# Patient Record
Sex: FEMALE | Race: WHITE | NOT HISPANIC OR LATINO | Employment: PART TIME | URBAN - METROPOLITAN AREA
[De-identification: names, ages, dates, MRNs, and addresses within clinical notes are randomized per-mention and may not be internally consistent; named-entity substitution may affect disease eponyms.]

---

## 2017-08-02 ENCOUNTER — TRANSFERRED RECORDS (OUTPATIENT)
Dept: HEALTH INFORMATION MANAGEMENT | Facility: CLINIC | Age: 52
End: 2017-08-02

## 2017-09-05 ENCOUNTER — TRANSFERRED RECORDS (OUTPATIENT)
Dept: HEALTH INFORMATION MANAGEMENT | Facility: CLINIC | Age: 52
End: 2017-09-05

## 2017-09-26 ENCOUNTER — TRANSFERRED RECORDS (OUTPATIENT)
Dept: HEALTH INFORMATION MANAGEMENT | Facility: CLINIC | Age: 52
End: 2017-09-26

## 2018-11-13 ENCOUNTER — TRANSFERRED RECORDS (OUTPATIENT)
Dept: HEALTH INFORMATION MANAGEMENT | Facility: CLINIC | Age: 53
End: 2018-11-13

## 2018-12-31 LAB — MAMMOGRAM: NORMAL

## 2019-03-17 ENCOUNTER — TRANSFERRED RECORDS (OUTPATIENT)
Dept: HEALTH INFORMATION MANAGEMENT | Facility: CLINIC | Age: 54
End: 2019-03-17

## 2019-04-15 ENCOUNTER — TRANSFERRED RECORDS (OUTPATIENT)
Dept: HEALTH INFORMATION MANAGEMENT | Facility: CLINIC | Age: 54
End: 2019-04-15

## 2019-04-16 ENCOUNTER — TRANSFERRED RECORDS (OUTPATIENT)
Dept: HEALTH INFORMATION MANAGEMENT | Facility: CLINIC | Age: 54
End: 2019-04-16

## 2019-05-29 ENCOUNTER — TRANSFERRED RECORDS (OUTPATIENT)
Dept: HEALTH INFORMATION MANAGEMENT | Facility: CLINIC | Age: 54
End: 2019-05-29

## 2019-07-10 ENCOUNTER — MEDICAL CORRESPONDENCE (OUTPATIENT)
Dept: HEALTH INFORMATION MANAGEMENT | Facility: CLINIC | Age: 54
End: 2019-07-10

## 2019-07-11 ENCOUNTER — DOCUMENTATION ONLY (OUTPATIENT)
Dept: CARE COORDINATION | Facility: CLINIC | Age: 54
End: 2019-07-11

## 2019-07-11 NOTE — TELEPHONE ENCOUNTER
FUTURE VISIT INFORMATION      FUTURE VISIT INFORMATION:    Date: 7/18/19    Time: 11AM    Location: [facility] [place]  REFERRAL INFORMATION:    Referring provider:      Referring providers clinic:  [facility]     Reason for visit/diagnosis  NEERU    RECORDS REQUESTED FROM:       Clinic name Comments Records Status Imaging Status   [facility]   03/07/2019 notes with JULISSA Hopkins MD      04/16/2019 BRONCHOSCOPY WITH BRONCHIAL ALVEOLAR LAVAGE with JULISSA Hopkins MD   Care Everywhere    [facility]  ENT 03/06/2018 notes with Spencer Clifford MD   Care Everywhere    [facility]   09/05/2017 & 09/28/2017 SLEEP STUDY  Care everywhere                        7/11/19 10:25AM sent a fax to [facility] for sleep study - [facility]   7/15/19 11:03AM received sleep study graphs from [facility], dropping off in clinic drawer - amay

## 2019-07-18 ENCOUNTER — PRE VISIT (OUTPATIENT)
Dept: OTOLARYNGOLOGY | Facility: CLINIC | Age: 54
End: 2019-07-18

## 2019-07-18 ENCOUNTER — OFFICE VISIT (OUTPATIENT)
Dept: OTOLARYNGOLOGY | Facility: CLINIC | Age: 54
End: 2019-07-18
Payer: COMMERCIAL

## 2019-07-18 VITALS — WEIGHT: 203 LBS | BODY MASS INDEX: 32.62 KG/M2 | HEIGHT: 66 IN

## 2019-07-18 DIAGNOSIS — G47.33 OSA (OBSTRUCTIVE SLEEP APNEA): Primary | ICD-10-CM

## 2019-07-18 RX ORDER — MONTELUKAST SODIUM 10 MG/1
10 TABLET ORAL
COMMUNITY
Start: 2019-05-29 | End: 2019-11-11

## 2019-07-18 RX ORDER — FLUTICASONE PROPIONATE 50 MCG
1 SPRAY, SUSPENSION (ML) NASAL
COMMUNITY
Start: 2018-09-06 | End: 2024-07-11

## 2019-07-18 RX ORDER — ALBUTEROL SULFATE 90 UG/1
2 AEROSOL, METERED RESPIRATORY (INHALATION)
COMMUNITY
Start: 2019-05-29

## 2019-07-18 RX ORDER — ESTRADIOL 0.1 MG/G
1 CREAM VAGINAL
COMMUNITY
Start: 2019-03-13

## 2019-07-18 RX ORDER — PRAVASTATIN SODIUM 40 MG
TABLET ORAL
Refills: 1 | COMMUNITY
Start: 2019-06-25 | End: 2021-10-29

## 2019-07-18 RX ORDER — VENLAFAXINE HYDROCHLORIDE 75 MG/1
CAPSULE, EXTENDED RELEASE ORAL
Refills: 0 | COMMUNITY
Start: 2019-06-11 | End: 2019-11-11

## 2019-07-18 RX ORDER — TRIAMCINOLONE ACETONIDE 1 MG/G
CREAM TOPICAL
COMMUNITY
Start: 2019-06-04 | End: 2024-07-11

## 2019-07-18 ASSESSMENT — MIFFLIN-ST. JEOR: SCORE: 1527.55

## 2019-07-18 NOTE — PATIENT INSTRUCTIONS
1.  You were seen in the ENT Clinic today by Dr. Hampton.  If you have any questions or concerns after your appointment, please call [phone].  Press option #1 for scheduling related needs.    2.  The plan is to move forward with a palate procedure (UPPP) and bilateral tonsillectomy for your sleep apnea.  3. This procedure requires a prior authorization from your insurance company. We will start this process today.    4. Surgery will be scheduled after is has been approved.  Surgery scheduler is Sania, she is reachable at [phone].  5. You will need to obtain a pre op physical by your primary care physician. This is only good for 30 days so please wait until you have a surgery date.    6. If you did not receive pre op teaching or an informational packet today, this will be mailed to you. Teaching will be done over the phone.    Please call our clinic for any questions, concerns, and/or worsening symptoms.      Clinic #[phone]       Option 1 for scheduling.    Thank you for allowing us to be apart of your care!    Danna AYERS RNCC    If you need to reach me my direct line is: [phone]                    Patient Education     Uvulopalatopharyngoplasty (UPPP)  Your healthcare provider has suggested uvulopalatopharyngoplasty (UPPP) to treat your snoring or sleep apnea (a condition that affects nighttime breathing). During UPPP, the tonsils and soft tissue in the back of the throat are removed. This helps prevent blockage of the airway during sleep. In many cases, UPPP can permanently improve sleep apnea and decrease snoring. Even so, you may need to continue other treatments such as CPAP (continuous positive air pressure) at first.    Preparing for surgery  Prepare for the surgery as you have been instructed. Be sure to tell your healthcare provider about all medicines you take. This includes over-the-counter drugs. It also includes herbs and other supplements. You may need to stop taking some or all of  them before surgery as directed by your healthcare provider. Also, follow any directions you re given for not eating or drinking before surgery.  The day of surgery  The surgery takes about 60 minutes. If you are having UPPP combined with nasal surgery, your experience will be slightly different than what is described here. In that case, your healthcare provider can tell you what to expect.  Before the surgery   Here's what to expect before the surgery begins:    An IV line is put into a vein in your arm or hand. This line delivers fluids and medicines.    You will be given medicine (anesthesia) to keep you free of pain during the surgery. This will likely be general anesthesia, which puts you into a state like deep sleep during the surgery.  During the surgery  Here's what to expect at the time of surgery:     A special device holds your mouth open. Pillows may be placed under your shoulders and on either side of your neck to support your head.    If you still have tonsils, these will likely be removed.    The soft tissue at the back of your mouth (soft palate) is trimmed. The small piece of flesh that hangs down from the soft palate (uvula) is removed.    The edges of the remaining tissue are closed with sutures (stitches). The sutures dissolve on their own in a few weeks.    You may have an injection of local anesthesia. This helps prevent pain after surgery.  Recovering in the hospital  After the surgery, you will be taken to a room to wake up from the anesthesia. At first, your throat will feel very sore. It will be hard to talk and swallow. You may also feel sleepy and nauseated. You will receive pain medicine. If you still feel pain, tell the healthcare provider or nurse. If you have sleep apnea, you will likely stay overnight in the hospital so your breathing can be closely watched. Once you are ready to go home, you will be released to an adult family member or friend.  Recovering at home  Once at home,  follow the instructions you have been given. You will have throat pain as you recover. This may come and go. It s normal for pain to increase few days after surgery, before it starts to improve. It may take around 3 weeks for the pain to go away completely. Eating and drinking will likely be uncomfortable for about 5 days. During your recovery:    Take all medicines as directed, including pain medicine.    Do not drive while you are on opioid or narcotic pain medicine. Expect to feel sleepy or dizzy while you are taking this medicine.    Drink lots of cold liquids. Water, noncitrus juices, and frozen juice bars are good choices.    Stick to cold foods and soft foods, which are easiest to swallow. Try ice cream, gelatin, eggs, pasta, and mashed potatoes. Avoid hot, spicy, acidic, hard, or crunchy foods.      Don't cough or clear your throat for 2 weeks.    Don't use ibuprofen or aspirin for 14 days after surgery, unless your healthcare provider says it s OK. You may take acetaminophen as directed for pain.    Limit exercise as directed. Your healthcare provider will tell you when you can return to your normal activities and routine.    Follow instructions for when to start using continuous positive air pressure (CPAP) if it is prescribed.  When to call your healthcare provider  Be sure you have a contact number for your healthcare provider. After you get home, call if you have any of the following:    Chest pain or trouble breathing (call 911)    Fever of 100.4 F (38 C) or higher, or as directed by your healthcare provider    Bright red bleeding from the mouth or nose    Severe pain not relieved by medicine    Signs of dehydration (dark urine, urinating less often)    Heavy or persistent bleeding in the throat at any time    Inability to eat or drink at all for 2 to 3 days    Other signs or symptoms as indicated by your healthcare provider      Follow-up  During follow-up visits, your healthcare provider will check  on your healing. A sleep study may be done a few months after surgery. This helps show whether your sleep apnea has improved. If you are still not sleeping normally, other treatments may be needed.  Risks and possible complications  Risks of UPPP include:    Bleeding, which may happen a week or more after the surgery (usually needs treatment)    Severe throat pain during the healing period    Changes in the sound of your voice    The feeling that something is stuck in your throat (may last 6 to 12 months)      Liquids going into the nose when swallowing    Failure to cure sleep apnea    Risks of anesthesia, including apnea. You will discuss these risks with the anesthesiologist.   Date Last Reviewed: 5/1/2017 2000-2018 [employer]. [address] [place] [address], [place] [address]. All rights reserved. This information is not intended as a substitute for professional medical care. Always follow your healthcare professional's instructions.           Patient Education     Adult Tonsillectomy    The tonsils are 2 small masses of tissue at the back of the throat. They are part of the body s immune system. This helps the body fight disease. In some people, the tonsils become infected or enlarged. This can cause severe sore throats, snoring, or other problems. Tonsillectomy is surgery to remove the tonsils. Tonsillectomy may be recommended if you have obstruction causing sleep apnea, or recurring, chronic, or severe infections.   Preparing for surgery  Prepare as you have been told. Tell your healthcare provider about all medicine you take. This includes over-the-counter drugs. It also includes herbs and other supplements. You may need to stop taking some or all of them before surgery as directed by your healthcare provider. Also, follow any directions you re given for not eating or drinking before surgery.  The day of surgery  The surgery takes about 60 minutes. You will likely go home on the same day.  Before  the surgery  Here is what to expect before the surgery begins:    An IV line is put into a vein in your arm or hand. This line supplies fluids and medicines.    To keep you free of pain during the surgery, you re given general anesthesia. This medicine puts you into a state like deep sleep through the surgery.  During the surgery  Here is what to expect during the surgery:    A tube will be placed in your throat to keep your airway open.    A special device is used to keep the mouth open.    Other tools are used to remove the tonsils or part of the tonsils  from the back of the throat. The tissue is taken out through the mouth.    The device holding the mouth open and the tube are then removed.  After the surgery  You will be taken to a recovery room. Healthcare staff will make sure you can drink some liquids. They will also make sure your pain is being managed. When you are ready to leave the hospital, you will need to be driven home by an adult family member or friend.  Recovering at home  It will likely take about 2 weeks to heal from the surgery. During your recovery:    Expect to have throat pain. You may also feel pain in your ears. This is  referred  pain from the throat, and is normal. Your post-surgery pain may come and go. It may be worse on the 1st or 2nd day after surgery.    Talk as little as possible, if it is painful.    Take pain medicine as directed.    Don't drive while you are on opioid or narcotic pain medicine. Expect to feel sleepy or dizzy while you are taking this medicine.    Don't use ibuprofen or aspirin for 14 days after surgery unless your healthcare provider says it s OK. You may use acetaminophen as directed.    Use 2 or 3 pillows under your head while resting. This will help keep swelling down.    Drink plenty of chilled liquids. Water, noncitrus juices, and frozen juice bars are good choices.    Eat cold foods and soft foods, which are easiest to swallow. Try foods like ice cream,  gelatin, scrambled eggs, pasta, and mashed potatoes.    Don't eat foods that need a lot of chewing. Also avoid foods that may scratch the throat, like toast or potato chips. Don't have hot, spicy, or acidic foods.    Don't do strenuous activity or heavy lifting for 2 weeks after surgery.    Be aware that white patches will form in the throat during healing. These are scabs and are not a sign of infection. The patches will come off in  6 to 9 days and may cause a small amount of  bleeding. To minimize bleeding, drink plenty of fluids. Gargling with cold water can help.  When to call your healthcare provider  Call your healthcare provider right away if you have any of the following:    Chest pain or trouble breathing (call 911)    Fever of 100.4 F (38 C) or higher, or as directed by your healthcare provider    Bright red bleeding from the mouth or nose    Severe pain not relieved by medicine    Signs of dehydration (dark urine, urinating less often)    Heavy or persistent bleeding in the throat at any time    Other signs or symptoms as indicated by your healthcare provider   Follow-up  Schedule a follow-up visit with your healthcare provider as advised. During this visit, the healthcare provider will make sure you are healing well. Ask any questions you have about the surgery or your recovery.  Risks and possible complications    Infection    Bleeding    Lung problems    Nausea and vomiting    Injury to the lips, teeth, or jaw    Painful swallowing during recovery    Voice changes    The need for a second surgery    Risks of anesthesia    Date Last Reviewed: 6/1/2017 2000-2018 [employer]. [address] [place] [address]. All rights reserved. This information is not intended as a substitute for professional medical care. Always follow your healthcare professional's instructions.           Patient Education     Tonsillectomy, Post-Op Bleeding  You have had surgery to remove your tonsils.  Bleeding at the surgery site can happen after surgery. The healthcare provider can often control it using direct pressure or applying medicine to shrink the blood vessels. If this fails, you will need to return to the operating room for further treatment. Notify your healthcare provider at once or return to this hospital if there are signs of any further bleeding.  Home Care    Your throat will be sore. Throat pain after surgery improves over the first 3-5 days. It is normal to have more pain at the end of the first week before it finally goes away.    Soft foods will be easier to swallow.    Drink plenty of fluids to prevent dehydration. You must continue to drink even though your throat hurts.    For pain relief, suck on ice cubes or frozen fruit pops, eat ice cream or sherbet, and drink cold liquids. You may also use liquid acetaminophen. Don't take ibuprofen or aspirin. These may increase bleeding risk. If your healthcare provider has prescribed pain medicine, take this as directed.     If antibiotics were prescribed, take these as directed until they are gone.    Do not overheat your home since this dries the air and may irritate throat tissues, especially at night. A cool-mist humidifier in the bedroom may help.    Don t have contact with people with colds or the flu during the recovery period. You may be more likely to get ill during this time.    Smoking irritates the surgery site. If you smoke, this is a good time to stop.    Don't do heavy exercise, lifting, or straining for the next 10 days.  Follow-up care  Follow up with your healthcare provider or this facility as advised.  When to see medical advice  Call your healthcare provider right away if any of the following occur:    Trouble speaking, swallowing, or breathing    Nausea and vomiting    Bleeding from the mouth    Fever over 100.4 F (38.3 C)    Increasing pain not controlled by pain medicines    Signs of dehydration: Very dark urine, less urine,  dry mouth, weakness  Date Last Reviewed: 10/1/2017    3176-1797 [employer]. [address] [place] [address], [place] [address]. All rights reserved. This information is not intended as a substitute for professional medical care. Always follow your healthcare professional's instructions.

## 2019-07-18 NOTE — LETTER
2019       RE: Tessie Mcdaniels  [address] [facility] [address]     Dear Colleague,    Thank you for referring your patient, Tessie Mcdaniels, to the [facility] at [facility]. Please see a copy of my visit note below.        SLEEP SURGERY CONSULTATION    Patient: Tessie Mcdaniels  : 1965  CHIEF COMPLAINT: NEERU     IDENTIFICATION: Dr. Peñaloza consulted Dr. Hampton for surgical evaluation and possible treatment of obstructive sleep apnea syndrome for Tessie Mcdaniels.    HPI:  Tessie Mcdaniels is a 54 year old year old male who has Obstructive Sleep Apnea.  Patient had a sleep study performed on 2017.  This was a split-night sleep study.  Overall AHI was 69.7 with a lowest oxygen saturation of 85%.  Patient has been on CPAP since that time.  She really struggles with CPAP because of her underlying issues with anxiety she reports.  When she puts the CPAP on her anxiety becomes quite exacerbated.  She is unable to fall asleep with the mask on.  She tried an oral appliance many years ago but could not remember if it helped or not.  She denies any issues with bruxism or jaw pain.  She does have some issues breathing through her nose.  She is been on Flonase for the past 6 months without significant improvement.  She believes she has had a turbinate reduction procedure in the past.  Sound like it was a cauterization style.  This was done over 10 years ago she reports.  She feels like her nasal congestion has returned.      PAST MEDICAL HISTORY:  No past medical history on file.    PAST SURGICAL HISTORY:  No past surgical history on file.    MEDICATIONS:  Current Outpatient Medications   Medication Sig Dispense Refill     albuterol (PROVENTIL HFA) 108 (90 Base) MCG/ACT inhaler Inhale 2 puffs into the lungs       ALPRAZolam (XANAX PO) Take 0.5 mg by mouth as needed for anxiety       estradiol (ESTRACE) 0.1 MG/GM vaginal cream Place 1 g vaginally        FLUoxetine HCl (PROZAC PO) Take 60 mg by mouth daily       fluticasone (FLONASE) 50 MCG/ACT nasal spray Spray 1 spray in nostril       LamoTRIgine (LAMICTAL PO) Take 75 mg by mouth daily       LOVASTATIN PO Take by mouth daily       montelukast (SINGULAIR) 10 MG tablet Take 10 mg by mouth       pravastatin (PRAVACHOL) 40 MG tablet   1     QUEtiapine Fumarate (SEROQUEL PO) Take 200 mg by mouth At Bedtime       TRAZODONE HCL PO Take 100 mg by mouth daily       triamcinolone (KENALOG) 0.1 % external cream        venlafaxine (EFFEXOR-XR) 75 MG 24 hr capsule   0     Ziprasidone HCl (GEODON PO) Take 80 mg by mouth At Bedtime          ALLERGIES:  Allergies   Allergen Reactions     Diflucan [Fluconazole]      Mouth sores     Sulfa Drugs Nausea and Vomiting       SOCIAL HISTORY:  Social History     Socioeconomic History     Marital status:      Spouse name: Not on file     Number of children: Not on file     Years of education: Not on file     Highest education level: Not on file   Occupational History     Not on file   Social Needs     Financial resource strain: Not on file     Food insecurity:     Worry: Not on file     Inability: Not on file     Transportation needs:     Medical: Not on file     Non-medical: Not on file   Tobacco Use     Smoking status: Not on file   Substance and Sexual Activity     Alcohol use: Not on file     Drug use: Not on file     Sexual activity: Not on file   Lifestyle     Physical activity:     Days per week: Not on file     Minutes per session: Not on file     Stress: Not on file   Relationships     Social connections:     Talks on phone: Not on file     Gets together: Not on file     Attends [employer] service: Not on file     Active member of club or organization: Not on file     Attends meetings of clubs or organizations: Not on file     Relationship status: Not on file     Intimate partner violence:     Fear of current or ex partner: Not on file     Emotionally abused: Not on file     " Physically abused: Not on file     Forced sexual activity: Not on file   Other Topics Concern     Not on file   Social History Narrative     Not on file       FAMILY HISTORY:  No family history on file.    REVIEW OF SYSTEMS:  [facility] ROS 7/18/2019   Constitutional Weight gain, Problems with sleep   Psychology Frequently feeling depressed or sad, Frequently feeling anxious   Ears, Nose, Throat Nasal congestion or drainage   Cardiopulmonary Breathing problems   Gastrointestinal/Genitourinary Heartburn/indigestion   Musculoskeletal Sore or stiff joints, Back pain   Allergy/Immunology Rash   Endocrine Thirst   Other Rash         PHYSICAL EXAM  Ht 1.676 m (5' 6\")   Wt 92.1 kg (203 lb)   BMI 32.77 kg/m       Constitutional: healthy, alert and no distress  ENT:   NOSE:  Septum is midline.  Turbinates are enlarged  MOUTH:   MMM 4, small mouth, tonsils difficult to see.  Narrow oropharynx.    ASSESSMENT:  1.  Severe obstructive sleep apnea,  incompletely treated     Incompletely treated sleep apnea elevates risk of death, cardiovascular disease, and motor vehicle crashes, and it creates deficits in daytime function and quality of life.  Surgical treatment can improve these clinically important outcomes; therefore surgical treatment is medically necessary if the patient is not using CPAP adequately.         PLAN:  1.  We discussed obstructive sleep apnea, including pathophysiology and consequences.  We provided the patient with written information about obstructive sleep apnea and its management.  We discussed the beneficial relationship between weight loss and sleep apnea.      2.    We discussed the goals of oropharyngeal surgery are to improve oropharyngeal airway at the level of the velopharynx.  Patient understands that isolated palate surgery alone may not always decrease the severity obstructive sleep apnea.  We advocate a multilevel approach in order to decrease the severity of obstructive sleep apnea.  We aim to " improve the nighttime oropharyngeal airway, improve daytime symptoms of obstructive sleep apnea, and potentially facilitate the effectiveness of CPAP therapy by decreasing pressure requirements.  We discussed the expected course of one to two night stay in the hospital, two to three weeks of throat pain, and two to three weeks of pureed/soft diet.  Patient may have temporary dysphagia, voice change, velopharyngeal insufficiency, and tongue numbness.    Patient was taught what is included and excluded in a soft diet.  Patient advised to take one to two weeks off from work.  Post-surgical medications will include a narcotic for pain control, prednisone for post-operative edema.      We discussed risks, including but not limited to post-operative bleeding (immediate and delayed), nasopharyngeal stenosis, residual obstruction, velopharyngeal insufficiency, dry throat sensation, infection, and others.      I also talked to the patient about possibly using an oral appliance in combination with surgery.  She is open to this.  She does have a small mouth so that he might be a little difficult but I certainly think it might be worthwhile.  She is not a candidate for inspired due to the severity of her sleep apnea.    I spent 35 minutes face-to-face with Tessie Mcdaniels during today's office visit, of which more than 50% was spent on counseling and coordination of care, which included discussion of pathophysiology of patient's obstructive sleep apnea, treatment options, risks and benefits of each option.                        Again, thank you for allowing me to participate in the care of your patient.      Sincerely,    Evita Hampton MD

## 2019-07-18 NOTE — PROGRESS NOTES
SLEEP SURGERY CONSULTATION    Patient: Tessie Mcdaniels  : 1965  CHIEF COMPLAINT: NEERU     IDENTIFICATION: Dr. Peñaloza consulted Dr. Hampton for surgical evaluation and possible treatment of obstructive sleep apnea syndrome for Tessie Mcdaniels.    HPI:  Tessie Mcdaniels is a 54 year old year old male who has Obstructive Sleep Apnea.  Patient had a sleep study performed on 2017.  This was a split-night sleep study.  Overall AHI was 69.7 with a lowest oxygen saturation of 85%.  Patient has been on CPAP since that time.  She really struggles with CPAP because of her underlying issues with anxiety she reports.  When she puts the CPAP on her anxiety becomes quite exacerbated.  She is unable to fall asleep with the mask on.  She tried an oral appliance many years ago but could not remember if it helped or not.  She denies any issues with bruxism or jaw pain.  She does have some issues breathing through her nose.  She is been on Flonase for the past 6 months without significant improvement.  She believes she has had a turbinate reduction procedure in the past.  Sound like it was a cauterization style.  This was done over 10 years ago she reports.  She feels like her nasal congestion has returned.      PAST MEDICAL HISTORY:  No past medical history on file.    PAST SURGICAL HISTORY:  No past surgical history on file.    MEDICATIONS:  Current Outpatient Medications   Medication Sig Dispense Refill     albuterol (PROVENTIL HFA) 108 (90 Base) MCG/ACT inhaler Inhale 2 puffs into the lungs       ALPRAZolam (XANAX PO) Take 0.5 mg by mouth as needed for anxiety       estradiol (ESTRACE) 0.1 MG/GM vaginal cream Place 1 g vaginally       FLUoxetine HCl (PROZAC PO) Take 60 mg by mouth daily       fluticasone (FLONASE) 50 MCG/ACT nasal spray Spray 1 spray in nostril       LamoTRIgine (LAMICTAL PO) Take 75 mg by mouth daily       LOVASTATIN PO Take by mouth daily       montelukast (SINGULAIR) 10 MG tablet Take 10 mg by  mouth       pravastatin (PRAVACHOL) 40 MG tablet   1     QUEtiapine Fumarate (SEROQUEL PO) Take 200 mg by mouth At Bedtime       TRAZODONE HCL PO Take 100 mg by mouth daily       triamcinolone (KENALOG) 0.1 % external cream        venlafaxine (EFFEXOR-XR) 75 MG 24 hr capsule   0     Ziprasidone HCl (GEODON PO) Take 80 mg by mouth At Bedtime          ALLERGIES:  Allergies   Allergen Reactions     Diflucan [Fluconazole]      Mouth sores     Sulfa Drugs Nausea and Vomiting       SOCIAL HISTORY:  Social History     Socioeconomic History     Marital status:      Spouse name: Not on file     Number of children: Not on file     Years of education: Not on file     Highest education level: Not on file   Occupational History     Not on file   Social Needs     Financial resource strain: Not on file     Food insecurity:     Worry: Not on file     Inability: Not on file     Transportation needs:     Medical: Not on file     Non-medical: Not on file   Tobacco Use     Smoking status: Not on file   Substance and Sexual Activity     Alcohol use: Not on file     Drug use: Not on file     Sexual activity: Not on file   Lifestyle     Physical activity:     Days per week: Not on file     Minutes per session: Not on file     Stress: Not on file   Relationships     Social connections:     Talks on phone: Not on file     Gets together: Not on file     Attends Buddhist service: Not on file     Active member of club or organization: Not on file     Attends meetings of clubs or organizations: Not on file     Relationship status: Not on file     Intimate partner violence:     Fear of current or ex partner: Not on file     Emotionally abused: Not on file     Physically abused: Not on file     Forced sexual activity: Not on file   Other Topics Concern     Not on file   Social History Narrative     Not on file       FAMILY HISTORY:  No family history on file.    REVIEW OF SYSTEMS:  [facility] 7/18/2019   Constitutional Weight gain,  "Problems with sleep   Psychology Frequently feeling depressed or sad, Frequently feeling anxious   Ears, Nose, Throat Nasal congestion or drainage   Cardiopulmonary Breathing problems   Gastrointestinal/Genitourinary Heartburn/indigestion   Musculoskeletal Sore or stiff joints, Back pain   Allergy/Immunology Rash   Endocrine Thirst   Other Rash         PHYSICAL EXAM  Ht 1.676 m (5' 6\")   Wt 92.1 kg (203 lb)   BMI 32.77 kg/m      Constitutional: healthy, alert and no distress  ENT:   NOSE:  Septum is midline.  Turbinates are enlarged  MOUTH:   MMM 4, small mouth, tonsils difficult to see.  Narrow oropharynx.    ASSESSMENT:  1.  Severe obstructive sleep apnea,  incompletely treated     Incompletely treated sleep apnea elevates risk of death, cardiovascular disease, and motor vehicle crashes, and it creates deficits in daytime function and quality of life.  Surgical treatment can improve these clinically important outcomes; therefore surgical treatment is medically necessary if the patient is not using CPAP adequately.         PLAN:  1.  We discussed obstructive sleep apnea, including pathophysiology and consequences.  We provided the patient with written information about obstructive sleep apnea and its management.  We discussed the beneficial relationship between weight loss and sleep apnea.      2.    We discussed the goals of oropharyngeal surgery are to improve oropharyngeal airway at the level of the velopharynx.  Patient understands that isolated palate surgery alone may not always decrease the severity obstructive sleep apnea.  We advocate a multilevel approach in order to decrease the severity of obstructive sleep apnea.  We aim to improve the nighttime oropharyngeal airway, improve daytime symptoms of obstructive sleep apnea, and potentially facilitate the effectiveness of CPAP therapy by decreasing pressure requirements.  We discussed the expected course of one to two night stay in the hospital, two to " three weeks of throat pain, and two to three weeks of pureed/soft diet.  Patient may have temporary dysphagia, voice change, velopharyngeal insufficiency, and tongue numbness.    Patient was taught what is included and excluded in a soft diet.  Patient advised to take one to two weeks off from work.  Post-surgical medications will include a narcotic for pain control, prednisone for post-operative edema.      We discussed risks, including but not limited to post-operative bleeding (immediate and delayed), nasopharyngeal stenosis, residual obstruction, velopharyngeal insufficiency, dry throat sensation, infection, and others.      I also talked to the patient about possibly using an oral appliance in combination with surgery.  She is open to this.  She does have a small mouth so that he might be a little difficult but I certainly think it might be worthwhile.  She is not a candidate for inspired due to the severity of her sleep apnea.    I spent 35 minutes face-to-face with Tessie Mcdaniels during today's office visit, of which more than 50% was spent on counseling and coordination of care, which included discussion of pathophysiology of patient's obstructive sleep apnea, treatment options, risks and benefits of each option.

## 2019-08-21 ENCOUNTER — TELEPHONE (OUTPATIENT)
Dept: OTOLARYNGOLOGY | Facility: CLINIC | Age: 54
End: 2019-08-21

## 2019-08-21 NOTE — TELEPHONE ENCOUNTER
Left msg for patient to call Sania in surgery scheduling for Dr Hampton at [facility].    Sania Reyes   ENT Jen-Op Coordinator  [phone]  [email or web address]

## 2019-08-22 ENCOUNTER — DOCUMENTATION ONLY (OUTPATIENT)
Dept: OTOLARYNGOLOGY | Facility: CLINIC | Age: 54
End: 2019-08-22

## 2019-08-22 NOTE — PROGRESS NOTES
Patient is scheduled for surgery with Dr. Hampton    Procedure:  Uvulopalatopharyngoplasty and bilateral tonsillectomy    Spoke or left message with: patient by phone    Date of Surgery: [id number]  (pt choice)    Location: [facility]    H&P: Scheduled with Dr Ramos  [facility]    Additional imaging/appointments: [place] 12[place]3/19  [place]30 [place]  [facility] [place]: mailed 8/22/19    Additional comments: per Daysi/ Mirella at [facility] - no PA is required - coverage based on medical necessity.  Ok to schedule      Sania Reyes   ENT Jen-Op Coordinator  [phone]  [email or web address]

## 2019-10-14 ENCOUNTER — TELEPHONE (OUTPATIENT)
Dept: OTOLARYNGOLOGY | Facility: CLINIC | Age: 54
End: 2019-10-14

## 2019-10-14 NOTE — TELEPHONE ENCOUNTER
Left message for patient with surgery teaching instructions. Left call back information for patient if she has futher questions or concerns.

## 2019-11-11 RX ORDER — MELOXICAM 7.5 MG/1
7.5 TABLET ORAL DAILY
Status: ON HOLD | COMMUNITY
End: 2021-09-14

## 2019-11-11 SDOH — HEALTH STABILITY: MENTAL HEALTH: HOW OFTEN DO YOU HAVE A DRINK CONTAINING ALCOHOL?: NEVER

## 2019-11-12 ENCOUNTER — ANESTHESIA EVENT (OUTPATIENT)
Dept: SURGERY | Facility: CLINIC | Age: 54
DRG: 133 | End: 2019-11-12
Payer: MEDICARE

## 2019-11-12 ENCOUNTER — PATIENT OUTREACH (OUTPATIENT)
Dept: OTOLARYNGOLOGY | Facility: CLINIC | Age: 54
End: 2019-11-12

## 2019-11-12 NOTE — ANESTHESIA PREPROCEDURE EVALUATION
Anesthesia Pre-Procedure Evaluation    Patient: Tessie Mcdaniels   MRN:     [id number] Gender:   female   Age:    54 year old :      1965        Preoperative Diagnosis: Obstructive Sleep Apnea   Procedure(s):  Uvulopalatopharyngoplasty  Bilateral Tonsillectomy     Past Medical History:   Diagnosis Date     Sleep apnea       History reviewed. No pertinent surgical history.       Anesthesia Evaluation     . Pt has had prior anesthetic. Type: General           ROS/MED HX    ENT/Pulmonary: Comment: Does not tolerate CPAP very well:  Neither the fitting nor the inconvenience of traveling with the device.    (+)sleep apnea, doesn't use CPAP , . .    Neurologic:       Cardiovascular:         METS/Exercise Tolerance:  >4 METS   Hematologic:         Musculoskeletal:         GI/Hepatic:         Renal/Genitourinary:         Endo:     (+) Obesity, .      Psychiatric: Comment: Patient seems emotionally/psychologically stable and even-tempered.  She questions the diagnosis of bipolar.    (+) psychiatric history depression, bipolar and anxiety      Infectious Disease:         Malignancy:         Other:                         PHYSICAL EXAM:   Mental Status/Neuro: A/A/O; Age Appropriate   Airway: Facies: Feasible  Mallampati: II  Mouth/Opening: Full  TM distance: < 6 cm  Neck ROM: Full   Respiratory: Auscultation: CTAB     Resp. Rate: Normal     Resp. Effort: Normal      CV: Rhythm: Regular  Edema: None   Comments:      Dental: Normal Dentition                LABS:  CBC: No results found for: WBC, HGB, HCT, PLT  BMP: No results found for: NA, POTASSIUM, CHLORIDE, CO2, BUN, CR, GLC  COAGS: No results found for: PTT, INR, FIBR  POC: No results found for: BGM, HCG, HCGS  OTHER: No results found for: PH, LACT, A1C, SAIDA, PHOS, MAG, ALBUMIN, PROTTOTAL, ALT, AST, GGT, ALKPHOS, BILITOTAL, BILIDIRECT, LIPASE, AMYLASE, LAUREN, TSH, T4, T3, CRP, SED     Preop Vitals    BP Readings from Last 3 Encounters:   09/10/14 123/74   14 153/86  "  09/05/14 140/90    Pulse Readings from Last 3 Encounters:   09/10/14 71   09/08/14 86   09/05/14 75      Resp Readings from Last 3 Encounters:   09/10/14 16   09/08/14 18   09/05/14 16    SpO2 Readings from Last 3 Encounters:   09/10/14 95%   09/08/14 97%   09/05/14 95%      Temp Readings from Last 1 Encounters:   09/10/14 37.1  C (98.8  F) (Tympanic)    Ht Readings from Last 1 Encounters:   07/18/19 1.676 m (5' 6\")      Wt Readings from Last 1 Encounters:   07/18/19 92.1 kg (203 lb)    Estimated body mass index is 32.77 kg/m  as calculated from the following:    Height as of 7/18/19: 1.676 m (5' 6\").    Weight as of 7/18/19: 92.1 kg (203 lb).     LDA:        Assessment:   ASA SCORE: 2    H&P: History and physical reviewed and following examination; no interval change.   Smoking Status:  Non-Smoker/Unknown        Plan:   Anes. Type:  General   Pre-Medication: Midazolam   Induction:  IV (Standard)   Airway: ETT; Oral   Access/Monitoring: PIV   Maintenance: Balanced     Postop Plan:   Postop Pain: Opioids  Postop Sedation/Airway: Not planned  Disposition: Outpatient     PONV Management:   Adult Risk Factors: Female, Non-Smoker, Postop Opioids   Prevention: Ondansetron, Propofol     CONSENT: Direct conversation   Plan and risks discussed with: Patient   Blood Products: Consent Deferred (Minimal Blood Loss)                   Dennise Padilla MD  "

## 2019-11-12 NOTE — PROGRESS NOTES
Patient calling to report a stuffy nose and intermittent sneezing. Patient confirms the absence of fever/chills. She denies drainage from the nose, denies sore throat. Reports that she is prescribed Flonase to be used daily and she ran out. Patient is concerned that surgery may be cancelled. RN informed patient that Dr. Hampton will be in clinic this afternoon. RN will discuss this with Dr. Hampton however, RN informed patient that it is unlikely her surgery will be cancelled considering she is afebrile. RN instructed patient to call if fever develops. RN will update patient after Dr. Hampton is advised. Patient is in agreement with this plan. Patient denies any further questions or concerns at this time.     Danna Grant RN

## 2019-11-13 ENCOUNTER — HOSPITAL ENCOUNTER (INPATIENT)
Facility: CLINIC | Age: 54
LOS: 1 days | Discharge: HOME | DRG: 133 | End: 2019-11-15
Attending: OTOLARYNGOLOGY | Admitting: OTOLARYNGOLOGY
Payer: MEDICARE

## 2019-11-13 ENCOUNTER — ANESTHESIA (OUTPATIENT)
Dept: SURGERY | Facility: CLINIC | Age: 54
DRG: 133 | End: 2019-11-13
Payer: MEDICARE

## 2019-11-13 DIAGNOSIS — G47.33 OSA (OBSTRUCTIVE SLEEP APNEA): Primary | ICD-10-CM

## 2019-11-13 DIAGNOSIS — G89.18 ACUTE POST-OPERATIVE PAIN: ICD-10-CM

## 2019-11-13 DIAGNOSIS — G89.18 ACUTE POST-OPERATIVE PAIN: Primary | ICD-10-CM

## 2019-11-13 LAB — GLUCOSE BLDC GLUCOMTR-MCNC: 105 MG/DL (ref 70–99)

## 2019-11-13 PROCEDURE — 25000128 H RX IP 250 OP 636: Performed by: STUDENT IN AN ORGANIZED HEALTH CARE EDUCATION/TRAINING PROGRAM

## 2019-11-13 PROCEDURE — 09SL7ZZ REPOSITION NASAL TURBINATE, VIA NATURAL OR ARTIFICIAL OPENING: ICD-10-PCS | Performed by: OTOLARYNGOLOGY

## 2019-11-13 PROCEDURE — 36000057 ZZH SURGERY LEVEL 3 1ST 30 MIN - UMMC: Performed by: OTOLARYNGOLOGY

## 2019-11-13 PROCEDURE — 25000128 H RX IP 250 OP 636: Performed by: OTOLARYNGOLOGY

## 2019-11-13 PROCEDURE — 25800025 ZZH RX 258: Performed by: OTOLARYNGOLOGY

## 2019-11-13 PROCEDURE — 71000015 ZZH RECOVERY PHASE 1 LEVEL 2 EA ADDTL HR: Performed by: OTOLARYNGOLOGY

## 2019-11-13 PROCEDURE — 0KS40ZZ REPOSITION TONGUE, PALATE, PHARYNX MUSCLE, OPEN APPROACH: ICD-10-PCS | Performed by: OTOLARYNGOLOGY

## 2019-11-13 PROCEDURE — 25000566 ZZH SEVOFLURANE, EA 15 MIN: Performed by: OTOLARYNGOLOGY

## 2019-11-13 PROCEDURE — 37000008 ZZH ANESTHESIA TECHNICAL FEE, 1ST 30 MIN: Performed by: OTOLARYNGOLOGY

## 2019-11-13 PROCEDURE — 36000059 ZZH SURGERY LEVEL 3 EA 15 ADDTL MIN UMMC: Performed by: OTOLARYNGOLOGY

## 2019-11-13 PROCEDURE — 96375 TX/PRO/DX INJ NEW DRUG ADDON: CPT

## 2019-11-13 PROCEDURE — 25000125 ZZHC RX 250: Performed by: NURSE ANESTHETIST, CERTIFIED REGISTERED

## 2019-11-13 PROCEDURE — 25800030 ZZH RX IP 258 OP 636: Performed by: STUDENT IN AN ORGANIZED HEALTH CARE EDUCATION/TRAINING PROGRAM

## 2019-11-13 PROCEDURE — 0CTPXZZ RESECTION OF TONSILS, EXTERNAL APPROACH: ICD-10-PCS | Performed by: OTOLARYNGOLOGY

## 2019-11-13 PROCEDURE — 40000170 ZZH STATISTIC PRE-PROCEDURE ASSESSMENT II: Performed by: OTOLARYNGOLOGY

## 2019-11-13 PROCEDURE — 40000141 ZZH STATISTIC PERIPHERAL IV START W/O US GUIDANCE

## 2019-11-13 PROCEDURE — 25000128 H RX IP 250 OP 636: Performed by: NURSE ANESTHETIST, CERTIFIED REGISTERED

## 2019-11-13 PROCEDURE — 96376 TX/PRO/DX INJ SAME DRUG ADON: CPT

## 2019-11-13 PROCEDURE — G0378 HOSPITAL OBSERVATION PER HR: HCPCS

## 2019-11-13 PROCEDURE — 96374 THER/PROPH/DIAG INJ IV PUSH: CPT

## 2019-11-13 PROCEDURE — 25000125 ZZHC RX 250: Performed by: OTOLARYNGOLOGY

## 2019-11-13 PROCEDURE — 25800030 ZZH RX IP 258 OP 636: Performed by: NURSE ANESTHETIST, CERTIFIED REGISTERED

## 2019-11-13 PROCEDURE — 88304 TISSUE EXAM BY PATHOLOGIST: CPT | Performed by: OTOLARYNGOLOGY

## 2019-11-13 PROCEDURE — 37000009 ZZH ANESTHESIA TECHNICAL FEE, EACH ADDTL 15 MIN: Performed by: OTOLARYNGOLOGY

## 2019-11-13 PROCEDURE — 00000146 ZZHCL STATISTIC GLUCOSE BY METER IP

## 2019-11-13 PROCEDURE — 095L7ZZ DESTRUCTION OF NASAL TURBINATE, VIA NATURAL OR ARTIFICIAL OPENING: ICD-10-PCS | Performed by: OTOLARYNGOLOGY

## 2019-11-13 PROCEDURE — 25000132 ZZH RX MED GY IP 250 OP 250 PS 637: Performed by: OTOLARYNGOLOGY

## 2019-11-13 PROCEDURE — 71000014 ZZH RECOVERY PHASE 1 LEVEL 2 FIRST HR: Performed by: OTOLARYNGOLOGY

## 2019-11-13 PROCEDURE — 27210794 ZZH OR GENERAL SUPPLY STERILE: Performed by: OTOLARYNGOLOGY

## 2019-11-13 PROCEDURE — 96361 HYDRATE IV INFUSION ADD-ON: CPT

## 2019-11-13 PROCEDURE — 25000128 H RX IP 250 OP 636: Performed by: ANESTHESIOLOGY

## 2019-11-13 RX ORDER — PREDNISONE 20 MG/1
20 TABLET ORAL DAILY
Qty: 5 TABLET | Refills: 0 | Status: ON HOLD | OUTPATIENT
Start: 2019-11-13 | End: 2021-09-14

## 2019-11-13 RX ORDER — DIMENHYDRINATE 50 MG/ML
25 INJECTION, SOLUTION INTRAMUSCULAR; INTRAVENOUS
Status: DISCONTINUED | OUTPATIENT
Start: 2019-11-13 | End: 2019-11-13 | Stop reason: HOSPADM

## 2019-11-13 RX ORDER — ONDANSETRON 4 MG/1
4 TABLET, ORALLY DISINTEGRATING ORAL EVERY 30 MIN PRN
Status: DISCONTINUED | OUTPATIENT
Start: 2019-11-13 | End: 2019-11-13 | Stop reason: HOSPADM

## 2019-11-13 RX ORDER — DEXAMETHASONE SODIUM PHOSPHATE 4 MG/ML
10 INJECTION, SOLUTION INTRA-ARTICULAR; INTRALESIONAL; INTRAMUSCULAR; INTRAVENOUS; SOFT TISSUE ONCE
Status: COMPLETED | OUTPATIENT
Start: 2019-11-13 | End: 2019-11-13

## 2019-11-13 RX ORDER — ONDANSETRON 2 MG/ML
4 INJECTION INTRAMUSCULAR; INTRAVENOUS EVERY 6 HOURS PRN
Status: DISCONTINUED | OUTPATIENT
Start: 2019-11-13 | End: 2019-11-15 | Stop reason: HOSPADM

## 2019-11-13 RX ORDER — EPHEDRINE SULFATE 50 MG/ML
INJECTION, SOLUTION INTRAMUSCULAR; INTRAVENOUS; SUBCUTANEOUS PRN
Status: DISCONTINUED | OUTPATIENT
Start: 2019-11-13 | End: 2019-11-13

## 2019-11-13 RX ORDER — OXYCODONE HCL 5 MG/5 ML
5-10 SOLUTION, ORAL ORAL EVERY 4 HOURS PRN
Status: DISCONTINUED | OUTPATIENT
Start: 2019-11-13 | End: 2019-11-15

## 2019-11-13 RX ORDER — NALOXONE HYDROCHLORIDE 0.4 MG/ML
.1-.4 INJECTION, SOLUTION INTRAMUSCULAR; INTRAVENOUS; SUBCUTANEOUS
Status: DISCONTINUED | OUTPATIENT
Start: 2019-11-13 | End: 2019-11-13 | Stop reason: HOSPADM

## 2019-11-13 RX ORDER — SODIUM CHLORIDE, SODIUM LACTATE, POTASSIUM CHLORIDE, CALCIUM CHLORIDE 600; 310; 30; 20 MG/100ML; MG/100ML; MG/100ML; MG/100ML
INJECTION, SOLUTION INTRAVENOUS CONTINUOUS
Status: DISCONTINUED | OUTPATIENT
Start: 2019-11-13 | End: 2019-11-13 | Stop reason: HOSPADM

## 2019-11-13 RX ORDER — IBUPROFEN 100 MG/5ML
600 SUSPENSION, ORAL (FINAL DOSE FORM) ORAL EVERY 6 HOURS PRN
Status: DISCONTINUED | OUTPATIENT
Start: 2019-11-13 | End: 2019-11-15

## 2019-11-13 RX ORDER — ONDANSETRON 4 MG/1
4 TABLET, ORALLY DISINTEGRATING ORAL EVERY 6 HOURS PRN
Status: DISCONTINUED | OUTPATIENT
Start: 2019-11-13 | End: 2019-11-15 | Stop reason: HOSPADM

## 2019-11-13 RX ORDER — OXYMETAZOLINE HYDROCHLORIDE 0.05 G/100ML
SPRAY NASAL PRN
Status: DISCONTINUED | OUTPATIENT
Start: 2019-11-13 | End: 2019-11-13 | Stop reason: HOSPADM

## 2019-11-13 RX ORDER — LIDOCAINE HYDROCHLORIDE AND EPINEPHRINE 10; 10 MG/ML; UG/ML
INJECTION, SOLUTION INFILTRATION; PERINEURAL PRN
Status: DISCONTINUED | OUTPATIENT
Start: 2019-11-13 | End: 2019-11-13 | Stop reason: HOSPADM

## 2019-11-13 RX ORDER — OXYCODONE HCL 5 MG/5 ML
5 SOLUTION, ORAL ORAL EVERY 4 HOURS PRN
Qty: 240 ML | Refills: 0 | Status: SHIPPED | OUTPATIENT
Start: 2019-11-13 | End: 2019-11-21

## 2019-11-13 RX ORDER — SODIUM CHLORIDE, SODIUM LACTATE, POTASSIUM CHLORIDE, CALCIUM CHLORIDE 600; 310; 30; 20 MG/100ML; MG/100ML; MG/100ML; MG/100ML
INJECTION, SOLUTION INTRAVENOUS CONTINUOUS PRN
Status: DISCONTINUED | OUTPATIENT
Start: 2019-11-13 | End: 2019-11-13

## 2019-11-13 RX ORDER — LIDOCAINE HYDROCHLORIDE 20 MG/ML
INJECTION, SOLUTION INFILTRATION; PERINEURAL PRN
Status: DISCONTINUED | OUTPATIENT
Start: 2019-11-13 | End: 2019-11-13

## 2019-11-13 RX ORDER — FENTANYL CITRATE 50 UG/ML
25-50 INJECTION, SOLUTION INTRAMUSCULAR; INTRAVENOUS EVERY 5 MIN PRN
Status: DISCONTINUED | OUTPATIENT
Start: 2019-11-13 | End: 2019-11-13 | Stop reason: HOSPADM

## 2019-11-13 RX ORDER — OXYCODONE HCL 5 MG/5 ML
5 SOLUTION, ORAL ORAL EVERY 4 HOURS PRN
Status: DISCONTINUED | OUTPATIENT
Start: 2019-11-13 | End: 2019-11-13

## 2019-11-13 RX ORDER — PROPOFOL 10 MG/ML
INJECTION, EMULSION INTRAVENOUS PRN
Status: DISCONTINUED | OUTPATIENT
Start: 2019-11-13 | End: 2019-11-13

## 2019-11-13 RX ORDER — LAMOTRIGINE 100 MG/1
50 TABLET ORAL DAILY
Status: DISCONTINUED | OUTPATIENT
Start: 2019-11-14 | End: 2019-11-15 | Stop reason: HOSPADM

## 2019-11-13 RX ORDER — ONDANSETRON 2 MG/ML
4 INJECTION INTRAMUSCULAR; INTRAVENOUS EVERY 30 MIN PRN
Status: DISCONTINUED | OUTPATIENT
Start: 2019-11-13 | End: 2019-11-13 | Stop reason: HOSPADM

## 2019-11-13 RX ORDER — DEXAMETHASONE SODIUM PHOSPHATE 4 MG/ML
INJECTION, SOLUTION INTRA-ARTICULAR; INTRALESIONAL; INTRAMUSCULAR; INTRAVENOUS; SOFT TISSUE PRN
Status: DISCONTINUED | OUTPATIENT
Start: 2019-11-13 | End: 2019-11-13

## 2019-11-13 RX ORDER — PROMETHAZINE HYDROCHLORIDE 25 MG/ML
0.25 INJECTION INTRAMUSCULAR; INTRAVENOUS EVERY 6 HOURS PRN
Status: DISCONTINUED | OUTPATIENT
Start: 2019-11-13 | End: 2019-11-13 | Stop reason: CLARIF

## 2019-11-13 RX ORDER — HYDROMORPHONE HYDROCHLORIDE 1 MG/ML
.3-.5 INJECTION, SOLUTION INTRAMUSCULAR; INTRAVENOUS; SUBCUTANEOUS EVERY 10 MIN PRN
Status: DISCONTINUED | OUTPATIENT
Start: 2019-11-13 | End: 2019-11-13 | Stop reason: HOSPADM

## 2019-11-13 RX ORDER — NALOXONE HYDROCHLORIDE 0.4 MG/ML
.1-.4 INJECTION, SOLUTION INTRAMUSCULAR; INTRAVENOUS; SUBCUTANEOUS
Status: DISCONTINUED | OUTPATIENT
Start: 2019-11-13 | End: 2019-11-15 | Stop reason: HOSPADM

## 2019-11-13 RX ORDER — ALPRAZOLAM 0.5 MG
1 TABLET ORAL 2 TIMES DAILY PRN
Status: DISCONTINUED | OUTPATIENT
Start: 2019-11-13 | End: 2019-11-15 | Stop reason: HOSPADM

## 2019-11-13 RX ORDER — LIDOCAINE 40 MG/G
CREAM TOPICAL
Status: DISCONTINUED | OUTPATIENT
Start: 2019-11-13 | End: 2019-11-15 | Stop reason: HOSPADM

## 2019-11-13 RX ORDER — ONDANSETRON 2 MG/ML
INJECTION INTRAMUSCULAR; INTRAVENOUS PRN
Status: DISCONTINUED | OUTPATIENT
Start: 2019-11-13 | End: 2019-11-13

## 2019-11-13 RX ORDER — LIDOCAINE 40 MG/G
CREAM TOPICAL
Status: DISCONTINUED | OUTPATIENT
Start: 2019-11-13 | End: 2019-11-13 | Stop reason: HOSPADM

## 2019-11-13 RX ORDER — ACETAMINOPHEN 650 MG/1
650 SUPPOSITORY RECTAL EVERY 4 HOURS PRN
Status: DISCONTINUED | OUTPATIENT
Start: 2019-11-13 | End: 2019-11-14 | Stop reason: DRUGHIGH

## 2019-11-13 RX ORDER — MEPERIDINE HYDROCHLORIDE 25 MG/ML
12.5 INJECTION INTRAMUSCULAR; INTRAVENOUS; SUBCUTANEOUS
Status: DISCONTINUED | OUTPATIENT
Start: 2019-11-13 | End: 2019-11-13 | Stop reason: HOSPADM

## 2019-11-13 RX ORDER — FENTANYL CITRATE 50 UG/ML
INJECTION, SOLUTION INTRAMUSCULAR; INTRAVENOUS PRN
Status: DISCONTINUED | OUTPATIENT
Start: 2019-11-13 | End: 2019-11-13

## 2019-11-13 RX ORDER — METOCLOPRAMIDE HYDROCHLORIDE 5 MG/ML
10 INJECTION INTRAMUSCULAR; INTRAVENOUS ONCE
Status: COMPLETED | OUTPATIENT
Start: 2019-11-14 | End: 2019-11-14

## 2019-11-13 RX ORDER — MORPHINE SULFATE 2 MG/ML
1-2 INJECTION, SOLUTION INTRAMUSCULAR; INTRAVENOUS
Status: DISCONTINUED | OUTPATIENT
Start: 2019-11-13 | End: 2019-11-15

## 2019-11-13 RX ADMIN — HYDROMORPHONE HYDROCHLORIDE 0.25 MG: 1 INJECTION, SOLUTION INTRAMUSCULAR; INTRAVENOUS; SUBCUTANEOUS at 11:37

## 2019-11-13 RX ADMIN — HYDROMORPHONE HYDROCHLORIDE 0.3 MG: 1 INJECTION, SOLUTION INTRAMUSCULAR; INTRAVENOUS; SUBCUTANEOUS at 12:58

## 2019-11-13 RX ADMIN — SODIUM CHLORIDE, POTASSIUM CHLORIDE, SODIUM LACTATE AND CALCIUM CHLORIDE: 600; 310; 30; 20 INJECTION, SOLUTION INTRAVENOUS at 11:22

## 2019-11-13 RX ADMIN — ONDANSETRON 4 MG: 2 INJECTION INTRAMUSCULAR; INTRAVENOUS at 22:34

## 2019-11-13 RX ADMIN — SODIUM CHLORIDE, POTASSIUM CHLORIDE, SODIUM LACTATE AND CALCIUM CHLORIDE: 600; 310; 30; 20 INJECTION, SOLUTION INTRAVENOUS at 10:30

## 2019-11-13 RX ADMIN — FENTANYL CITRATE 25 MCG: 50 INJECTION, SOLUTION INTRAMUSCULAR; INTRAVENOUS at 12:55

## 2019-11-13 RX ADMIN — FENTANYL CITRATE 50 MCG: 50 INJECTION, SOLUTION INTRAMUSCULAR; INTRAVENOUS at 11:17

## 2019-11-13 RX ADMIN — MORPHINE SULFATE 2 MG: 2 INJECTION, SOLUTION INTRAMUSCULAR; INTRAVENOUS at 20:41

## 2019-11-13 RX ADMIN — PHENYLEPHRINE HYDROCHLORIDE 200 MCG: 10 INJECTION INTRAVENOUS at 10:55

## 2019-11-13 RX ADMIN — PHENYLEPHRINE HYDROCHLORIDE 300 MCG: 10 INJECTION INTRAVENOUS at 10:57

## 2019-11-13 RX ADMIN — FENTANYL CITRATE 25 MCG: 50 INJECTION, SOLUTION INTRAMUSCULAR; INTRAVENOUS at 12:28

## 2019-11-13 RX ADMIN — FENTANYL CITRATE 50 MCG: 50 INJECTION, SOLUTION INTRAMUSCULAR; INTRAVENOUS at 11:12

## 2019-11-13 RX ADMIN — Medication 5 MG: at 10:55

## 2019-11-13 RX ADMIN — HYDROMORPHONE HYDROCHLORIDE 0.5 MG: 1 INJECTION, SOLUTION INTRAMUSCULAR; INTRAVENOUS; SUBCUTANEOUS at 13:26

## 2019-11-13 RX ADMIN — PROPOFOL 150 MG: 10 INJECTION, EMULSION INTRAVENOUS at 10:40

## 2019-11-13 RX ADMIN — ROCURONIUM BROMIDE 30 MG: 10 INJECTION INTRAVENOUS at 11:12

## 2019-11-13 RX ADMIN — HYDROMORPHONE HYDROCHLORIDE 0.2 MG: 1 INJECTION, SOLUTION INTRAMUSCULAR; INTRAVENOUS; SUBCUTANEOUS at 13:13

## 2019-11-13 RX ADMIN — MIDAZOLAM 2 MG: 1 INJECTION INTRAMUSCULAR; INTRAVENOUS at 10:30

## 2019-11-13 RX ADMIN — DEXAMETHASONE SODIUM PHOSPHATE 10 MG: 4 INJECTION, SOLUTION INTRA-ARTICULAR; INTRALESIONAL; INTRAMUSCULAR; INTRAVENOUS; SOFT TISSUE at 10:49

## 2019-11-13 RX ADMIN — PROMETHAZINE HYDROCHLORIDE 25 MG: 25 INJECTION INTRAMUSCULAR; INTRAVENOUS at 20:06

## 2019-11-13 RX ADMIN — MORPHINE SULFATE 2 MG: 2 INJECTION, SOLUTION INTRAMUSCULAR; INTRAVENOUS at 15:57

## 2019-11-13 RX ADMIN — SUGAMMADEX 200 MG: 100 INJECTION, SOLUTION INTRAVENOUS at 12:12

## 2019-11-13 RX ADMIN — SALINE NASAL SPRAY 2 SPRAY: 1.5 SOLUTION NASAL at 15:21

## 2019-11-13 RX ADMIN — SALINE NASAL SPRAY 2 SPRAY: 1.5 SOLUTION NASAL at 20:38

## 2019-11-13 RX ADMIN — ONDANSETRON 4 MG: 2 INJECTION INTRAMUSCULAR; INTRAVENOUS at 11:46

## 2019-11-13 RX ADMIN — ROCURONIUM BROMIDE 50 MG: 10 INJECTION INTRAVENOUS at 10:40

## 2019-11-13 RX ADMIN — DEXTROSE AND SODIUM CHLORIDE: 5; 450 INJECTION, SOLUTION INTRAVENOUS at 16:49

## 2019-11-13 RX ADMIN — FENTANYL CITRATE 50 MCG: 50 INJECTION, SOLUTION INTRAMUSCULAR; INTRAVENOUS at 12:37

## 2019-11-13 RX ADMIN — LIDOCAINE HYDROCHLORIDE 80 MG: 20 INJECTION, SOLUTION INFILTRATION; PERINEURAL at 10:40

## 2019-11-13 RX ADMIN — FENTANYL CITRATE 150 MCG: 50 INJECTION, SOLUTION INTRAMUSCULAR; INTRAVENOUS at 10:40

## 2019-11-13 RX ADMIN — DEXAMETHASONE SODIUM PHOSPHATE 10 MG: 4 INJECTION, SOLUTION INTRA-ARTICULAR; INTRALESIONAL; INTRAMUSCULAR; INTRAVENOUS; SOFT TISSUE at 20:39

## 2019-11-13 RX ADMIN — HYDROMORPHONE HYDROCHLORIDE 0.25 MG: 1 INJECTION, SOLUTION INTRAMUSCULAR; INTRAVENOUS; SUBCUTANEOUS at 11:32

## 2019-11-13 ASSESSMENT — MIFFLIN-ST. JEOR: SCORE: 1507.75

## 2019-11-13 NOTE — ANESTHESIA POSTPROCEDURE EVALUATION
Anesthesia POST Procedure Evaluation    Patient: Tessie Mcdaniels   MRN:     [id number] Gender:   female   Age:    54 year old :      1965        Preoperative Diagnosis: Obstructive Sleep Apnea   Procedure(s):  Uvulopalatopharyngoplasty, bilateral tonsilectomy and bilateral inferior turbinoplasty  Bilateral Tonsillectomy  Turbinectomy   Postop Comments: No value filed.       Anesthesia Type:  Not documented  General    Reportable Event: NO     PAIN: Uncomplicated   Sign Out status: Comfortable, Well controlled pain     PONV: No PONV   Sign Out status:  No Nausea or Vomiting     Neuro/Psych: Uneventful perioperative course   Sign Out Status: Preoperative baseline; Age appropriate mentation     Airway/Resp.: Uneventful perioperative course   Sign Out Status: Non labored breathing, age appropriate RR; Resp. Status within EXPECTED Parameters     CV: Uneventful perioperative course   Sign Out status: Appropriate BP and perfusion indices; Appropriate HR/Rhythm     Disposition:   Sign Out in:  PACU  Disposition:  Floor  Recovery Course: Uneventful  Follow-Up: Not required     Comments/Narrative:  Will be going to 6th floor.  02 /NC and ETCO2 monitoring overnight.  Stable.  Throat is sore, as expected.  No apparent anesthetic complications.           Last Anesthesia Record Vitals:  CRNA VITALS  2019 1150 - 2019 1250      2019             Pulse:  71    SpO2:  95 %    Resp Rate (observed):  (!) 3          Last PACU Vitals:  Vitals Value Taken Time   /78 2019  1:40 PM   Temp 36.9  C (98.4  F) 2019  1:00 PM   Pulse 90 2019  1:40 PM   Resp 13 2019  1:30 PM   SpO2 96 % 2019  1:43 PM   Temp src     NIBP     Pulse     SpO2     Resp     Temp     Ht Rate     Temp 2     Vitals shown include unvalidated device data.      Electronically Signed By: Dennise Padilla MD, 2019, 1:45 PM

## 2019-11-13 NOTE — PROVIDER NOTIFICATION
ENT text paged re: Pt states she feels she would be unable to swallow any oral pills, but is in pain. Please advise if she would be able to have IV pain medication.

## 2019-11-13 NOTE — OP NOTE
PREOPERATIVE DIAGNOSIS:  severe obstructive sleep apnea.             Nasal congestion and inferior turbinate hypertrophy      POSTOPERATIVE DIAGNOSIS:  Same       OPERATIVE PROCEDURES:    1.  Bilateral tonsillectomy.    2.  Uvulopalatopharyngoplasty.    3. Bilateral inferior turbinate reductions, intramural cautery  4. Bilateral inferior turbinate outfractures      SURGEON:  Evita Hampton MD        ASSISTANT:  None     ANESTHESIA:  General endotracheal.        SPECIMENS REMOVED: right and left tonsils        COMPLICATIONS:  None.        ESTIMATED BLOOD LOSS:  20 ml        INDICATIONS:  54 year old female with a history of obstructive sleep apnea.  The patient has a difficult time tolerating CPAP; therefore, she presents for surgical management.  Additionally she has nasal congestion in the setting of bilateral inferior turbinate hypertrophy with no response to nasal steroid sprays.      FINDINGS:  bilateral inferior turbinate hypertrophy.  Tonsils are 1+.  Soft palate and uvula drape against the posterior pharynx.          DESCRIPTION:  The patient was brought into the operating room, placed on the operating table in a supine position.  A member of the Department of Anesthesia was present and intubated the patient without difficulty.  The tube was secured and the table was turned 90 degrees.  The inferior turbinate coblator wand probe was then gently inserted submucosally into each turbinate at a setting of 5.  Three passes were made on each side.  The probe was then removed.  Turbinates were outfracured with a Stephen elevator.  Hemostasis was achieved with afrin pledgets.    A shoulder roll was placed and the patient was draped in our normal sterile fashion.  A timeout was then taken to correctly identify the patient and the procedure.  A McIvor mouth retractor was then placed and used to expose the oropharynx.  Approximately 5 mL of 1% lidocaine with epinephrine was injected into the soft palate as well as the  anterior tonsillar pillars.  The left tonsil was then grasped with an Allis clamp and medialized.  An incision was made along the anterior tonsillar border and the tonsillar capsule was identified.  The left tonsil was then dissected away from the underlying musculature using Bovie electrocautery.  The left tonsil was then passed off the field.  The right tonsil was removed in a similar fashion.  There was minimal bleeding on the right side.        We then turned our attention to performing the palatopharyngoplasty.  A 2 cm x 0.5 cm elliptical incision was fashioned on the patient's soft palate.  This area was demucosalized and the underlying glandular tissues were removed.  The soft palate musculature was identified and left intact.  Palatopharyngeus muscle flaps were then created bilaterally and 3-0 sutures were then used to suspend the palatopharyngeus muscle flap to the hook of the hamulus in a lateral expansion technique.  This was done bilaterally.  The incisions were then closed in layers using 3-0 Vicryl for the palatal incision and 3-0 Vicryl for the anterior and posterior tonsillar pillars. The patient was handed back over to Anesthesia, allowed to extubate and transferred to the PACU in stable condition.

## 2019-11-13 NOTE — ANESTHESIA CARE TRANSFER NOTE
Patient: Tessie Mcdaniels    Procedure(s):  Uvulopalatopharyngoplasty, bilateral tonsilectomy and bilateral inferior turbinoplasty  Bilateral Tonsillectomy  Turbinectomy    Diagnosis: Obstructive Sleep Apnea  Diagnosis Additional Information: No value filed.    Anesthesia Type:   General     Note:  Airway :Face Mask  Patient transferred to:PACU  Comments: Tolerated anesthesia well. Good spont resps, awake, VSSHandoff Report: Identifed the Patient, Identified the Reponsible Provider, Reviewed the pertinent medical history, Discussed the surgical course, Reviewed Intra-OP anesthesia mangement and issues during anesthesia, Set expectations for post-procedure period and Allowed opportunity for questions and acknowledgement of understanding      Vitals: (Last set prior to Anesthesia Care Transfer)    CRNA VITALS  11/13/2019 1150 - 11/13/2019 1250      11/13/2019             Pulse:  71    SpO2:  95 %    Resp Rate (observed):  (!) 3                Electronically Signed By: STELLA Howe CRNA  November 13, 2019  1:44 PM

## 2019-11-13 NOTE — PROGRESS NOTES
Observation goals:  -Adequate pain control using oral analgesics: Making progress  -Tolerates oral intake: Not met    Pt arrive on unit around 2:50pm. Pt pretty sleepy and denies pain. CAPNO off bc pt mouth breather and not able to monitor CO2. Pt on pulse soc now. Reporting to oncoming nurse. Will monitor and follow POC.

## 2019-11-14 LAB — GLUCOSE BLDC GLUCOMTR-MCNC: 165 MG/DL (ref 70–99)

## 2019-11-14 PROCEDURE — 00000146 ZZHCL STATISTIC GLUCOSE BY METER IP

## 2019-11-14 PROCEDURE — 25000128 H RX IP 250 OP 636: Performed by: STUDENT IN AN ORGANIZED HEALTH CARE EDUCATION/TRAINING PROGRAM

## 2019-11-14 PROCEDURE — 25000128 H RX IP 250 OP 636: Performed by: OTOLARYNGOLOGY

## 2019-11-14 PROCEDURE — 96375 TX/PRO/DX INJ NEW DRUG ADDON: CPT

## 2019-11-14 PROCEDURE — 25000132 ZZH RX MED GY IP 250 OP 250 PS 637: Performed by: OTOLARYNGOLOGY

## 2019-11-14 PROCEDURE — 25800030 ZZH RX IP 258 OP 636: Performed by: STUDENT IN AN ORGANIZED HEALTH CARE EDUCATION/TRAINING PROGRAM

## 2019-11-14 PROCEDURE — 25000132 ZZH RX MED GY IP 250 OP 250 PS 637: Performed by: STUDENT IN AN ORGANIZED HEALTH CARE EDUCATION/TRAINING PROGRAM

## 2019-11-14 PROCEDURE — G0378 HOSPITAL OBSERVATION PER HR: HCPCS

## 2019-11-14 PROCEDURE — 96376 TX/PRO/DX INJ SAME DRUG ADON: CPT

## 2019-11-14 PROCEDURE — 25800025 ZZH RX 258: Performed by: OTOLARYNGOLOGY

## 2019-11-14 RX ORDER — POLYETHYLENE GLYCOL 3350 17 G/17G
17 POWDER, FOR SOLUTION ORAL DAILY
Status: DISCONTINUED | OUTPATIENT
Start: 2019-11-14 | End: 2019-11-15 | Stop reason: HOSPADM

## 2019-11-14 RX ORDER — CALCIUM CARBONATE 500 MG/1
500 TABLET, CHEWABLE ORAL DAILY PRN
Status: DISCONTINUED | OUTPATIENT
Start: 2019-11-14 | End: 2019-11-15 | Stop reason: HOSPADM

## 2019-11-14 RX ORDER — DEXAMETHASONE SODIUM PHOSPHATE 10 MG/ML
10 INJECTION INTRAMUSCULAR; INTRAVENOUS ONCE
Status: COMPLETED | OUTPATIENT
Start: 2019-11-14 | End: 2019-11-14

## 2019-11-14 RX ORDER — PROCHLORPERAZINE MALEATE 5 MG
5-10 TABLET ORAL EVERY 6 HOURS PRN
Status: DISCONTINUED | OUTPATIENT
Start: 2019-11-14 | End: 2019-11-15 | Stop reason: HOSPADM

## 2019-11-14 RX ORDER — PROCHLORPERAZINE MALEATE 5 MG
5 TABLET ORAL EVERY 6 HOURS PRN
Status: DISCONTINUED | OUTPATIENT
Start: 2019-11-14 | End: 2019-11-14

## 2019-11-14 RX ORDER — OMEPRAZOLE
20 KIT
Status: DISCONTINUED | OUTPATIENT
Start: 2019-11-15 | End: 2019-11-15

## 2019-11-14 RX ORDER — PROCHLORPERAZINE 25 MG
25 SUPPOSITORY, RECTAL RECTAL EVERY 12 HOURS PRN
Status: DISCONTINUED | OUTPATIENT
Start: 2019-11-14 | End: 2019-11-15 | Stop reason: HOSPADM

## 2019-11-14 RX ADMIN — METOCLOPRAMIDE 10 MG: 5 INJECTION, SOLUTION INTRAMUSCULAR; INTRAVENOUS at 00:04

## 2019-11-14 RX ADMIN — DEXTROSE AND SODIUM CHLORIDE: 5; 450 INJECTION, SOLUTION INTRAVENOUS at 21:43

## 2019-11-14 RX ADMIN — OXYCODONE HYDROCHLORIDE 5 MG: 5 SOLUTION ORAL at 00:29

## 2019-11-14 RX ADMIN — ACETAMINOPHEN 650 MG: 325 SOLUTION ORAL at 18:58

## 2019-11-14 RX ADMIN — POLYETHYLENE GLYCOL 3350 17 G: 17 POWDER, FOR SOLUTION ORAL at 18:59

## 2019-11-14 RX ADMIN — CALCIUM CARBONATE (ANTACID) CHEW TAB 500 MG 500 MG: 500 CHEW TAB at 14:55

## 2019-11-14 RX ADMIN — ONDANSETRON 4 MG: 2 INJECTION INTRAMUSCULAR; INTRAVENOUS at 10:24

## 2019-11-14 RX ADMIN — ACETAMINOPHEN 650 MG: 325 SOLUTION ORAL at 10:55

## 2019-11-14 RX ADMIN — DEXTROSE AND SODIUM CHLORIDE: 5; 450 INJECTION, SOLUTION INTRAVENOUS at 08:11

## 2019-11-14 RX ADMIN — ONDANSETRON 4 MG: 2 INJECTION INTRAMUSCULAR; INTRAVENOUS at 16:47

## 2019-11-14 RX ADMIN — ACETAMINOPHEN 650 MG: 325 SOLUTION ORAL at 02:25

## 2019-11-14 RX ADMIN — SALINE NASAL SPRAY 2 SPRAY: 1.5 SOLUTION NASAL at 14:50

## 2019-11-14 RX ADMIN — ACETAMINOPHEN 650 MG: 325 SOLUTION ORAL at 06:20

## 2019-11-14 RX ADMIN — ACETAMINOPHEN 650 MG: 325 SOLUTION ORAL at 21:43

## 2019-11-14 RX ADMIN — DOCUSATE SODIUM 50 MG: 50 LIQUID ORAL at 20:16

## 2019-11-14 RX ADMIN — OXYCODONE HYDROCHLORIDE 5 MG: 5 SOLUTION ORAL at 21:52

## 2019-11-14 RX ADMIN — ACETAMINOPHEN 650 MG: 325 SOLUTION ORAL at 14:50

## 2019-11-14 RX ADMIN — SALINE NASAL SPRAY 2 SPRAY: 1.5 SOLUTION NASAL at 20:22

## 2019-11-14 RX ADMIN — DEXAMETHASONE SODIUM PHOSPHATE 10 MG: 10 INJECTION INTRAMUSCULAR; INTRAVENOUS at 20:16

## 2019-11-14 RX ADMIN — ONDANSETRON 4 MG: 2 INJECTION INTRAMUSCULAR; INTRAVENOUS at 04:48

## 2019-11-14 RX ADMIN — PROCHLORPERAZINE EDISYLATE 10 MG: 5 INJECTION, SOLUTION INTRAMUSCULAR; INTRAVENOUS at 18:58

## 2019-11-14 RX ADMIN — PROMETHAZINE HYDROCHLORIDE 25 MG: 25 INJECTION INTRAMUSCULAR; INTRAVENOUS at 02:25

## 2019-11-14 RX ADMIN — SALINE NASAL SPRAY 2 SPRAY: 1.5 SOLUTION NASAL at 08:13

## 2019-11-14 NOTE — PLAN OF CARE
-Adequate pain control using oral analgesics: No, unable to swallow  -Tolerates oral intake: No  -Cleared for discharge per Provider: No

## 2019-11-14 NOTE — PLAN OF CARE
Observation Goals:    -Adequate pain control using oral analgesics: No, pt unable to swallow oral medications  -Tolerates oral intake: No, only small sips of water  -Cleared for discharge per Provider: No

## 2019-11-14 NOTE — PLAN OF CARE
"Outpatient/Observation goals to be met before discharge home:   -Adequate pain control using oral analgesics: On-going  -Tolerates oral intake: On-going, tolerating sips of water however c/o nausea intermittently.   -Cleared for discharge per Provider.: Not met    BP (!) 140/90 (BP Location: Left arm)   Pulse 64   Temp 99.3  F (37.4  C) (Oral)   Resp 16   Ht 1.676 m (5' 6\")   Wt 90.1 kg (198 lb 10.2 oz)   SpO2 96%   BMI 32.06 kg/m      Patient lethargic/sleepy. Reports of minimal throat pain. Nausea intermittently, not tolerating PO intake. Will continue to monitor.   "

## 2019-11-14 NOTE — PLAN OF CARE
"-Adequate pain control using oral analgesics: No, pt unable to swallow oral medications  -Tolerates oral intake: No, only small sips of water  -Cleared for discharge per Provider: No       BP (!) 153/88 (BP Location: Left arm)   Pulse 109   Temp 98  F (36.7  C) (Oral)   Resp 17   Ht 1.676 m (5' 6\")   Wt 90.1 kg (198 lb 10.2 oz)   SpO2 95%   BMI 32.06 kg/m         pt c/o of pain in throat, PRN oxycodone and scheduled tylenol given. SBA. Pt ambulated to bathroom, voided. Tolerating only sips of water. Pt unable to swallow oral pills. Pt had emesis x3. PRN Reglan and Zofran given to manage. Pt has hx of Sleep apnea, did not want to be put on CPAP. Continuous pulse ox on , pt on 3L via mask, sats in the 90's      "

## 2019-11-14 NOTE — DISCHARGE SUMMARY
Discharge Summary  Tessie Mcdaniels  [id number]  1965    Date of Admission: 11/13/2019  Date of Discharge: 11/15/2019    Admission Diagnosis: Obstructive Sleep Apnea  NEERU (obstructive sleep apnea)  NEERU (obstructive sleep apnea)  Discharge Diagnosis: Same    Procedures:  Date: 11/13  Procedure(s):  Uvulopalatopharyngoplasty, bilateral tonsilectomy and bilateral inferior turbinoplasty  Bilateral Tonsillectomy  Turbinectomy    Pathology: pending    HPI: Tessie Mcdaniels is a 54 year old female with history of NEERU who presented to clinic. After discussion of the risk and benefits, the patient elected to proceed with surgical management.     It was recommended that she undergo operative intervention and the patient consented to the above procedure after detailed explanation of the risks and benefits of said procedure.    Hospital Course: The patient was admitted to the hospital and underwent the above mentioned procedure. She tolerated the procedure without any intra- or piyush-operative complications. Please see the operative report for full details of the procedure. The patient was admitted for post-operative monitoring. Her postoperative course was significant for nausea, vomiting, and pain, but was otherwise uneventful. At discharge, the patient's pain was well controlled, her nausea was controlled, she was voiding on her own, and she was ambulating and tolerating a soft diet.     Discharge Exam:  Vitals:    11/15/19 0710 11/15/19 1115 11/15/19 1201 11/15/19 1507   BP:   (!) 156/85 139/79   BP Location:   Left arm    Pulse: 79  79    Resp: 16  16 16   Temp: 98.4  F (36.9  C) 99  F (37.2  C) 99.2  F (37.3  C) 99.4  F (37.4  C)   TempSrc: Oral Oral Oral Oral   SpO2: 96%  92% 90%   Weight:       Height:           General: A&O x 3, No acute distress  HEENT: PERRL, EOMI without spontaneous or gaze evoked nystagmus. Incision is clean and intact; no bleeding no drainage.   Respiratory: Breathing non-labored on room air, no  stridor, no accessory muscle use.     Discharge Medications:   Arslan Tessie   Home Medication Instructions JASPREET:[id number]    Printed on:11/14/19 0702   Medication Information                      albuterol (PROVENTIL HFA) 108 (90 Base) MCG/ACT inhaler  Inhale 2 puffs into the lungs             ALPRAZolam (XANAX PO)  Take 1 mg by mouth as needed for anxiety              estradiol (ESTRACE) 0.1 MG/GM vaginal cream  Place 1 g vaginally             FLUoxetine HCl (PROZAC PO)  Take 20 mg by mouth daily              fluticasone (FLONASE) 50 MCG/ACT nasal spray  Spray 1 spray in nostril             LamoTRIgine (LAMICTAL PO)  Take 50 mg by mouth daily              meloxicam (MOBIC) 7.5 MG tablet  Take 7.5 mg by mouth daily             oxyCODONE (ROXICODONE) 5 MG/5ML solution  Take 5 mLs (5 mg) by mouth every 4 hours as needed for pain             pravastatin (PRAVACHOL) 40 MG tablet               predniSONE (DELTASONE) 20 MG tablet  Take 1 tablet (20 mg) by mouth daily Start on the third day after surgery             TRAZODONE HCL PO  Take 100 mg by mouth At Bedtime              triamcinolone (KENALOG) 0.1 % external cream                   Discharge Procedure Orders   NO lifting   Order Comments: NO lifting over  20  lbs and no strenuous physical activity for  2  weeks.     Discharge Instructions - diet   Order Comments: Soft/puree foods for 2 weeks.    If you need to swallow pills, please place pill in apple sauce or yogurt to help you swallow it     NO driving or operating machinery    Order Comments: While taking narcotic pain medication     Discharge Instructions   Order Comments: Follow up appointment as instructed by Provider and/or Nurse.    May rinse throat with alcohol-free mouth wash or use salt water gargles.  Avoid brushing your back teeth.    Sleep with head elevated     Reason for your hospital stay   Order Comments: Postoperative management     Adult [facility] Follow-up and recommended labs and tests    Order Comments: Dr. Evita Hampton 12/3/2019 1:30    Appointments on House and/or [facility] (with [facility] or [facility] provider or service). Call [phone] if you haven't heard regarding these appointments within 7 days of discharge.     When to contact your care team   Order Comments: difficulty breathing/swallowing, bleeding from mouth, pain not controlled by medicaiton     Wound care and dressings   Order Comments: Oral care - peridex rinses daily until follow up, magic mouth wash as needed.     Full Code     Order Specific Question Answer Comments   Code status determined by: Discussion with patient/legal decision maker      Diet   Order Comments: Clear liquids first day postoperatively. Advance to full liuqids in the morning after surgery then soft diet a tolerated until follow up     Order Specific Question Answer Comments   Is discharge order? Yes        Dispo: To home in good condition. All of the patient's questions/concerns have been addressed at this time.     Deann Robb MD PGY1  Otolaryngology-Head & Neck Surgery  Please contact ENT by dialing * [phone] and entering job code [phone].

## 2019-11-14 NOTE — PROVIDER NOTIFICATION
ENT text paged re: Pt nauseous, already given PRN Zofran. No other antiemetics ordered please advise.

## 2019-11-14 NOTE — PLAN OF CARE
"Outpatient/Observation goals to be met before discharge home:   /80 (BP Location: Left arm)   Pulse 98   Temp 98.8  F (37.1  C) (Oral)   Resp 16   Ht 1.676 m (5' 6\")   Wt 90.1 kg (198 lb 10.2 oz)   SpO2 97%   BMI 32.06 kg/m      -Adequate pain control using oral analgesics: On-going  -Tolerates oral intake: On-going, tolerating sips of water however c/o nausea with movement   -Cleared for discharge per Provider.: Not met  "

## 2019-11-14 NOTE — PROVIDER NOTIFICATION
"Provider notified re: \"Pt requesting IV Compazine, unable to keep any PO pills down due to nausea. \"  "

## 2019-11-14 NOTE — PROVIDER NOTIFICATION
ENT paged re: Pt still nauseous and vomiting after IV Zofran and IV Phenergan. Unable to give either at this time. Please advise.

## 2019-11-14 NOTE — PLAN OF CARE
-Adequate pain control using oral analgesics: No, pt unable to swallow oral medications  -Tolerates oral intake: No, only small sips of water  -Cleared for discharge per Provider: No    Pt nauseous with vomiting x4. Pt given IV zofran and IV phenergan with little relief. Pt offered other methods to decrease nausea- aromatherapy and small sips of water. Pt reports still being unable to swallow efficiently. Given PRN IV morphine for throat pain with decrease in pain. Will continue to monitor and follow POC.

## 2019-11-15 VITALS
BODY MASS INDEX: 31.92 KG/M2 | TEMPERATURE: 99.4 F | SYSTOLIC BLOOD PRESSURE: 139 MMHG | HEIGHT: 66 IN | OXYGEN SATURATION: 90 % | RESPIRATION RATE: 16 BRPM | DIASTOLIC BLOOD PRESSURE: 79 MMHG | HEART RATE: 79 BPM | WEIGHT: 198.63 LBS

## 2019-11-15 PROBLEM — G47.33 OBSTRUCTIVE SLEEP APNEA: Status: ACTIVE | Noted: 2019-11-15

## 2019-11-15 LAB — GLUCOSE BLDC GLUCOMTR-MCNC: 145 MG/DL (ref 70–99)

## 2019-11-15 PROCEDURE — G0378 HOSPITAL OBSERVATION PER HR: HCPCS

## 2019-11-15 PROCEDURE — 25800025 ZZH RX 258: Performed by: OTOLARYNGOLOGY

## 2019-11-15 PROCEDURE — 25000132 ZZH RX MED GY IP 250 OP 250 PS 637: Performed by: STUDENT IN AN ORGANIZED HEALTH CARE EDUCATION/TRAINING PROGRAM

## 2019-11-15 PROCEDURE — 12000001 ZZH R&B MED SURG/OB UMMC

## 2019-11-15 PROCEDURE — 96376 TX/PRO/DX INJ SAME DRUG ADON: CPT

## 2019-11-15 PROCEDURE — 25000132 ZZH RX MED GY IP 250 OP 250 PS 637: Performed by: OTOLARYNGOLOGY

## 2019-11-15 PROCEDURE — 25000128 H RX IP 250 OP 636: Performed by: STUDENT IN AN ORGANIZED HEALTH CARE EDUCATION/TRAINING PROGRAM

## 2019-11-15 PROCEDURE — 00000146 ZZHCL STATISTIC GLUCOSE BY METER IP

## 2019-11-15 RX ORDER — LAMOTRIGINE 25 MG/1
50 TABLET, CHEWABLE ORAL DAILY
Qty: 28 TABLET | Refills: 0 | Status: SHIPPED | OUTPATIENT
Start: 2019-11-15 | End: 2021-10-29

## 2019-11-15 RX ORDER — CHLORHEXIDINE GLUCONATE ORAL RINSE 1.2 MG/ML
15 SOLUTION DENTAL 2 TIMES DAILY
Qty: 118 ML | Refills: 0 | Status: SHIPPED | OUTPATIENT
Start: 2019-11-15 | End: 2019-11-30

## 2019-11-15 RX ORDER — NALOXONE HYDROCHLORIDE 0.4 MG/ML
.1-.4 INJECTION, SOLUTION INTRAMUSCULAR; INTRAVENOUS; SUBCUTANEOUS
Status: DISCONTINUED | OUTPATIENT
Start: 2019-11-15 | End: 2019-11-15

## 2019-11-15 RX ORDER — LIDOCAINE 40 MG/G
CREAM TOPICAL
Status: DISCONTINUED | OUTPATIENT
Start: 2019-11-15 | End: 2019-11-15 | Stop reason: HOSPADM

## 2019-11-15 RX ORDER — PROCHLORPERAZINE MALEATE 10 MG
10 TABLET ORAL EVERY 6 HOURS PRN
Qty: 20 TABLET | Refills: 0 | Status: SHIPPED | OUTPATIENT
Start: 2019-11-15 | End: 2019-11-25

## 2019-11-15 RX ORDER — ACETAMINOPHEN 325 MG/1
650 TABLET ORAL EVERY 4 HOURS
Status: DISCONTINUED | OUTPATIENT
Start: 2019-11-15 | End: 2019-11-15 | Stop reason: HOSPADM

## 2019-11-15 RX ORDER — MELOXICAM 7.5 MG/5ML
7.5 SUSPENSION ORAL DAILY
Qty: 70 ML | Refills: 0 | Status: SHIPPED | OUTPATIENT
Start: 2019-11-15 | End: 2019-11-29

## 2019-11-15 RX ORDER — CALCIUM CARBONATE 500 MG/1
1 TABLET, CHEWABLE ORAL DAILY PRN
Qty: 15 TABLET | Refills: 0 | Status: SHIPPED | OUTPATIENT
Start: 2019-11-15 | End: 2019-11-30

## 2019-11-15 RX ORDER — DIPHENHYDRAMINE HYDROCHLORIDE AND LIDOCAINE HYDROCHLORIDE AND ALUMINUM HYDROXIDE AND MAGNESIUM HYDRO
5-10 KIT EVERY 6 HOURS PRN
Qty: 119 ML | Refills: 0 | Status: SHIPPED | OUTPATIENT
Start: 2019-11-15 | End: 2019-11-30

## 2019-11-15 RX ORDER — IBUPROFEN 600 MG/1
600 TABLET, FILM COATED ORAL EVERY 6 HOURS PRN
Status: DISCONTINUED | OUTPATIENT
Start: 2019-11-15 | End: 2019-11-15 | Stop reason: HOSPADM

## 2019-11-15 RX ORDER — OXYCODONE HYDROCHLORIDE 5 MG/1
5-10 TABLET ORAL EVERY 4 HOURS PRN
Status: DISCONTINUED | OUTPATIENT
Start: 2019-11-15 | End: 2019-11-15 | Stop reason: HOSPADM

## 2019-11-15 RX ORDER — ONDANSETRON HYDROCHLORIDE 4 MG/5ML
4 SOLUTION ORAL EVERY 6 HOURS PRN
Qty: 200 ML | Refills: 0 | Status: SHIPPED | OUTPATIENT
Start: 2019-11-15 | End: 2019-11-25

## 2019-11-15 RX ADMIN — SALINE NASAL SPRAY 2 SPRAY: 1.5 SOLUTION NASAL at 14:35

## 2019-11-15 RX ADMIN — ACETAMINOPHEN 650 MG: 325 SOLUTION ORAL at 08:25

## 2019-11-15 RX ADMIN — DEXTROSE AND SODIUM CHLORIDE: 5; 450 INJECTION, SOLUTION INTRAVENOUS at 11:20

## 2019-11-15 RX ADMIN — OMEPRAZOLE 20 MG: KIT at 08:25

## 2019-11-15 RX ADMIN — ACETAMINOPHEN 650 MG: 325 SOLUTION ORAL at 12:30

## 2019-11-15 RX ADMIN — LAMOTRIGINE 50 MG: 100 TABLET ORAL at 09:26

## 2019-11-15 RX ADMIN — DOCUSATE SODIUM 50 MG: 50 LIQUID ORAL at 08:26

## 2019-11-15 RX ADMIN — POLYETHYLENE GLYCOL 3350 17 G: 17 POWDER, FOR SOLUTION ORAL at 08:25

## 2019-11-15 RX ADMIN — PROCHLORPERAZINE EDISYLATE 10 MG: 5 INJECTION, SOLUTION INTRAMUSCULAR; INTRAVENOUS at 03:08

## 2019-11-15 RX ADMIN — SALINE NASAL SPRAY 2 SPRAY: 1.5 SOLUTION NASAL at 08:25

## 2019-11-15 RX ADMIN — PROCHLORPERAZINE EDISYLATE 10 MG: 5 INJECTION, SOLUTION INTRAMUSCULAR; INTRAVENOUS at 09:27

## 2019-11-15 RX ADMIN — FLUOXETINE 20 MG: 20 CAPSULE ORAL at 09:26

## 2019-11-15 ASSESSMENT — ACTIVITIES OF DAILY LIVING (ADL): ADLS_ACUITY_SCORE: 15

## 2019-11-15 NOTE — PROGRESS NOTES
Care Coordinator Progress Note    Admission Date/Time:  11/13/2019  Attending MD:  Dr Evita Hampton    Observation Status    Data  Chart reviewed, discussed with interdisciplinary team. This morning I spoke with Dr Robb, she reported pt's pain is better; nausea & vomiting are improving. Can discharge when able to take more orally.   Noted pt has been on oxygen 2-3 liters. O IV fluids at 75cc/hr. Compazine IV x2 today. No oral intake recorded under I & O.      Patient was admitted for:  Severe obstructive sleep apnea; nasal congestion & inferior turbinate hypertrophy.  Procedures:  Bilateral tonsillectomy; uvulopalatopharyngoplasty; bilateral inferior turbinate reductions, intramural cautery; bilateral inferior turbinate outfractures.     Concerns with insurance coverage for discharge needs: None. Pt's insurance is Medicare & [employer].   Current Living Situation: Patient lives alone.  Transportation at Discharge: Family or friend will provide. Pt said her mother will give her a ride home.   Barriers to Discharge: Pain control, ability to take oral diet.     Coordination of Care and Referrals  At 10:10am went to give pt COOK notice. She was lethargic, sleeping, called her name and she mumbled but did not awaken. Spoke with pt's nurse and she said pt has been lethargic, titrating oxygen; doctors are aware.   E-mailed Utilization Review and updated them on pt's status, requested they review if pt meets Inpatient status.    At 12:20pm introduced myself to pt. She was awake & alert. Pt signed the COOK notice, given a copy and original placed in chart. She just received a lunch tray and was trying to eat some soup. Pt was just moved from room 518-02 to 513-01. Pt said she has to call the nurse for everything. Pt said her mother will give her a ride home at discharge, mother lives in [place].       Assessment  Pt having post-op pain, nausea & vomiting. Anticipate discharge home when medically stable.      Plan  Anticipated  Discharge Date:  When pain & nausea/vomiting controlled; off IV meds and taking an oral diet.   Anticipated Discharge Plan:  Discharge home.       Hilary Hurst RN Care Coordinator  [address], [facility]

## 2019-11-15 NOTE — PLAN OF CARE
"Outpatient/Observation goals to be met before discharge home:   /77 (BP Location: Left arm)   Pulse 79   Temp 98.4  F (36.9  C) (Oral)   Resp 16   Ht 1.676 m (5' 6\")   Wt 90.1 kg (198 lb 10.2 oz)   SpO2 96%   BMI 32.06 kg/m      -Adequate pain control using oral analgesics: Met  -Tolerates oral intake: On-going, tolerating sips of water and applesauce  -Cleared for discharge per Provider.: Not met  "

## 2019-11-15 NOTE — PLAN OF CARE
Outpatient/Observation goals to be met before discharge home:    PRIMARY DIAGNOSIS: s/p UP3 and tonsilectomy  OUTPATIENT/OBSERVATION GOALS TO BE MET BEFORE DISCHARGE:  Adequate pain control using oral analgesics: YES-pt getting scheduled tylenol and PRN oxycodone which is effective for patient.  Tolerates oral intake: NO-pt tolerated some soup but did throw that up soon after; will continue to monitor.  Cleared for discharge per Provider: NO-not yet cleared for discharge.  *Nurse to notify MD when observation goals have been met and patient is ready for discharge.

## 2019-11-15 NOTE — PLAN OF CARE
Outpatient/Observation goals to be met before discharge home:   -Adequate pain control using oral analgesics: On-going  -Tolerates oral intake: On-going, tolerating sips of water however c/o nausea intermittently. Nausea improved with compazine, tolerated miralax and colace in small sips.   -Cleared for discharge per Provider.: Not met    Patient continues to use 2 L NC when sleeping as her oxygen saturations did when not on oxygen. Nausea improving. Pt ambulating steadily, SBA. Voiding spontaneously. No BM. Tolerating sips of clear liquids.

## 2019-11-15 NOTE — PLAN OF CARE
"Outpatient/Observation goals to be met before discharge home:   BP (!) 156/85 (BP Location: Left arm)   Pulse 79   Temp 99.2  F (37.3  C) (Oral)   Resp 16   Ht 1.676 m (5' 6\")   Wt 90.1 kg (198 lb 10.2 oz)   SpO2 92%   BMI 32.06 kg/m      -Adequate pain control using oral analgesics: Met  -Tolerates oral intake: On-going, tolerating yogurt  -Cleared for discharge per Provider.: Not met  "

## 2019-11-15 NOTE — PLAN OF CARE
Discharge instruction reviewed.  Patient verbalized understanding. PIV removed, patient wheeled to the main lobby. Family awaiting at main lobby. Patient discharged with all belongings.

## 2019-11-15 NOTE — PROGRESS NOTES
"Otolaryngology Progress Note  November 14, 2019    S: Overnight nausea and vomiting. Not able to tolerated much clear liquid PO intake due to nausea. Only took few sips of ginger ale. Pain mildly controlled with liquid tylenol. Decreased amount of narcotics she was taking due to it causing her to be nauseated and sleepy. 4L ON with oxygen saturations in 90s.     O: /77 (BP Location: Left arm)   Pulse 79   Temp 98.4  F (36.9  C) (Oral)   Resp 16   Ht 1.676 m (5' 6\")   Wt 90.1 kg (198 lb 10.2 oz)   SpO2 96%   BMI 32.06 kg/m     General: Alert and oriented, No acute distress   HEENT: EOMI. HB 1/6. Post surgical changes to oropharynx, surgical eschar in tonsillar beds. Suture stitch on soft palate. No bleeding or clots noted.     Pulmonary: Breathing non-labored, no stridor, no accessory muscle use.      Intake/Output Summary (Last 24 hours) at 11/15/2019 0831  Last data filed at 11/14/2019 1020  Gross per 24 hour   Intake --   Output 100 ml   Net -100 ml       A/P: Tessie Mcdaniels is a 54 year old female with a past medical history of  HLD, IBS, Panic disorder, KANDI, MDD, and sever NEERU s/p Uvulopalatopharyngoplasty, bilateral tonsilectomy and bilateral inferior turbinoplasty, Bilateral Tonsillectomy 11/13. POD1. Admitted to observation for pain control, n/v, and progression of oral intake.     - Pain control:    - Scheduled tylenol (liquid or suppository)   - Morphine, oxy prn   - Toradol only if pain not controled by above   - Increase oral intake. Clear liquid diet and progress to full liquid.  - Control N/V. Prn zofran, compazine  - GERD. Omeprazole, TUMs  - Increase Bowel regimen: Miralax and colace       Dispo: discharge pending control of N/V, pain, and increase oral intake    -- Patient and above plan discussed with Dr. Evita Robb MD PGY1   Otolaryngology-Head & Neck Surgery  Please contact ENT with questions by dialing * [phone] and entering job code [phone] when prompted.  "

## 2019-11-15 NOTE — PROGRESS NOTES
"Otolaryngology Progress Note  November 15, 2019    S: Midday check in to see if patient increase PO intake. Patient endorses drinking water, apple juice, eating yoghurt and chicken noodle soup. Her nausea and pain are controlled and she is able to take oral medications.     O: BP (!) 156/85 (BP Location: Left arm)   Pulse 79   Temp 99.2  F (37.3  C) (Oral)   Resp 16   Ht 1.676 m (5' 6\")   Wt 90.1 kg (198 lb 10.2 oz)   SpO2 92%   BMI 32.06 kg/m         General: Alert and oriented, No acute distress              HEENT: EOMI. HB 1/6. Post surgical changes to oropharynx, surgical eschar in tonsillar beds. Suture stitch on soft palate. No bleeding or clots noted.                Pulmonary: Breathing non-labored, no stridor, no accessory muscle use.    A/P: Tessie Mcdaniels is a 54 year old female with a past medical history of  HLD, IBS, Panic disorder, KANDI, MDD, and sever NEERU s/p Uvulopalatopharyngoplasty, bilateral tonsilectomy and bilateral inferior turbinoplasty, Bilateral Tonsillectomy 11/13. POD1. Admitted to observation for pain control, n/v, and progression of oral intake. Possible discharge today if patient continues to do well with oral intake.      - Pain control:               - Scheduled tylenol (liquid or suppository)              - Morphine discontinued   - oxy prn              - Toradol only if pain not controled by above   - Increase oral intake. Clear liquid diet and progress to full liquid.  - Control N/V. Prn zofran, compazine  - GERD. Omeprazole, TUMs  - Increase Bowel regimen: Miralax and colace         Dispo: discharge pending control of N/V, pain, and increase oral intake    - Patient and above plan discussed with Dr. Evita Hampton.     Deann Robb MD PGY1   Otolaryngology-Head & Neck Surgery  Please contact ENT with questions by dialing * [phone] and entering job code [phone] when prompted.  "

## 2019-11-16 NOTE — PLAN OF CARE
"-Adequate pain control using oral analgesics: Yes  -Tolerates oral intake: Yes, tolerating sips of water  -Cleared for discharge per Provider: Yes    /79   Pulse 79   Temp 99.4  F (37.4  C) (Oral)   Resp 16   Ht 1.676 m (5' 6\")   Wt 90.1 kg (198 lb 10.2 oz)   SpO2 90%   BMI 32.06 kg/m      "

## 2019-11-18 ENCOUNTER — PATIENT OUTREACH (OUTPATIENT)
Dept: CARE COORDINATION | Facility: CLINIC | Age: 54
End: 2019-11-18

## 2019-11-18 LAB — COPATH REPORT: NORMAL

## 2019-11-20 ENCOUNTER — PATIENT OUTREACH (OUTPATIENT)
Dept: OTOLARYNGOLOGY | Facility: CLINIC | Age: 54
End: 2019-11-20

## 2019-11-20 DIAGNOSIS — G89.18 ACUTE POST-OPERATIVE PAIN: Primary | ICD-10-CM

## 2019-11-20 DIAGNOSIS — G89.18 ACUTE POST-OPERATIVE PAIN: ICD-10-CM

## 2019-11-20 RX ORDER — MELOXICAM 15 MG/1
15 TABLET ORAL DAILY
Qty: 10 TABLET | Refills: 0 | Status: ON HOLD | OUTPATIENT
Start: 2019-11-20 | End: 2021-09-14

## 2019-11-20 RX ORDER — KETOROLAC TROMETHAMINE 10 MG/1
10 TABLET, FILM COATED ORAL EVERY 6 HOURS PRN
Qty: 30 TABLET | Refills: 0 | Status: SHIPPED | OUTPATIENT
Start: 2019-11-20 | End: 2019-11-20 | Stop reason: ALTCHOICE

## 2019-11-20 RX ORDER — LIDOCAINE HYDROCHLORIDE 20 MG/ML
15 SOLUTION OROPHARYNGEAL
Qty: 1200 ML | Refills: 0 | Status: SHIPPED | OUTPATIENT
Start: 2019-11-20 | End: 2019-11-30

## 2019-11-20 NOTE — PROGRESS NOTES
Per Dr. Hampton, Meloxicam has been sent to patients pharmacy as patient reports she has taken this medication before while being on Prozac and experienced no issues. Patient will contact the clinic and stop medication if patient experiences worsening of symptoms and/or any signs of bleeding such as bleeding from your gums, nosebleeds, unusual bruising, or dark stools. Patient verbalizes understanding and denies any further questions or concerns at this time.

## 2019-11-20 NOTE — PROGRESS NOTES
Patient calling to report hospital admission on 11/16 related to accidental oxycodone overdose.     Patient underwent surgical procedure with Dr. Hampton on 11/13/2019. Surgical procedure included Uvulopalatopharyngoplasty, bilateral tonsillectomy, and bilateral inferior turbinectomy. Patient was discharged on 11/16 from [facility]. Patient reports that she was admitted to [facility] on 11/16 after accidental overdose resulting in loss of consciousness. Patient was discharged from [facility] on 11/19/2019. Patient calling to update Dr. Hampton as well as discuss alternatives for post-op pain management as patient reports she does not want to continue taking oxycodone.       Dr. Hampton is recommending the following for post-operative pain management: Tylenol as needed, Lidocaine solution-swish and spit, Toradol, and prednisone. Patient is currently taking Prozac 20 mg daily which is a contraindication with Toradol. RN explained to patient that taking these two medications together increases the chance of experiencing a GI bleed. Per Dr. Hampton, patient may take Toradol as long as her Prozac is held. Due to the possibility of withdrawal from stopping Prozac abruptly, Dr. Hampton is okay with the patient taking it every other day while on Toradol. Patient reports that she does not want to hold her Prozac. She reports that she has been prescribed Meloxicam in the past while taking Prozac and had no issues. RN has requested advisement from Dr. Hampton regarding a prescription for Meloxicam rather than Toradol. In the meantime, RN advised patient to take ibuprofen as recommended by Dr. Hampton. Patient is in agreement with this plan. Patient denies any further questions or concerns at this time.     Danna Grant RN

## 2019-11-22 ENCOUNTER — PATIENT OUTREACH (OUTPATIENT)
Dept: OTOLARYNGOLOGY | Facility: CLINIC | Age: 54
End: 2019-11-22

## 2019-11-22 DIAGNOSIS — G89.18 ACUTE POST-OPERATIVE PAIN: Primary | ICD-10-CM

## 2019-11-22 RX ORDER — PREDNISONE 20 MG/1
20 TABLET ORAL DAILY
Qty: 3 TABLET | Refills: 0 | Status: SHIPPED | OUTPATIENT
Start: 2019-11-22 | End: 2019-11-25

## 2019-11-22 NOTE — PROGRESS NOTES
Patient calling to request an additional prescription of oxycodone. Per discussion with Dr. Hampton the recommendation is a short course of prednisone. Dr. Hampton is not comfortable prescribing any additional narcotic pain medication for this patient considering her recent admission to [facility] due to an accidental overdose of oxycodone that was prescribed post-operative Bilateral Tonsillectomy on 11/13/2019. Patient reports that she obtained a prescription for tramadol from her primary care doctor. Dr. Hampton updated regarding this. Patient is in agreement with this plan. Patient denies any further questions or concerns at this time.     Danna Grant RN

## 2019-11-26 ENCOUNTER — OFFICE VISIT (OUTPATIENT)
Dept: OTOLARYNGOLOGY | Facility: CLINIC | Age: 54
End: 2019-11-26
Payer: COMMERCIAL

## 2019-11-26 DIAGNOSIS — G47.33 OSA (OBSTRUCTIVE SLEEP APNEA): Primary | ICD-10-CM

## 2019-11-26 RX ORDER — TRAMADOL HYDROCHLORIDE 50 MG/1
50 TABLET ORAL
Status: ON HOLD | COMMUNITY
Start: 2019-11-22 | End: 2021-09-14

## 2019-11-26 ASSESSMENT — PAIN SCALES - GENERAL: PAINLEVEL: NO PAIN (1)

## 2019-11-26 NOTE — LETTER
11/26/2019       RE: Tessie Mcdaniels  [address]  [place] [address]     Dear Colleague,    Thank you for referring your patient, Tessie Mcdaniels, to the [facility] EAR NOSE [facility] THROAT at [facility]. Please see a copy of my visit note below.    CC: Status post tonsillectomy with uvulopalatopharyngoplasty and bilateral inferior turbinate reductions on November 13, 2019    HPI: Patient presents for her first postoperative visit.  Her postoperative course was complicated by oxycodone overdose requiring hospitalization and reversal.  Patient reports that she believes she pulled out 5 mL of oxycodone.  She notes that are measuring cup was very difficult to read.  She seemed to have a reaction to the oxycodone and that it made her little bit more agitated.  She then took a trazodone to try to go to sleep and after that she cannot remember what happened.  She woke up in the hospital.  According to her mother about a quarter of the bottle was missing when she came by and checked on the patient in the morning.  Patient does not recall taking all that medication but she believes she must have.  Right now she is controlling pain with meloxicam, Tylenol and tramadol.  We did also try lidocaine but she found it difficult to use.  She responded pretty decently to the prednisone as well.  She does not feel much change in her nasal breathing does feel a little congested to her.    PE:  GEn : nad  NOSE: turbinates are healing in.  No significant crusting is seen.  O/C: Soft palate is healing in.  Vicryl sutures were removed today.    A/P:  Patient is healing as expected.  I would like for the patient to continue with her current pain regimen.  I did discuss with her that she would like want to also taper off of the tramadol over the course of the next week here.  I would like her to continue to do nasal saline  sprays for the next 2 weeks.  I will see her back in 1 month for final  postoperative check.  Patient and I discussed the events leading up to her narcotic overdose.    Again, thank you for allowing me to participate in the care of your patient.      Sincerely,    Evita Hampton MD

## 2019-11-26 NOTE — PROGRESS NOTES
CC: Status post tonsillectomy with uvulopalatopharyngoplasty and bilateral inferior turbinate reductions on November 13, 2019    HPI: Patient presents for her first postoperative visit.  Her postoperative course was complicated by oxycodone overdose requiring hospitalization and reversal.  Patient reports that she believes she pulled out 5 mL of oxycodone.  She notes that are measuring cup was very difficult to read.  She seemed to have a reaction to the oxycodone and that it made her little bit more agitated.  She then took a trazodone to try to go to sleep and after that she cannot remember what happened.  She woke up in the hospital.  According to her mother about a quarter of the bottle was missing when she came by and checked on the patient in the morning.  Patient does not recall taking all that medication but she believes she must have.  Right now she is controlling pain with meloxicam, Tylenol and tramadol.  We did also try lidocaine but she found it difficult to use.  She responded pretty decently to the prednisone as well.  She does not feel much change in her nasal breathing does feel a little congested to her.    PE:  GEn : nad  NOSE: turbinates are healing in.  No significant crusting is seen.  O/C: Soft palate is healing in.  Vicryl sutures were removed today.    A/P:  Patient is healing as expected.  I would like for the patient to continue with her current pain regimen.  I did discuss with her that she would like want to also taper off of the tramadol over the course of the next week here.  I would like her to continue to do nasal saline  sprays for the next 2 weeks.  I will see her back in 1 month for final postoperative check.  Patient and I discussed the events leading up to her narcotic overdose.

## 2019-11-26 NOTE — PATIENT INSTRUCTIONS
1. You were seen in the ENT Clinic today by .  If you have any questions or concerns after your appointment, please call   - Option 1: ENT Clinic: [phone]    2.   Plan to return to clinic in 4 weeks.

## 2019-11-26 NOTE — NURSING NOTE
Chief Complaint   Patient presents with     Post-op Visit     DOS 11/13/19, tonsillectomy     Did not obtain vitals.    Joy Brar EMT

## 2019-12-13 ENCOUNTER — PATIENT OUTREACH (OUTPATIENT)
Dept: OTOLARYNGOLOGY | Facility: CLINIC | Age: 54
End: 2019-12-13

## 2019-12-13 DIAGNOSIS — R07.0 THROAT PAIN: Primary | ICD-10-CM

## 2019-12-13 RX ORDER — PREDNISONE 20 MG/1
20 TABLET ORAL DAILY
Qty: 4 TABLET | Refills: 0 | Status: SHIPPED | OUTPATIENT
Start: 2019-12-13 | End: 2019-12-17

## 2019-12-13 NOTE — PROGRESS NOTES
Patient calling to report throat pain secondary to cold symptoms. Patient reports following up with her primary care provider due to concerns of strep throat. Patient did not have a culture taken, but she did have labs drawn to evaluate for elevated wbc count. Patient's wbc count was in the normal range. No therapy was initiated at that visit. Patient denies any fever/chills. Denies any visual abnormality to the throat. RN discussed symptoms with Dr. Hampton and the recommendation is for the patient to begin a short course of prednisone. RN sent prednisone 20 mg to be taken daily PO for 4 days. RN instructed patient to call should a fever develop. Patient is in agreement with this plan and denies any further questions or concerns at this time.     Danna Grant RN

## 2019-12-19 ENCOUNTER — OFFICE VISIT (OUTPATIENT)
Dept: OTOLARYNGOLOGY | Facility: CLINIC | Age: 54
End: 2019-12-19
Payer: COMMERCIAL

## 2019-12-19 VITALS
OXYGEN SATURATION: 96 % | SYSTOLIC BLOOD PRESSURE: 118 MMHG | WEIGHT: 189 LBS | BODY MASS INDEX: 30.37 KG/M2 | HEIGHT: 66 IN | HEART RATE: 77 BPM | DIASTOLIC BLOOD PRESSURE: 76 MMHG

## 2019-12-19 DIAGNOSIS — G47.33 OSA (OBSTRUCTIVE SLEEP APNEA): Primary | ICD-10-CM

## 2019-12-19 ASSESSMENT — MIFFLIN-ST. JEOR: SCORE: 1464.05

## 2019-12-19 ASSESSMENT — PAIN SCALES - GENERAL: PAINLEVEL: NO PAIN (0)

## 2019-12-19 NOTE — PATIENT INSTRUCTIONS
Thank you for choosing  [facility]  Please follow up with Dr. Hampton as needed or sooner if symptoms worsens or does not improve.   Will contact you about results.    [phone] appointment scheduling option 1 and nurse advice option 3.

## 2019-12-19 NOTE — PROGRESS NOTES
CC: s/p UPPP and inferior turbinate reductions    HPI: Patient returns to clinic today for follow-up.  She reports that she is still snoring.  She also feels like the back of her throat is swollen.  She denies any pain though.  She is able to swallow.    PE:  GEN: nad  NOSE: Turbinates are thin bilaterally.  No crusting is seen.  O/C: Soft palate and tonsillar fossa's are healing quite well.  No significant edema or erythema seen    A/P:  Patient returns for her 1 month postoperative visit.  Overall the healing is good.  I reassured her that she does not have any significant edema.  I suspect some of the sensation that she is feeling in terms of swelling is be due to the nature of the surgery itself in terms of the reconstructive aspect of it.  It typically does fade after 2-3 more months for most people.  At this time would like to set her up for repeat sleep study to see where her sleep apnea severity is at.  We will call her with the results.

## 2019-12-19 NOTE — LETTER
12/19/2019       RE: Tessie Mcdaniels  [address]  [place] [address]     Dear Colleague,    Thank you for referring your patient, Tessie Mcdaniels, to the [facility] EAR NOSE AND THROAT at [facility]. Please see a copy of my visit note below.    CC: s/p UPPP and inferior turbinate reductions    HPI: Patient returns to clinic today for follow-up.  She reports that she is still snoring.  She also feels like the back of her throat is swollen.  She denies any pain though.  She is able to swallow.    PE:  GEN: nad  NOSE: Turbinates are thin bilaterally.  No crusting is seen.  O/C: Soft palate and tonsillar fossa's are healing quite well.  No significant edema or erythema seen    A/P:  Patient returns for her 1 month postoperative visit.  Overall the healing is good.  I reassured her that she does not have any significant edema.  I suspect some of the sensation that she is feeling in terms of swelling is be due to the nature of the surgery itself in terms of the reconstructive aspect of it.  It typically does fade after 2-3 more months for most people.  At this time would like to set her up for repeat sleep study to see where her sleep apnea severity is at.  We will call her with the results.    Again, thank you for allowing me to participate in the care of your patient.      Sincerely,    Evita Hampton MD

## 2020-01-21 ENCOUNTER — TELEPHONE (OUTPATIENT)
Dept: OTOLARYNGOLOGY | Facility: CLINIC | Age: 55
End: 2020-01-21

## 2020-01-21 ENCOUNTER — OFFICE VISIT (OUTPATIENT)
Dept: OTOLARYNGOLOGY | Facility: CLINIC | Age: 55
End: 2020-01-21
Payer: COMMERCIAL

## 2020-01-21 VITALS
HEART RATE: 102 BPM | WEIGHT: 194 LBS | SYSTOLIC BLOOD PRESSURE: 121 MMHG | DIASTOLIC BLOOD PRESSURE: 80 MMHG | BODY MASS INDEX: 31.31 KG/M2

## 2020-01-21 DIAGNOSIS — R05.3 CHRONIC COUGH: Primary | ICD-10-CM

## 2020-01-21 DIAGNOSIS — R07.0 THROAT PAIN: Primary | ICD-10-CM

## 2020-01-21 DIAGNOSIS — R09.89 CHRONIC THROAT CLEARING: ICD-10-CM

## 2020-01-21 DIAGNOSIS — R09.82 POST-NASAL DRIP: ICD-10-CM

## 2020-01-21 DIAGNOSIS — J38.7 IRRITABLE LARYNX: ICD-10-CM

## 2020-01-21 RX ORDER — PANTOPRAZOLE SODIUM 40 MG/1
40 TABLET, DELAYED RELEASE ORAL DAILY
Qty: 30 TABLET | Refills: 0 | Status: SHIPPED | OUTPATIENT
Start: 2020-01-21 | End: 2020-02-20

## 2020-01-21 ASSESSMENT — PAIN SCALES - GENERAL: PAINLEVEL: NO PAIN (0)

## 2020-01-21 ASSESSMENT — PATIENT HEALTH QUESTIONNAIRE - PHQ9: SUM OF ALL RESPONSES TO PHQ QUESTIONS 1-9: 15

## 2020-01-21 NOTE — PATIENT INSTRUCTIONS
You were seen in the ENT clinic today with Dr. Hampton     Recommendations for you:    -Protonix once daily for one month   -Sinus rinses daily for one week   -Nasal Saline Spray twice daily for two weeks: Instill two sprays into both nostrils. You may begin this after one week of sinus rinses   -Follow up with our [facility] for chronic cough/chronic throat clearing      We would like you to follow up in one month      Please call our clinic for any questions, concerns, and/or worsening symptoms.      Clinic #[phone]       Option 1 for scheduling.    Cleaning of the Nasal or Sinus Cavity    Please follow all three steps.  Nasal irrigation (cleaning) should be done one times/day.    Preparation:  1.  Wash your hands  2.  We suggest that you buy the [employer] Sinus Rinse Kit  3.  Use distilled water or tap water that has been boiled and brought to room temperature.  4.  Fill the irrigation bottle with room temperature water (distilled or boiled tap water) and add mixture (pre made packet or homemade recipe).        If you wish to make a homemade recipe:        Mix 1/4 teaspoon salt with 1/4 teaspoon baking soda in each bottle.    Cleansing Irrigation/Sinus Rinse:    1.  Lean forward over a sink and insert the rinse bottle applicator into the right side of your nose.    2.  Tilt head down and to the left side.  With your mouth open (breathing through your mouth), gently direct the water around the inside of your nose until clear fluid starts to drain from the opposite nostril.  This is called flushing or irrigation.    3.  When you have used 1/4 to 1/2 or the solution, switch to the left nostril.    4.  To irrigate the left nostril, tilt your head down and to the right side.  With your mouth open (breathing through your mouth),  gently direct the water around the inside of your nose until clear fluid starts to drain from the opposite nostril.     Cleaning the Equipment:  1.  Throw away any leftover  solution  2.  Clean the rinse bottle and cap with clear water. Air dry.      Call the ENT Clinic if you have any questions or concerns at [phone]    Thank you for allowing us to be apart of your care!    Danna AYERS RNCC    If you need to reach me my direct line is: [phone]    I am out of the office on Thursday's.

## 2020-01-21 NOTE — NURSING NOTE
Chief Complaint   Patient presents with     RECHECK     Phlegm in throat     Blood pressure 121/80, pulse 102, weight 88 kg (194 lb).    Depression Response    Patient completed the PHQ-9 assessment for depression and scored >9? Yes  Question 9 on the PHQ-9 was positive for suicidality? No    I personally notified the following: visit provider            Marla King, EMT

## 2020-01-21 NOTE — PROGRESS NOTES
CC: globus    HPI: Patient returns to clinic today for evaluation of globus sensation.  She feels her globus started approximately a month and with a half ago.  She did have a cold at that time as well.  Cold symptoms improved but she feels like there is a lump in her throat.  She points to just above her sternal notch is where she feels this lump.  It is not painful or sharp but it is uncomfortable and very noted additionally she feels like she has some mucus in her nose.  In the morning she is able to cough up some mucus but during the day the cough that she has is nonproductive.  She also feels like she needs to throat clear.  She is drinking fluids today but thinks that she could trial a drink a little bit more.  She denies any heartburn.  She does not have any significant voice changes or breathing difficulties.    Of note she does report that her snoring is much softer now since her tonsillectomy and UPPP.    PE:  GEN: nad  O/c: Status post tonsillectomy and UPPP.  Tissues are well-healed.  Neck: No palpable neck masses    Flexible fiberoptic laryngoscopy was performed in order to better assess the patient's larynx and posterior nasal cavity space.  Both sides were sprayed with topical Afrin and lidocaine.  I passed the endoscope through the right side.  She does have some clear nasal secretions present particularly posteriorly.  Do see a mild postnasal drip present.  She does have a slightly prominent level tonsils.  Her larynx and supraglottic structures are sharp and clear.  She has bilateral true vocal fold motion that symmetric.  Vocal folds appears slightly swollen.  She does have a mild amount of postcricoid edema present as well.  No masses or lesions are seen in hypopharynx    Assessment and plan:  Patient has a globus sensation that I think is in part due to some irritable larynx with contribution from a postnasal drip.  For her postnasal drip I would like for her to do some sinus rinses once a day  for a week then transition to a nasal saline spray twice a day for also get a place her on a proton pump inhibitor for any possible laryngal pharyngeal reflux.  She will do that for a month as well.  I encouraged her to have good hydration in terms of sipping on fluids throughout the day.  I would like for her to have at least one session with our therapist for some chronic cough and throat clearing/irritable larynx.  See her back in 1 month for recheck.    I spent a total of 25 minutes face-to-face with Tessie Mcdaniels during today's office visit.  Over 50% of this time was spent counseling the patient on and/or coordinating care as documented in my assessment and plan.

## 2020-01-21 NOTE — LETTER
1/21/2020       RE: Tessie Mcdaniels  [address]  [place] [address]     Dear Colleague,    Thank you for referring your patient, Tessie Mcdaniels, to the [place] [facility] NO[facility] THROAT at [facility]. Please see a copy of my visit note below.    CC: globus    HPI: Patient returns to clinic today for evaluation of globus sensation.  She feels her globus started approximately a month and with a half ago.  She did have a cold at that time as well.  Cold symptoms improved but she feels like there is a lump in her throat.  She points to just above her sternal notch is where she feels this lump.  It is not painful or sharp but it is uncomfortable and very noted additionally she feels like she has some mucus in her nose.  In the morning she is able to cough up some mucus but during the day the cough that she has is nonproductive.  She also feels like she needs to throat clear.  She is drinking fluids today but thinks that she could trial a drink a little bit more.  She denies any heartburn.  She does not have any significant voice changes or breathing difficulties.    Of note she does report that her snoring is much softer now since her tonsillectomy and UPPP.    PE:  GEN: nad  O/c: Status post tonsillectomy and UPPP.  Tissues are well-healed.  Neck: No palpable neck masses    Flexible fiberoptic laryngoscopy was performed in order to better assess the patient's larynx and posterior nasal cavity space.  Both sides were sprayed with topical Afrin and lidocaine.  I passed the endoscope through the right side.  She does have some clear nasal secretions present particularly posteriorly.  Do see a mild postnasal drip present.  She does have a slightly prominent level tonsils.  Her larynx and supraglottic structures are sharp and clear.  She has bilateral true vocal fold motion that symmetric.  Vocal folds appears slightly swollen.  She does have a mild amount of postcricoid edema  present as well.  No masses or lesions are seen in hypopharynx    Assessment and plan:  Patient has a globus sensation that I think is in part due to some irritable larynx with contribution from a postnasal drip.  For her postnasal drip I would like for her to do some sinus rinses once a day for a week then transition to a nasal saline spray twice a day for also get a place her on a proton pump inhibitor for any possible laryngal pharyngeal reflux.  She will do that for a month as well.  I encouraged her to have good hydration in terms of sipping on fluids throughout the day.  I would like for her to have at least one session with our therapist for some chronic cough and throat clearing/irritable larynx.  See her back in 1 month for recheck.    I spent a total of 25 minutes face-to-face with Tessiebarbi Mcdaniels during today's office visit.  Over 50% of this time was spent counseling the patient on and/or coordinating care as documented in my assessment and plan.      Again, thank you for allowing me to participate in the care of your patient.      Sincerely,    Evita Hampton MD

## 2020-01-21 NOTE — NURSING NOTE
Patient was consulted regarding PHQ9 score greater than 9. Patient denies interest in referral to [facility]. Patient reports that she has established care elsewhere and is currently being treated for this.     Danna Grant RN

## 2020-01-29 NOTE — TELEPHONE ENCOUNTER
FUTURE VISIT INFORMATION      FUTURE VISIT INFORMATION:    Date: 2/20/20    Time: 10:30 AM    Location: [facility]-ENT  REFERRAL INFORMATION:    Referring provider:  Dr. Hampton    Referring providers clinic:  [facility] - ENT    Reason for visit/diagnosis: Cough and Throat Clearing    RECORDS REQUESTED FROM:       Clinic name Comments Records Status Imaging Status   [facility] 1/21/20 - ENT OV with Dr. Memo Nguyen    [facility] 3/17/19 - ED OV with Dr. Ruff  8/2/17 to 7/12/19 - PULM OVs with Dr. Hopkins [facility] Everywhere    [facility] - Procedures 4/16/19 - Bronchoscopy Scanned In

## 2020-02-20 ENCOUNTER — PRE VISIT (OUTPATIENT)
Dept: OTOLARYNGOLOGY | Facility: CLINIC | Age: 55
End: 2020-02-20

## 2020-06-19 ENCOUNTER — PATIENT OUTREACH (OUTPATIENT)
Dept: OTOLARYNGOLOGY | Facility: CLINIC | Age: 55
End: 2020-06-19

## 2020-06-22 ENCOUNTER — TELEPHONE (OUTPATIENT)
Dept: OTOLARYNGOLOGY | Facility: CLINIC | Age: 55
End: 2020-06-22

## 2020-06-22 NOTE — TELEPHONE ENCOUNTER
Called patient to schedule VIRTUAL or TELEPHONE return appointment with Dr. Hampton, per Danna SINGH.    LVM w/ scheduling instructions and the ENT call center number. Patient is welcome to schedule (RETURN) video or phone visit in any available 'RETURN' slot of Dr. Hampton's.

## 2020-12-28 ENCOUNTER — TELEPHONE (OUTPATIENT)
Dept: OTOLARYNGOLOGY | Facility: CLINIC | Age: 55
End: 2020-12-28

## 2020-12-28 NOTE — TELEPHONE ENCOUNTER
[facility] Call Center    Phone Message    May a detailed message be left on voicemail: yes     Reason for Call: Other: Pt called to schedule her follow up appt with Dr. Venegas that is virtual. I went to schedule it and confirm the Appt but it stated that Dr. venegas is not liscensed in [place] where the Pt is living. I called the back line and they advised me to send a message over so they could speak with Dr. Venegas. Please advise and call the Pt back at [phone]. Thank you!     Action Taken: Message routed to:  [facility] ENT    Travel Screening: Not Applicable

## 2020-12-29 NOTE — TELEPHONE ENCOUNTER
Discussed with Dr. Hampton. Virtual visit ok to be scheduled with patient residing in [place]. Patient scheduled for virtual visit on 12/31. Patient is appreciative of the call and denies any further questions or concerns at this time.     Danna Grant RN

## 2020-12-31 ENCOUNTER — VIRTUAL VISIT (OUTPATIENT)
Dept: OTOLARYNGOLOGY | Facility: CLINIC | Age: 55
End: 2020-12-31
Payer: COMMERCIAL

## 2020-12-31 ENCOUNTER — TELEPHONE (OUTPATIENT)
Dept: OTOLARYNGOLOGY | Facility: CLINIC | Age: 55
End: 2020-12-31

## 2020-12-31 VITALS — WEIGHT: 198 LBS | HEIGHT: 66 IN | BODY MASS INDEX: 31.82 KG/M2

## 2020-12-31 DIAGNOSIS — J38.7 IRRITABLE LARYNX: ICD-10-CM

## 2020-12-31 DIAGNOSIS — R09.82 POST-NASAL DRIP: Primary | ICD-10-CM

## 2020-12-31 DIAGNOSIS — R09.A2 GLOBUS PHARYNGEUS: ICD-10-CM

## 2020-12-31 PROCEDURE — 99212 OFFICE O/P EST SF 10 MIN: CPT | Mod: TEL | Performed by: OTOLARYNGOLOGY

## 2020-12-31 RX ORDER — DULOXETIN HYDROCHLORIDE 60 MG/1
120 CAPSULE, DELAYED RELEASE ORAL
COMMUNITY
Start: 2020-11-12

## 2020-12-31 ASSESSMENT — MIFFLIN-ST. JEOR: SCORE: 1499.87

## 2020-12-31 ASSESSMENT — PAIN SCALES - GENERAL: PAINLEVEL: NO PAIN (0)

## 2020-12-31 NOTE — NURSING NOTE
"Chief Complaint   Patient presents with     RECHECK     Recurrent symptoms, lots of mucus       Height 1.676 m (5' 6\"), weight 89.8 kg (198 lb).    Joy Brar, EMT  "

## 2020-12-31 NOTE — TELEPHONE ENCOUNTER
FUTURE VISIT INFORMATION      FUTURE VISIT INFORMATION:    Date: 3/9/2021    Time: 9 AM    Location: [facility]-ENT  REFERRAL INFORMATION:    Referring provider:  Dr. Evita Hampton    Referring providers clinic:  [facility] - ENT    Reason for visit/diagnosis: Globus Sensation Mucous in Throat    RECORDS REQUESTED FROM:       Clinic name Comments Records Status Imaging Status   [facility] 12/31/20 - ENT OV with Dr. Memo Nguyen    [facility] - Surgery 11/13/19 - OP Note for UVULOPALATOPHARYNGOPLASTY, BILATERAL TONSILECTOMY, AND BILATERAL INFERIOR TURBINOPLASTY with Dr. Memo Nguyen    [facility] 4/9/20 - PULM OVs with Dr. Hopkins  3/17/19 - ED OV with Dr. Ruff Care Everywhere    [facility] - Procedure 4/16/19 - Bronchoscopy Scanned In    [facility] - Imaging 4/8/20 - CT Chest  11/16/19 - XR Chest  3/17/19 - XR Chest  3/11/19 - CT Chest WO  9/17/18 - CT Chest WO  7/23/18 - XR Chest  10/11/17 - CT Chest WO  7/21/17 - CT Chest WO Care Everywhere 12/31 Req - In PACs           * 12/31/20 1:23 PM Faxed req to [place] for images to be pushed to [place] PACs. - [place]  * 1/8/21 8:53 AM Images received from [facility] and attached to patient in PACs. - [place]

## 2020-12-31 NOTE — LETTER
"12/31/2020       RE: Tessie Mcdaniels  [address]  [place] [facility] [address]     Dear Colleague,    Thank you for referring your patient, Tessie Mcdaniels, to the [facility] EAR NOSE AND THROAT CLINIC [place] at [facility]. Please see a copy of my visit note below.    Tessie Mcdaniels is a 55 year old female who is being evaluated via a billable telephone visit.      The patient has been notified of following:     \"This telephone visit will be conducted via a call between you and your physician/provider. We have found that certain health care needs can be provided without the need for a physical exam.  This service lets us provide the care you need with a short phone conversation.  If a prescription is necessary we can send it directly to your pharmacy.  If lab work is needed we can place an order for that and you can then stop by our lab to have the test done at a later time.    Telephone visits are billed at different rates depending on your insurance coverage. During this emergency period, for some insurers they may be billed the same as an in-person visit.  Please reach out to your insurance provider with any questions.    If during the course of the call the physician/provider feels a telephone visit is not appropriate, you will not be charged for this service.\"    Patient has given verbal consent for Telephone visit?  Yes    What phone number would you like to be contacted at? [phone]    How would you like to obtain your AVS? Mail a copy    Phone call duration: 10 minutes    Evita Hampton MD    CC: Mucus in the throat    HPI: Patient continues to have mucus in her throat.  I last saw her in January 21, 2020.  She was having the same symptoms at that time.  She underwent tonsillectomy UPPP as well as bilateral inferior turbinate reductions with me on November 13, 2019.  She continues to have a lot of mucus in her throat especially in the morning she " reports.  She does have to cough it up and she does get thick globs out she reports.  She feels like she can never get rid of it although.  She is better during the day although there are occasional times where she will cough up some secretions that are more white or clear.  They tend to be thinner during the day.  She feels like it is coming from her throat.  She does not really feel any postnasal drip.  We did try her on nasal rinses, saline spray, nasal steroid sprays.  None of which helped.  We also had her take some reflux medications which also did not help.  She denies any difficulty with swallowing.  Her voice is stable.  She feels like her nose is still restricted in terms of breathing.    A/P:  Patient presents with mucus in the throat that is worse in the morning.  Patient for history she probably does not have a significant postnasal drip contributing to this.  I do think it still would be worthwhile getting a CT scan of her sinuses to make absolutely sure that there is not a sinus contribution to this.  She may benefit from an evaluation with one of our laryngologist to see if a second set of eyes might be able to provide her with a better explanation and potential treatment.  We will help arrange this.  Patient would like a CT scan done in [place] and we will try to send her the external referral.        Again, thank you for allowing me to participate in the care of your patient.      Sincerely,    Evita Hampton MD

## 2020-12-31 NOTE — PROGRESS NOTES
"Tessie Mcdaniels is a 55 year old female who is being evaluated via a billable telephone visit.      The patient has been notified of following:     \"This telephone visit will be conducted via a call between you and your physician/provider. We have found that certain health care needs can be provided without the need for a physical exam.  This service lets us provide the care you need with a short phone conversation.  If a prescription is necessary we can send it directly to your pharmacy.  If lab work is needed we can place an order for that and you can then stop by our lab to have the test done at a later time.    Telephone visits are billed at different rates depending on your insurance coverage. During this emergency period, for some insurers they may be billed the same as an in-person visit.  Please reach out to your insurance provider with any questions.    If during the course of the call the physician/provider feels a telephone visit is not appropriate, you will not be charged for this service.\"    Patient has given verbal consent for Telephone visit?  Yes    What phone number would you like to be contacted at? [phone]    How would you like to obtain your AVS? Mail a copy    Phone call duration: 10 minutes    Evita Hampton MD    CC: Mucus in the throat    HPI: Patient continues to have mucus in her throat.  I last saw her in January 21, 2020.  She was having the same symptoms at that time.  She underwent tonsillectomy UPPP as well as bilateral inferior turbinate reductions with me on November 13, 2019.  She continues to have a lot of mucus in her throat especially in the morning she reports.  She does have to cough it up and she does get thick globs out she reports.  She feels like she can never get rid of it although.  She is better during the day although there are occasional times where she will cough up some secretions that are more white or clear.  They tend to be thinner during the day.  She " feels like it is coming from her throat.  She does not really feel any postnasal drip.  We did try her on nasal rinses, saline spray, nasal steroid sprays.  None of which helped.  We also had her take some reflux medications which also did not help.  She denies any difficulty with swallowing.  Her voice is stable.  She feels like her nose is still restricted in terms of breathing.    A/P:  Patient presents with mucus in the throat that is worse in the morning.  Patient for history she probably does not have a significant postnasal drip contributing to this.  I do think it still would be worthwhile getting a CT scan of her sinuses to make absolutely sure that there is not a sinus contribution to this.  She may benefit from an evaluation with one of our laryngologist to see if a second set of eyes might be able to provide her with a better explanation and potential treatment.  We will help arrange this.  Patient would like a CT scan done in [place] and we will try to send her the external referral.

## 2021-01-06 ENCOUNTER — TRANSFERRED RECORDS (OUTPATIENT)
Dept: HEALTH INFORMATION MANAGEMENT | Facility: CLINIC | Age: 56
End: 2021-01-06

## 2021-01-17 NOTE — OR NURSING
Patient has ring on right forth finger. Unable to remove after multiple attempts with lotion, foam, and windex. Anesthestia and OR staff aware. Patient knows risks of keeping it on.   Patient states pain is 5/10 at this time, states pain is tolerable and she doesn't want any medication for the pain at this time. Will continue to monitor.

## 2021-03-09 ENCOUNTER — TELEPHONE (OUTPATIENT)
Dept: OTOLARYNGOLOGY | Facility: CLINIC | Age: 56
End: 2021-03-09

## 2021-03-09 ENCOUNTER — VIRTUAL VISIT (OUTPATIENT)
Dept: OTOLARYNGOLOGY | Facility: CLINIC | Age: 56
End: 2021-03-09
Payer: COMMERCIAL

## 2021-03-09 ENCOUNTER — PRE VISIT (OUTPATIENT)
Dept: OTOLARYNGOLOGY | Facility: CLINIC | Age: 56
End: 2021-03-09

## 2021-03-09 VITALS — HEIGHT: 66 IN | WEIGHT: 190 LBS | BODY MASS INDEX: 30.53 KG/M2

## 2021-03-09 DIAGNOSIS — K21.9 LPRD (LARYNGOPHARYNGEAL REFLUX DISEASE): Primary | ICD-10-CM

## 2021-03-09 DIAGNOSIS — R13.12 OROPHARYNGEAL DYSPHAGIA: ICD-10-CM

## 2021-03-09 PROCEDURE — 99215 OFFICE O/P EST HI 40 MIN: CPT | Mod: 95 | Performed by: OTOLARYNGOLOGY

## 2021-03-09 ASSESSMENT — PATIENT HEALTH QUESTIONNAIRE - PHQ9: SUM OF ALL RESPONSES TO PHQ QUESTIONS 1-9: 14

## 2021-03-09 ASSESSMENT — MIFFLIN-ST. JEOR: SCORE: 1463.58

## 2021-03-09 ASSESSMENT — PAIN SCALES - GENERAL: PAINLEVEL: SEVERE PAIN (6)

## 2021-03-09 NOTE — Clinical Note
Mu Xiong  Can you try to get the CT sinus images from last week from [facility]?  Thank you  Wen

## 2021-03-09 NOTE — TELEPHONE ENCOUNTER
MARIMAR to schedule video swallow and Dr. Salazar return visit same day in afternoon. Looking at 4/22. Gave [place] rehab number and ENT [facility] number

## 2021-03-09 NOTE — LETTER
3/9/2021       RE: Tessie Mcdaniels  [address]  [facility] [address]     Dear Colleague,    Thank you for referring your patient, Tessie Mcdaniels, to the [facility] [place] at [facility]. Please see a copy of my visit note below.    Tessie is a 55 year old who is being evaluated via a billable video visit.      How would you like to obtain your AVS? MyChart  If the video visit is dropped, the invitation should be resent by: Send to e-mail at: [email or web address]  Will anyone else be joining your video visit? No      Video Start Time: 9:59  Video-Visit Details    Type of service:  Video Visit    Video End Time:10:25    Originating Location (pt. Location): Home    Distant Location (provider location):  [facility] [place]     Platform [facility] for [facility]   at the [facility]   Otolaryngology Clinic     Patient: Tessie Mcdaniels    MRN: [id number]    : 1965    Age/Gender: 55 year old female  Date of Service: 3/9/2021  Rendering Provider:   Wen Salazar MD     Referring Provider   PCP: Jammie Ramos  Referring Physician: Evita Hampton MD  [address] [place] [id number]  Reason for Consultation   Phlegm, globus sensation  History   HISTORY OF PRESENT ILLNESS: I was asked to consult on Tessie Mcdaniels, by Dr Shalini Hampton for evaluation of phlegm . Ms. Mcdaniels is a 55 year old female who presents to us today with phlegm/globus sensation for 1.5 years.      Of note, she has history of UPPP and bilateral inferior turbinate reduction 19.     Today, she, presents for evaluation. she reports:    - Feels like there is a lump of phlegm in her throat all the time  - Especially when she wakes up  - She feels like she has to hack   - It is yellowing and green  - If she can get any of the thick stuff  Patient: Valerie Davila    Procedure Summary     Date:  11/08/18 Room / Location:  [facility] ENDOSCOPY 2 / [facility] ENDOSCOPY    Anesthesia Start:  0741 Anesthesia Stop:  0808    Procedure:  ESOPHAGOGASTRODUODENOSCOPY WITH BIOPSY (N/A Esophagus) Diagnosis:       Esophageal ring      Hiatal hernia      Erosive gastritis      Duodenitis      (Dysphagia, unspecified type [R13.10])    Surgeon:  Yunior Acosta MD Provider:  You Medrano CRNA    Anesthesia Type:  MAC ASA Status:  3          Anesthesia Type: MAC  Last vitals  BP   151/74 (11/08/18 0915)   Temp   96.9 °F (36.1 °C) (11/08/18 0815)   Pulse   82 (11/08/18 0915)   Resp   17 (11/08/18 0915)     SpO2   99 % (11/08/18 0915)     Post Anesthesia Care and Evaluation    Patient location during evaluation: bedside  Patient participation: complete - patient participated  Level of consciousness: awake  Pain score: 0  Pain management: adequate  Airway patency: patent  Anesthetic complications: No anesthetic complications  PONV Status: controlled  Cardiovascular status: acceptable and stable  Respiratory status: acceptable and room air  Hydration status: acceptable       out  - Sometimes she can t get it out  - It s been going for a 1 year  - If she sits and doesn t swallow food - when she swallows- she notices something scary - like she can t really swallow  - If she is eating or drinking something it always goes down  - She would like it to break up and be gone    Lump phlegm   - Feels it now  - Worse in the morning  - Doesn t every go away completely  - After she eats is better  - Talking doesn t really affect it  - Physical activity - she has shortness of breath - she has bad anxiety - she starts to get panicky   - If she gets busy she forgets about it   - Nothing makes it better  - Had a sinus rinse bottle - was pretty consistent - tried flonase - treid ocean spray - didn t help   - Tried Mucinex on her own - didn t help   - It was a rough surgery - she had a reaction to anesthesia - stayed in the hospital for 2 days  - She had a lot of throwing up- violently   - Then she got a cold   - After the cold this sensation developed and never went away    Cough/throat clear  - Cough a little bit - feels a bit dip  - In the morning   - she had a chronic cough for 1 year - had a lavage in 2019 bronchoscopy       Dysphonia  - denies    Dysphagia  - swallowing food, liquid  - feels like she cant swallow saliva at times  - if she notices her throat get dry  - she feels like she has to swallow past something    Dyspnea  - only her heart rate goes up  - she feels like she is gasping  - it makes it difficult to catch air    GERD  - feels heartburn/reflux  - taking omeprazole  - never had an endoscopy   - no stomach doctor     LPRD  - Right away in the morning she feels like she has a really bad cold  - Only some little bits of it come out  Doesn t wake up with a sore throat     PAST MEDICAL HISTORY:   Past Medical History:   Diagnosis Date     Sleep apnea        PAST SURGICAL HISTORY:   Past Surgical History:   Procedure Laterality Date     TONSILLECTOMY ADULT Bilateral 11/13/2019     Procedure: Bilateral Tonsillectomy;  Surgeon: Evita Hampton MD;  Location: [facility] OR     TURBINECTOMY Bilateral 11/13/2019    Procedure: Turbinectomy;  Surgeon: Evita Hampton MD;  Location: [facility] OR     UVULOPALATOPHARYNGOPLASTY N/A 11/13/2019    Procedure: Uvulopalatopharyngoplasty, bilateral tonsilectomy and bilateral inferior turbinoplasty;  Surgeon: Evita Hampton MD;  Location: [facility] OR       CURRENT MEDICATIONS:   Current Outpatient Medications:      albuterol (PROVENTIL HFA) 108 (90 Base) MCG/ACT inhaler, Inhale 2 puffs into the lungs, Disp: , Rfl:      ALPRAZolam (XANAX PO), Take 1 mg by mouth as needed for anxiety , Disp: , Rfl:      DULoxetine (CYMBALTA) 60 MG capsule, Take 120 mg by mouth, Disp: , Rfl:      estradiol (ESTRACE) 0.1 MG/GM vaginal cream, Place 1 g vaginally, Disp: , Rfl:      FLUoxetine HCl (PROZAC PO), Take 20 mg by mouth daily , Disp: , Rfl:      fluticasone (FLONASE) 50 MCG/ACT nasal spray, Spray 1 spray in nostril, Disp: , Rfl:      lamoTRIgine (LAMICTAL) 25 MG chewable tablet, Take 2 tablets (50 mg) by mouth daily for 14 days, Disp: 28 tablet, Rfl: 0     meloxicam (MOBIC) 15 MG tablet, Take 1 tablet (15 mg) by mouth daily (Patient not taking: Reported on 12/31/2020), Disp: 10 tablet, Rfl: 0     meloxicam (MOBIC) 7.5 MG tablet, Take 7.5 mg by mouth daily, Disp: , Rfl:      pravastatin (PRAVACHOL) 40 MG tablet, , Disp: , Rfl: 1     predniSONE (DELTASONE) 20 MG tablet, Take 1 tablet (20 mg) by mouth daily Start on the third day after surgery (Patient not taking: Reported on 12/31/2020), Disp: 5 tablet, Rfl: 0     traMADol (ULTRAM) 50 MG tablet, Take 50 mg by mouth, Disp: , Rfl:      TRAZODONE HCL PO, Take 100 mg by mouth At Bedtime , Disp: , Rfl:      triamcinolone (KENALOG) 0.1 % external cream, , Disp: , Rfl:     ALLERGIES: Diflucan [fluconazole] and Sulfa drugs    SOCIAL HISTORY:    Social History     Socioeconomic History     Marital status:      Spouse  name: Not on file     Number of children: Not on file     Years of education: Not on file     Highest education level: Not on file   Occupational History     Not on file   Social Needs     Financial resource strain: Not on file     Food insecurity     Worry: Not on file     Inability: Not on file     Transportation needs     Medical: Not on file     Non-medical: Not on file   Tobacco Use     Smoking status: Former Smoker     Packs/day: 0.00     Smokeless tobacco: Never Used     Tobacco comment: quit 5 years ago   Substance and Sexual Activity     Alcohol use: Never     Frequency: Never     Drug use: Not on file     Sexual activity: Not on file   Lifestyle     Physical activity     Days per week: Not on file     Minutes per session: Not on file     Stress: Not on file   Relationships     Social connections     Talks on phone: Not on file     Gets together: Not on file     Attends Islam service: Not on file     Active member of club or organization: Not on file     Attends meetings of clubs or organizations: Not on file     Relationship status: Not on file     Intimate partner violence     Fear of current or ex partner: Not on file     Emotionally abused: Not on file     Physically abused: Not on file     Forced sexual activity: Not on file   Other Topics Concern     Not on file   Social History Narrative     Not on file         FAMILY HISTORY: No family history on file.  Non-contributory for problems with anesthesia    REVIEW OF SYSTEMS:   The patient was asked a 14 point review of systems regarding constitutional symptoms, eye symptoms, ears, nose, mouth, throat symptoms, cardiovascular symptoms, respiratory symptoms, gastrointestinal symptoms, genitourinary symptoms, musculoskeletal symptoms, integumentary symptoms, neurological symptoms, psychiatric symptoms, endocrine symptoms, hematologic/lymphatic symptoms, and allergic/ immunologic symptoms.   The pertinent factors have been included in the HPI and  below.  Patient Supplied Answers to Review of Systems   ENT ROS 11/26/2019   Constitutional Problems with sleep   Neurology Dizzy spells, Headache   Psychology Frequently feeling depressed or sad, Frequently feeling anxious   Eyes Visual loss   Ears, Nose, Throat Hearing loss, Ear pain, Ringing/noise in ears, Nasal congestion or drainage, Sore throat, Trouble swallowing, Hoarseness   Cardiopulmonary -   Gastrointestinal/Genitourinary Heartburn/indigestion, Constipation, Diarrhea   Musculoskeletal Back pain   Allergy/Immunology -   Endocrine Frequent urination   Other Problems with anesthesia in surgery       Physical Examination   The patient underwent a physical examination as described below. The pertinent positive and negative findings are summarized after the description of the examination.    Constitutional: The patient's developmental and nutritional status was assessed. The patient's voice quality was assessed.  Head and Face: The head and face were inspected for deformities. Facial muscle strength was assessed bilaterally.  Eyes: Extraocular movements and primary gaze alignment were assessed.  Ears, Nose, Mouth and Throat: The ears and nose were examined for deformities.The lips were examined for abnormalities.   Neck: The neck was visualized for abnormal neck masses  Respiratory: The nature of the breathing was observed.  Extremities: The hand extremities were examined for any clubbing or cyanosis.  Skin: The skin was examined for inflammatory or neoplastic conditions.  Neurologic: The patient's orientation, mood, and affect were noted. The cranial nerve  functions were examined.  Other pertinent positive and negative findings on physical examination:   Breathing comfortably on room air, no stridor with deep inspiration  no throat clearing throughout the visit     All other physical examination findings were within normal limits and noncontributory     Review of Relevant Clinical Data   Notes: Memo  Evita 18  Radiology: CT chest  2020      Procedures: 19 - bronchoscopy - mucosa was erythematous  Labs:  No results found for: TSH  No results found for: NA, CO2, BUN, CREAT, GLUCOSE, PHOS  No results found for: WBC, HGB, HCT, MCV, PLT  No results found for: PT, PTT, INR  No results found for: LALO  No components found for: RHEUMATOIDFACTOR,  RF  No results found for: CRP  No components found for: CKTOT, URICACID  No components found for: C3, C4, DSDNAAB, NDNAABIFA  No results found for: MPOAB    Patient reported Quality of Life (QOL) Measures   Patient Supplied Answers To VHI Questionnaire  No flowsheet data found.      Patient Supplied Answers To EAT Questionnaire  No flowsheet data found.      Patient Supplied Answers To CSI Questionnaire  No flowsheet data found.      Patient Supplied Answers to Dyspnea Index Questionnaire:  No flowsheet data found.    Impression & Plan     IMPRESSION: Ms. Mcdaniels is a 55 year old female who is being seen for the followin. Lump of phlegm  - after her UPPP/turb surgery on 2019  - complicated by vomiting post-o and URI   - symptoms started after that  - worse in the morning  - better with food  - not affected by talking or breathing  - coughing in the morning - to clear the phlegm  - did have a dry cough in  that resolved - had a bronchoscopy in 2019 with mild mucosal irritation and question of bacterial infection on cultures  - CT chest 2020- shows patent subglottis  - does have GERD/LPRD symptoms on omeprazole, does not have a GI  - discussed symptoms are mulitfactorial - from irritable larynx syndrome and laryngopharyngeal reflux, need to rule out lesion and esophageal etiology  Plan  - scope with voice SLP  - start salt water gargles  - start laryngopharyngeal reflux precautions  - Xray Video Swallow Exam with esophagram  - gastroenterology referral   - obtain CT sinus    RETURN VISIT: office     Thank you for the kind referral and for  allowing me to share in the care of Ms. Mcdaniels. If you have any questions, please do not hesitate to contact me.    Wen Salazar MD    Laryngology    [facility]  Department of  Otolaryngology - Head and Neck Surgery  [facility] [place] [address]  Appointment line: [phone]  Fax: [phone]    40 minutes spent on the date of the encounter doing chart review, interpretation of tests, patient visit and documentation

## 2021-03-09 NOTE — PATIENT INSTRUCTIONS
1.  You were seen in the ENT Clinic today by . If you have any questions or concerns after your appointment, please call [phone]. Press option #1 for scheduling related needs. Press option #3 for Nurse advice.    2.   has recommended  the following:    The following are for you to review  - salt water gargles  - laryngopharyngeal reflux  - irritable larynx syndrome  - I will enter the referral for gastroenterology at the [facility] but agreed if there is someone near home you can see that is perfect.    Recipe: For gargle:   -6-8 oz glass   -warm water   -Distilled   -feel for right temperature (not too hot or cold)   -warm enough to dissolve the salt   -  tsp. kosher salt, or sea salt   -Additives in table salt can cause irritation/ discomfort.   -  tsp. baking soda     Gargle:   Using Saline Rinse or plain water     morning and night, as needed throughout the day     Great strategy to avoid throat clearing     Small sips!     Gargle with a voice     Gargle with a voice and tilt your side to side       ---    Laryngopharyngeal Reflux Disorder (LPRD)    Reflux is a term that describes stomach contents that back up above the stomach. The stomach contents range depending on what is in the stomach at the time of this happening from very acidic to non-acidic. When the stomach contents backup happens to the level of the esophagus - this can cause indigestion, stomach upset, pain, heartburn, regurgitation. This is known as Gastroesophageal Reflux Disorder (GERD). When the stomach contents back up even higher to your throat - to the level of the entrance of your esophagus and your voice box or larynx - this is called extraesophageal reflux - atypical reflux or laryngopharyngeal reflux and can cause what is known as Laryngopharyngeal Reflux Disorder (LPRD).     Gastroesophageal Reflux Disorder (GERD) is a well-known problem in this [place], with advertisements for the many medications used to treat it  commonly seen on TV.  GERD occurs when stomach acid makes its way back up the esophagus, causing indigestion, stomach upset, and, especially, heartburn.  The acid may travel all the way up the esophagus, and spill over onto the larynx.  This is especially common during sleep, when the individual is lying down, and acid does not have to fight gravity to move up the esophagus.  When symptoms of acid reflux are more apparent in the larynx or pharynx, then the disorder may be called Laryngopharyngeal Reflux Disorder (LPRD).    Some of the symptoms include:      a dry, choking sensation, especially during the night    voice quality that is worst in the morning    a raw, burning sensation in the throat    pain in the throat, neck, or running from the back of the chin along the neck    frequent coughing, or desire to clear the throat, or mucus sensation    a rough, gritty voice quality    decline in voice quality, or comfort, with continued voice use    a sour taste in your mouth upon waking up    a lump in the throat or globus sensation     postnasal drip, without other nasal and allergic symptoms     sensation of difficulty inhaling - like paradoxical vocal fold motion or laryngospasm     Interestingly, many of the patients in the [facility] who have laryngeal symptoms (LPRD) do not have classic symptoms of GERD, such as stomach discomfort and heartburn.    Why does LPRD cause throat symptoms:   In the case of LPRD, when the stomach content spills over onto the larynx, it irritates the lining of the throat and the vocal folds and creates inflammation, which causes the vocal folds to vibrate unevenly.  Coughing and throat-clearing from the irritation can make the inflammation worse.  The resulting voice disorder is often related to the poor vibratory quality of the inflamed vocal folds and the muscle tension created by effortful attempts at compensation.    Anti-reflux medication and dietary precautions are the  first line of treatment for GERD/LPRD.  Discuss medications with your doctor.  Some medications may have side effects if taken for a long time, and it will be important for you to consider these.  Functional voice therapy is useful to teach techniques for reducing effortful compensation and instruct the individual in improved laryngeal hygiene.    At the [facility], we do not hand out a list of foods and beverages that must be avoided.  Rather, we educate about types of foods and beverages known to cause reflux, and we will encourage you to systematically investigate which foods stimulate your own reflux.  See the back of this page for dietary and lifestyle precautions for GERD/LPRD.  Also, you are encouraged to manage reflux under the care of a gastrointestinal specialist.    Lifestyle changes that may help reduce symptoms of GERD/LPRD    eat smaller meals more frequently throughout the day, rather than three large meals    elevate the head of your bed 2-3 inches (don't just use extra pillows for your head)    avoid clothes that fit tightly around the waist    avoid exercising or lying down within 2-3 hours of eating (don't eat dinner late at night)  Types of foods known to trigger increased stomach acid    spicy food (such as chili or jalapeño peppers, Parth or Szechuan spices)    acidic foods such as tomato products or citrus products    greasy foods    caffeine    alcohol    carbonation    roughage, such as popcorn and peanuts, or raw vegetables    dairy products     strong mint such as peppermint candies    chocolate    IMPORTANT NOTE!  Before you read the information in the box and think you'll only be able to eat bread and oatmeal for the rest of your life, remember that reflux varies person to person.  One person may not have problems with coffee but is bothered by red wine, while another person may not be affected by red wine but can't have tomato sauce.  If you choose to restrict with your diet, try  changing things one group at a time to see what makes the difference for you.      Regarding the use of anti-reflux medications, one of the most common varieties is called a Proton Pump Inhibitor or PPI.  This class of medication includes brand name drugs like Prilosec and its generic form Omeprazole.  For most PPI to get the best benefit from this type of medication it should be taken on an empty stomach 30-60 minutes before you eat.  As with any medication, speak to your pharmacist. Y if you want to be sure you are taking it correctly, and that it does not interact with any of your home medications - especially antiplatelet medication (like plavix) or thyroid replacement medication (like synthroid). ( speak to your pharmacist)       ----  List of Food Items for the Low-Acid Diet:  Agave  Aloe vera   Apple  Artifical sweetener (max 2 tsp per day)  Avocado   Bagels and (non-fruit) low-fat muffins  Banana  Beans (black, red, lima, lentils, etc)  Bread (especially whole grain and rye)  Caramel (maximum 4 tablespoons per work)  Celery  Chamomile tea (most other herbal teas are not acceptable)  Chicken (grilled, broiled, baked, or steamed; no skin)  Chicken stock or bouillon Coffee (max 1 cup per day; best with milk)  Egg whites  Fennel  Fish (grilled, broiled, baked, or steamed)  Salina (salina root, powdered, or preserved)  Jeffy crackers  Herbs (excluding all peppers, citrus, garlic, and mustard)  Honey  Melon (honeydew, cantaloupe, watermelon)  Mushrooms (raw or cooked)  Oatmeal (all whole-grain cereals)  Olive oil (maximum 2 tablespoons per day)  Parsley  Pasta (with nonacidic sauce)  Popcorn (plain or salted, no butter)  Potatoes (all of the root vegetables except onions)  Rice (healthy, especially brown rice, a staple during induction)  Skim milk (soy or Lactaid skim milk)  Soups (great homemade with noodles and vegetables)  Tofu  Turkey breast (organic, no skin)  Turnip  Vegetables (raw or cooked, but no onion,  tomato, or peppers)  Vinaigrette (maximum 1 tablespoon per day; toss salads)  Whole-grain breads, crackers, and breakfast cereals        -----                                                                                                                                                                                                                                     Irritable Larynx Syndrome    What is Irritable Larynx Syndrome?  Irritable Larynx Syndrome (ILS) is a cluster of symptoms not associated with a specific disease process. Individuals with ILS can have any combination of the following complaints:      ; Globus sensation (feeling of lump or some other sensation in the throat)  ; Throat irritation or burning sensation  ; Tightness of throat or neck  ; Chronic cough or throat clearing; sensation of need to clear throat  ; Effort or pain with swallowing  ; Paradoxical vocal fold motion (sensation of difficulty inhaling)  ; Laryngospasm (tightening of throat causing choking or difficulty inhaling)    What causes ILS?  ILS works in the same way as a mosquito bite: We might scratch the bite absentmindedly a couple of times, and suddenly we realize the bite really itches!  So we scratch it vigorously because that feels better for a few moments. In the long run though, scratching actually makes the itch worse.  In the same way, when individuals experience mild irritation in their throats for some reason, they might not realize that they've begun  scratching  the  itch,  (throat clearing) but over time the irritation worsens and becomes more noticeable.  This is how earlier, milder symptoms can be the precursors to more-severe symptoms. Chronic irritation of the mucosa in the laryngeal area can cause changes to the nerve pathways; they can become hyper-excitable, so that it only takes a small irritation to have a large sensory response (like a cough).  It's nice that the nervous system can learn, but in this case  it has backfired!    Here are some possible irritants that can start the chain reaction (most individuals have more than one):  ; Upper respiratory infection with cough  ; Acid Reflux  ; Post Nasal Drainage  ; Allergens (e.g. tree, mold, pollen, pet dander)  ; Cigarette smoke or other kinds of smoke  ; Odors (e.g. perfume, hairspray)  ; Food sensitivities  ; Strong Emotions (e.g. anxiety, stress)  ; Hyperfunction of the muscles of the vocal mechanism  ; Harsh chemicals/  ; Cold Air    Evaluation of ILS  At the [facility], an otolaryngologist and a speech-language pathologist work as a team to determine if ILS is a problem.  Medical evaluation and laryngeal examination rule out any other cause for the symptoms.  Some medical treatment may be advised.  Functional evaluation determines whether there are behavioral or lifestyle factors that are contributing to the symptoms.      Treatment of ILS  Treatment of ILS addresses the cause of irritation. This can include:   ; Treatment of Acid Reflux This may include: Medications (what your MD prescribes), Dietary Precautions (what you eat and what supplements you take), Lifestyle Precautions (when you eat, avoiding environmental irritants), and Mechanical Precautions (how much pressure you put on your lower esophageal sphincter). Please review the laryngopharyngeal reflux hand out.   ; Functional Speech Therapy with a Speech-Language Pathologist (SLP). Your SLP will educate you about the disorder, help you improve the environment of your mucosa to reduce irritation, improve the movement and function of your larynx, and help reduce muscle tension and restore muscle balance.  ; Further Medical treatment is sometimes used to restore the medical basis for normal function and sensation.  Your MD will discuss these possibilities with you if they are relevant.      Cough and Throat Clearing  The biological purpose of the larynx is to protect the airway (trachea and  lungs). Coughing and throat clearing are mechanisms that are meant to assist in the protection of our airway. When we feel something such as liquid, food, saliva, dust, etc. near our vocal folds, we will clear our throats and cough as a protective reflex. We also cough or throat clear if our airway is irritated.     Why can chronic coughing and throat clearing be harmful?  A cough is produced by squeezing the vocal folds together, building up pressure in the lungs, and then quickly forcing the vocal folds open to clear away whatever might be getting too close to our airway. This can be traumatic to the vocal folds, irritating them in the same way that our hands would be irritated if they were being slapped together over and over. Coughing for a long period of time can cause the mucosa of the larynx and vocal folds to become hypersensitive, making it feel like something is threatening the airway.  The vocal folds need to cough or throat clear even when there isn't an actual physical threat to the airway.  The chronic coughing or throat clearing results in even more coughing or throat clearing.      Strategies to Reduce Irritation  Your speech-language pathologist will teach you techniques to use muscles optimally, in order to reduce irritation.  In the meantime, you can start reducing the irritation, using the following strategies:    ; Identifying your unique trigger; take steps to avoid it  ; Improve both systemic and topical (surface) hydration (dry mucosa is more easily irritated)  ; Drink adequate fluids   ; Use a humidifier, especially in the bedroom at night  ; Guaifenesin (e.g. Mucinex) to thin viscous secretions  ; Sucking on candy, or cough drops without menthol, or chew gum, to increase salivary stimulation  ; Alternative Behaviors to coughing  ; Swallow hard  ; Stimulate your oral cavity:   ; Sip hot, cold, carbonated, or flavored water  ; Suck on ice chips  ; Bite your tongue or cheek (not too  hard!)  ; Massage under your chin  ; Gargle  ; Gentle hums or vocal exercises taught to you by your SLP  ; Say  eh eh eh eh  silently (for a gentle, non-irritating throat-clear)  ; Sniff or pursed lip inhale and exhale on  Shhh  like shushing a baby  ; Sniff or pursed lip inhale and exhale on  zzz  like a buzzing bee  ; Wait. While you are waiting, try the above behaviors instead    3.  Plan is to return to clinic for a video swallow and esophagram, followed by an in clinic visit with . our clinic coordinators will reach out to you to schedule.      Nelsy Sosa LPN  [phone]  [facility] - Otolaryngology

## 2021-03-09 NOTE — NURSING NOTE
"Chief Complaint   Patient presents with     Consult     Globus sensation         Height 1.676 m (5' 6\"), weight 86.2 kg (190 lb).    Depression Response    Patient completed the PHQ-9 assessment for depression and scored >9? Yes  Question 9 on the PHQ-9 was positive for suicidality? No  Does patient have current mental health provider? Yes    Is this a virtual visit? Yes   Does patient have suicidal ideation (positive question 9)? No - offer to place Mental Health Referral.  Patient declined referral/not needed    I personally notified the following: visit provider      Marla King, EMT    "

## 2021-03-09 NOTE — PROGRESS NOTES
Tessie is a 55 year old who is being evaluated via a billable video visit.      How would you like to obtain your AVS? MyChart  If the video visit is dropped, the invitation should be resent by: Send to e-mail at: [email or web address]  Will anyone else be joining your video visit? No      Video Start Time: 9:59  Video-Visit Details    Type of service:  Video Visit    Video End Time:10:25    Originating Location (pt. Location): Home    Distant Location (provider location):  [facility] EAR NOSE AND THROAT [facility]     Platform used for Video Visit: [facility]   at the [facility]   Otolaryngology Clinic     Patient: Tessie Mcdaniels    MRN: [id number]    : 1965    Age/Gender: 55 year old female  Date of Service: 3/9/2021  Rendering Provider:   Wen Salazar MD     Referring Provider   PCP: Jammie Ramos  Referring Physician: Evita Hampton MD  [address]  [place] [address]  Reason for Consultation   Phlegm, globus sensation  History   HISTORY OF PRESENT ILLNESS: I was asked to consult on Tessie Mcdaniels, by Dr Shalini Hampton for evaluation of phlegm . Ms. Mcdaniels is a 55 year old female who presents to us today with phlegm/globus sensation for 1.5 years.      Of note, she has history of UPPP and bilateral inferior turbinate reduction 19.     Today, she, presents for evaluation. she reports:    - Feels like there is a lump of phlegm in her throat all the time  - Especially when she wakes up  - She feels like she has to hack   - It is yellowing and green  - If she can get any of the thick stuff out  - Sometimes she can t get it out  - It s been going for a 1 year  - If she sits and doesn t swallow food - when she swallows- she notices something scary - like she can t really swallow  - If she is eating or drinking something it always goes down  - She would like it to break up and be gone    Lump phlegm   - Feels it now  - Worse  in the morning  - Doesn t every go away completely  - After she eats is better  - Talking doesn t really affect it  - Physical activity - she has shortness of breath - she has bad anxiety - she starts to get panicky   - If she gets busy she forgets about it   - Nothing makes it better  - Had a sinus rinse bottle - was pretty consistent - tried flonase - treid ocean spray - didn t help   - Tried Mucinex on her own - didn t help   - It was a rough surgery - she had a reaction to anesthesia - stayed in the hospital for 2 days  - She had a lot of throwing up- violently   - Then she got a cold   - After the cold this sensation developed and never went away    Cough/throat clear  - Cough a little bit - feels a bit dip  - In the morning   - she had a chronic cough for 1 year - had a lavage in 2019 bronchoscopy       Dysphonia  - denies    Dysphagia  - swallowing food, liquid  - feels like she cant swallow saliva at times  - if she notices her throat get dry  - she feels like she has to swallow past something    Dyspnea  - only her heart rate goes up  - she feels like she is gasping  - it makes it difficult to catch air    GERD  - feels heartburn/reflux  - taking omeprazole  - never had an endoscopy   - no stomach doctor     LPRD  - Right away in the morning she feels like she has a really bad cold  - Only some little bits of it come out  Doesn t wake up with a sore throat     PAST MEDICAL HISTORY:   Past Medical History:   Diagnosis Date     Sleep apnea        PAST SURGICAL HISTORY:   Past Surgical History:   Procedure Laterality Date     TONSILLECTOMY ADULT Bilateral 11/13/2019    Procedure: Bilateral Tonsillectomy;  Surgeon: Evita Hampton MD;  Location: [facility] OR     TURBINECTOMY Bilateral 11/13/2019    Procedure: Turbinectomy;  Surgeon: Evita Hampton MD;  Location: [facility] OR     UVULOPALATOPHARYNGOPLASTY N/A 11/13/2019    Procedure: Uvulopalatopharyngoplasty, bilateral tonsilectomy and bilateral inferior  turbinoplasty;  Surgeon: Evita Hampton MD;  Location: [facility] OR       CURRENT MEDICATIONS:   Current Outpatient Medications:      albuterol (PROVENTIL HFA) 108 (90 Base) MCG/ACT inhaler, Inhale 2 puffs into the lungs, Disp: , Rfl:      ALPRAZolam (XANAX PO), Take 1 mg by mouth as needed for anxiety , Disp: , Rfl:      DULoxetine (CYMBALTA) 60 MG capsule, Take 120 mg by mouth, Disp: , Rfl:      estradiol (ESTRACE) 0.1 MG/GM vaginal cream, Place 1 g vaginally, Disp: , Rfl:      FLUoxetine HCl (PROZAC PO), Take 20 mg by mouth daily , Disp: , Rfl:      fluticasone (FLONASE) 50 MCG/ACT nasal spray, Spray 1 spray in nostril, Disp: , Rfl:      lamoTRIgine (LAMICTAL) 25 MG chewable tablet, Take 2 tablets (50 mg) by mouth daily for 14 days, Disp: 28 tablet, Rfl: 0     meloxicam (MOBIC) 15 MG tablet, Take 1 tablet (15 mg) by mouth daily (Patient not taking: Reported on 12/31/2020), Disp: 10 tablet, Rfl: 0     meloxicam (MOBIC) 7.5 MG tablet, Take 7.5 mg by mouth daily, Disp: , Rfl:      pravastatin (PRAVACHOL) 40 MG tablet, , Disp: , Rfl: 1     predniSONE (DELTASONE) 20 MG tablet, Take 1 tablet (20 mg) by mouth daily Start on the third day after surgery (Patient not taking: Reported on 12/31/2020), Disp: 5 tablet, Rfl: 0     traMADol (ULTRAM) 50 MG tablet, Take 50 mg by mouth, Disp: , Rfl:      TRAZODONE HCL PO, Take 100 mg by mouth At Bedtime , Disp: , Rfl:      triamcinolone (KENALOG) 0.1 % external cream, , Disp: , Rfl:     ALLERGIES: Diflucan [fluconazole] and Sulfa drugs    SOCIAL HISTORY:    Social History     Socioeconomic History     Marital status:      Spouse name: Not on file     Number of children: Not on file     Years of education: Not on file     Highest education level: Not on file   Occupational History     Not on file   Social Needs     Financial resource strain: Not on file     Food insecurity     Worry: Not on file     Inability: Not on file     Transportation needs     Medical: Not on file      Non-medical: Not on file   Tobacco Use     Smoking status: Former Smoker     Packs/day: 0.00     Smokeless tobacco: Never Used     Tobacco comment: quit 5 years ago   Substance and Sexual Activity     Alcohol use: Never     Frequency: Never     Drug use: Not on file     Sexual activity: Not on file   Lifestyle     Physical activity     Days per week: Not on file     Minutes per session: Not on file     Stress: Not on file   Relationships     Social connections     Talks on phone: Not on file     Gets together: Not on file     Attends Confucianist service: Not on file     Active member of club or organization: Not on file     Attends meetings of clubs or organizations: Not on file     Relationship status: Not on file     Intimate partner violence     Fear of current or ex partner: Not on file     Emotionally abused: Not on file     Physically abused: Not on file     Forced sexual activity: Not on file   Other Topics Concern     Not on file   Social History Narrative     Not on file         FAMILY HISTORY: No family history on file.  Non-contributory for problems with anesthesia    REVIEW OF SYSTEMS:   The patient was asked a 14 point review of systems regarding constitutional symptoms, eye symptoms, ears, nose, mouth, throat symptoms, cardiovascular symptoms, respiratory symptoms, gastrointestinal symptoms, genitourinary symptoms, musculoskeletal symptoms, integumentary symptoms, neurological symptoms, psychiatric symptoms, endocrine symptoms, hematologic/lymphatic symptoms, and allergic/ immunologic symptoms.   The pertinent factors have been included in the HPI and below.  Patient Supplied Answers to Review of Systems  [facility] ROS 11/26/2019   Constitutional Problems with sleep   Neurology Dizzy spells, Headache   Psychology Frequently feeling depressed or sad, Frequently feeling anxious   Eyes Visual loss   Ears, Nose, Throat Hearing loss, Ear pain, Ringing/noise in ears, Nasal congestion or drainage, Sore throat,  Trouble swallowing, Hoarseness   Cardiopulmonary -   Gastrointestinal/Genitourinary Heartburn/indigestion, Constipation, Diarrhea   Musculoskeletal Back pain   Allergy/Immunology -   Endocrine Frequent urination   Other Problems with anesthesia in surgery       Physical Examination   The patient underwent a physical examination as described below. The pertinent positive and negative findings are summarized after the description of the examination.    Constitutional: The patient's developmental and nutritional status was assessed. The patient's voice quality was assessed.  Head and Face: The head and face were inspected for deformities. Facial muscle strength was assessed bilaterally.  Eyes: Extraocular movements and primary gaze alignment were assessed.  Ears, Nose, Mouth and Throat: The ears and nose were examined for deformities.The lips were examined for abnormalities.   Neck: The neck was visualized for abnormal neck masses  Respiratory: The nature of the breathing was observed.  Extremities: The hand extremities were examined for any clubbing or cyanosis.  Skin: The skin was examined for inflammatory or neoplastic conditions.  Neurologic: The patient's orientation, mood, and affect were noted. The cranial nerve  functions were examined.  Other pertinent positive and negative findings on physical examination:   Breathing comfortably on room air, no stridor with deep inspiration  no throat clearing throughout the visit     All other physical examination findings were within normal limits and noncontributory     Review of Relevant Clinical Data   Notes: Evita Hampton 12/31/20  Radiology: CT chest  4/8/2020      Procedures: 4/18/19 - bronchoscopy - mucosa was erythematous  Labs:  No results found for: TSH  No results found for: NA, CO2, BUN, CREAT, GLUCOSE, PHOS  No results found for: WBC, HGB, HCT, MCV, PLT  No results found for: PT, PTT, INR  No results found for: LALO  No components found for: RHEUMATOIDFACTOR,   RF  No results found for: CRP  No components found for: CKTOT, URICACID  No components found for: C3, C4, DSDNAAB, NDNAABIFA  No results found for: MPOAB    Patient reported Quality of Life (QOL) Measures   Patient Supplied Answers To VHI Questionnaire  No flowsheet data found.      Patient Supplied Answers To EAT Questionnaire  No flowsheet data found.      Patient Supplied Answers To CSI Questionnaire  No flowsheet data found.      Patient Supplied Answers to Dyspnea Index Questionnaire:  No flowsheet data found.    Impression & Plan     IMPRESSION: Ms. Mcdaniels is a 55 year old female who is being seen for the followin. Lump of phlegm  - after her UPPP/turb surgery on 2019  - complicated by vomiting post-o and URI   - symptoms started after that  - worse in the morning  - better with food  - not affected by talking or breathing  - coughing in the morning - to clear the phlegm  - did have a dry cough in  that resolved - had a bronchoscopy in 2019 with mild mucosal irritation and question of bacterial infection on cultures  - CT chest 2020- shows patent subglottis  - does have GERD/LPRD symptoms on omeprazole, does not have a GI  - discussed symptoms are mulitfactorial - from irritable larynx syndrome and laryngopharyngeal reflux, need to rule out lesion and esophageal etiology  Plan  - scope with voice SLP  - start salt water gargles  - start laryngopharyngeal reflux precautions  - Xray Video Swallow Exam with esophagram  - gastroenterology referral   - obtain CT sinus    RETURN VISIT: office     Thank you for the kind referral and for allowing me to share in the care of Ms. Mcdaniels. If you have any questions, please do not hesitate to contact me.    Wen Salazar MD    Laryngology    [facility]  Department of  Otolaryngology - Head and Neck Surgery  Cl[facility] Center  [address] [place] [id number]  Appointment line: [phone]  Fax:  [phone]    40 minutes spent on the date of the encounter doing chart review, interpretation of tests, patient visit and documentation

## 2021-03-09 NOTE — TELEPHONE ENCOUNTER
Records Requested  03/09/21    Facility  [facility]  Fax: [phone]   Outcome * 3/9/21 2:09 PM Faxed urg req to [facility] for images to be pushed to [facility]. - [place]    * 3/10/21 7:44 AM Images received and attached to patient in PACs. - [place]    1/6/21 - CT Sinus WO

## 2021-03-10 ENCOUNTER — DOCUMENTATION ONLY (OUTPATIENT)
Dept: OTOLARYNGOLOGY | Facility: CLINIC | Age: 56
End: 2021-03-10

## 2021-03-10 NOTE — PROGRESS NOTES
Spoke to patient and gave information to set up my chart account and also emailed her AVS as per providers instructions.

## 2021-03-12 ENCOUNTER — TELEPHONE (OUTPATIENT)
Dept: OTOLARYNGOLOGY | Facility: CLINIC | Age: 56
End: 2021-03-12

## 2021-03-12 NOTE — TELEPHONE ENCOUNTER
LVM letting pt know she has been scheduled for a Video Swallow Study as well as an in clinic appt with Dr. Salazar on 4/15.    If date and time do not work, pt is welcome to reschedule as desired. However, Video Swallow should be done same day either prior to or post appt.    Left call center number for rescheduling. If pt needs to reschedule Video Swallow please transfer to  Rehab [phone].

## 2021-03-25 ENCOUNTER — DOCUMENTATION ONLY (OUTPATIENT)
Dept: OTOLARYNGOLOGY | Facility: CLINIC | Age: 56
End: 2021-03-25

## 2021-04-10 NOTE — TELEPHONE ENCOUNTER
REFERRAL INFORMATION:    Referring Provider:  Dr. Salazar    Referring Clinic:  [facility]     Reason for Visit/Diagnosis: GERD, LPRD     FUTURE VISIT INFORMATION:    Appointment Date: 4/29/2021    Appointment Time: 4 PM      NOTES STATUS DETAILS   OFFICE NOTE from Referring Provider Internal 3/9/2021 Office visit with Dr. Salazar   OFFICE NOTE from Other Specialist Internal 12/31/2020, 1/21/2020 Office visit with Dr. Evita Hampton ([facility])    HOSPITAL DISCHARGE SUMMARY/  ED VISITS N/A    OPERATIVE REPORT N/A    MEDICATION LIST Internal         ENDOSCOPY  N/A    COLONOSCOPY N/A    ERCP N/A    EUS N/A    STOOL TESTING Care Everywhere 11/18/19   PERTINENT LABS Care Everywhere/ Internal     PATHOLOGY REPORTS (RELATED) N/A    IMAGING (CT, MRI, EGD, MRCP, Small Bowel Follow Through/SBT, MR/CT Enterography) Internal XR Video Swallow: 4/15/2021  XR Esophagram: 4/15/2021

## 2021-04-11 ENCOUNTER — HEALTH MAINTENANCE LETTER (OUTPATIENT)
Age: 56
End: 2021-04-11

## 2021-04-13 NOTE — PROGRESS NOTES
[facility]   at the [facility]   Otolaryngology Clinic     Patient: Tessie Mcdaniels    MRN: [id number]    : 1965    Age/Gender: 55 year old female  Date of Service: 4/15/2021  Rendering Provider:   Wen Salazar MD     Chief Complaint   Phlegm, globus sensation  Interval History   HISTORY OF PRESENT ILLNESS: Ms. Mcdaniels is a 55 year old female is being followed for phlegm/globus sensation for 1.5 years. She was initially seen on 3/9/21. Please refer to this note for full history.     Of note, she has history of UPPP and bilateral inferior turbinate reduction 19.     Today, she presents for follow up. she reports:  - mucus most mornings  - throat clearing  - she feels globus - lump in the throat  - denies dysphonia  - feels dyspnea at times  - improvement on omeprazole 20mg/day  - gargled a few days  - she feels like she needs to get the mucus out  - she has been improving her diet - less spicy and eating less late at night    Dysphonia: Patient denies dysphonia. This is stable      Dysphagia: Patient reports dysphagia. This is stable     Dyspnea: Patient reports dyspnea. This is stable     Throat clearing/Chronic cough: Patient reports throat clearing/chronic cough. This is stable     LPRD/GERD: Patient reports LPRD/GERD symptoms. This is stable     PAST MEDICAL HISTORY:   Past Medical History:   Diagnosis Date     Sleep apnea        PAST SURGICAL HISTORY:   Past Surgical History:   Procedure Laterality Date     TONSILLECTOMY ADULT Bilateral 2019    Procedure: Bilateral Tonsillectomy;  Surgeon: Evita Hampton MD;  Location: [facility] OR     TURBINECTOMY Bilateral 2019    Procedure: Turbinectomy;  Surgeon: Evita Hampton MD;  Location: [facility] OR     UVULOPALATOPHARYNGOPLASTY N/A 2019    Procedure: Uvulopalatopharyngoplasty, bilateral tonsilectomy and bilateral inferior turbinoplasty;  Surgeon: Evita Hampton MD;  Location: [facility] OR        CURRENT MEDICATIONS:   Current Outpatient Medications:      albuterol (PROVENTIL HFA) 108 (90 Base) MCG/ACT inhaler, Inhale 2 puffs into the lungs, Disp: , Rfl:      ALPRAZolam (XANAX PO), Take 1 mg by mouth as needed for anxiety , Disp: , Rfl:      DULoxetine (CYMBALTA) 60 MG capsule, Take 120 mg by mouth, Disp: , Rfl:      estradiol (ESTRACE) 0.1 MG/GM vaginal cream, Place 1 g vaginally, Disp: , Rfl:      FLUoxetine HCl (PROZAC PO), Take 20 mg by mouth daily , Disp: , Rfl:      fluticasone (FLONASE) 50 MCG/ACT nasal spray, Spray 1 spray in nostril, Disp: , Rfl:      lamoTRIgine (LAMICTAL) 25 MG chewable tablet, Take 2 tablets (50 mg) by mouth daily for 14 days, Disp: 28 tablet, Rfl: 0     meloxicam (MOBIC) 15 MG tablet, Take 1 tablet (15 mg) by mouth daily (Patient not taking: Reported on 12/31/2020), Disp: 10 tablet, Rfl: 0     meloxicam (MOBIC) 7.5 MG tablet, Take 7.5 mg by mouth daily, Disp: , Rfl:      pravastatin (PRAVACHOL) 40 MG tablet, , Disp: , Rfl: 1     predniSONE (DELTASONE) 20 MG tablet, Take 1 tablet (20 mg) by mouth daily Start on the third day after surgery (Patient not taking: Reported on 12/31/2020), Disp: 5 tablet, Rfl: 0     traMADol (ULTRAM) 50 MG tablet, Take 50 mg by mouth, Disp: , Rfl:      TRAZODONE HCL PO, Take 100 mg by mouth At Bedtime , Disp: , Rfl:      triamcinolone (KENALOG) 0.1 % external cream, , Disp: , Rfl:     ALLERGIES: Diflucan [fluconazole] and Sulfa drugs    SOCIAL HISTORY:    Social History     Socioeconomic History     Marital status:      Spouse name: Not on file     Number of children: Not on file     Years of education: Not on file     Highest education level: Not on file   Occupational History     Not on file   Social Needs     Financial resource strain: Not on file     Food insecurity     Worry: Not on file     Inability: Not on file     Transportation needs     Medical: Not on file     Non-medical: Not on file   Tobacco Use     Smoking status: Former  Smoker     Packs/day: 0.00     Smokeless tobacco: Never Used     Tobacco comment: quit 5 years ago   Substance and Sexual Activity     Alcohol use: Never     Frequency: Never     Drug use: Not on file     Sexual activity: Not on file   Lifestyle     Physical activity     Days per week: Not on file     Minutes per session: Not on file     Stress: Not on file   Relationships     Social connections     Talks on phone: Not on file     Gets together: Not on file     Attends Yazidism service: Not on file     Active member of club or organization: Not on file     Attends meetings of clubs or organizations: Not on file     Relationship status: Not on file     Intimate partner violence     Fear of current or ex partner: Not on file     Emotionally abused: Not on file     Physically abused: Not on file     Forced sexual activity: Not on file   Other Topics Concern     Not on file   Social History Narrative     Not on file         FAMILY HISTORY: No family history on file.   Non-contributory for problems with anesthesia    REVIEW OF SYSTEMS:   The patient was asked a 14 point review of systems regarding constitutional symptoms, eye symptoms, ears, nose, mouth, throat symptoms, cardiovascular symptoms, respiratory symptoms, gastrointestinal symptoms, genitourinary symptoms, musculoskeletal symptoms, integumentary symptoms, neurological symptoms, psychiatric symptoms, endocrine symptoms, hematologic/lymphatic symptoms, and allergic/ immunologic symptoms.   The pertinent factors have been included in the HPI and below.  Patient Supplied Answers to Review of Systems  [facility] ROS 11/26/2019   Constitutional Problems with sleep   Neurology Dizzy spells, Headache   Psychology Frequently feeling depressed or sad, Frequently feeling anxious   Eyes Visual loss   Ears, Nose, Throat Hearing loss, Ear pain, Ringing/noise in ears, Nasal congestion or drainage, Sore throat, Trouble swallowing, Hoarseness   Cardiopulmonary -    Gastrointestinal/Genitourinary Heartburn/indigestion, Constipation, Diarrhea   Musculoskeletal Back pain   Allergy/Immunology -   Endocrine Frequent urination   Other Problems with anesthesia in surgery       Physical Examination   The patient underwent a physical examination as described below. The pertinent positive and negative findings are summarized after the description of the examination.  Constitutional: The patient's developmental and nutritional status was assessed. The patient's voice quality was assessed.  Head and Face: The head and face were inspected for deformities. The sinuses were palpated. The salivary glands were palpated. Facial muscle strength was assessed bilaterally.  Eyes: Extraocular movements and primary gaze alignment were assessed.  Ears, Nose, Mouth and Throat: The ears and nose were examined for deformities. The nasal septum, mucosa, and turbinates were inspected by anterior rhinoscopy. The lips, teeth, and gums were examined for abnormalities. The oral mucosa, tongue, palate, tonsils, lateral and posterior pharynx were inspected for the presence of asymmetry or mucosal lesions.    Neck: The tracheal position was noted, and the neck mass palpated to determine if there were any asymmetries, abnormal neck masses, thyromegally, or thyroid nodules.  Respiratory: The nature of the breathing and chest expansion/symmetry was observed.  Cardiovascular: The patient was examined to determine the presence of any edema or jugular venous distension.  Abdomen: The contour of the abdomen was noted.  Lymphatic: The patient was examined for infraclavicular lymphadenopathy.  Musculoskeletal: The patient was inspected for the presence of skeletal deformities.  Extremities: The extremities were examined for any clubbing or cyanosis.  Skin: The skin was examined for inflammatory or neoplastic conditions.  Neurologic: The patient's orientation, mood, and affect were noted. The cranial nerve  functions  were examined.  Other pertinent positive and negative findings on physical examination:   OC/OP: no lesions, uvula midline, soft palate elevates symmetrically, FOM/BOT soft  Neck: no lesions, bilateral TH tenderness to palpation    All other physical examination findings were within normal limits and noncontributory.    Procedures   Flexible laryngoscopy (CPT 56057)      Pre-procedure diagnosis: globus  Post-procedure diagnosis: same as above  Indication for procedure: Ms. Mcdaniels is a 55 year old female with see above  Procedure(s): Fiberoptic Laryngoscopy    Details of Procedure: After informed consent was obtained, the patient was seated in the examination chair.  The areas of the nasopharynx as well as the hypopharynx were anesthetized with topical 4% lidocaine with 0.25% phenylephrine atomizer.  Examination of the base of tongue was performed first.  Attention was directed to any evidence of masses in the area or evidence of leukoplakia or candidal infection.  Attention was directed to the epiglottis where its size and position was determined and its movement on phonation of the vowel  e .  The piriform sinuses were then inspected for any mass lesions or pooling of secretions.  Attention was then directed to the larynx. The vocal folds were inspected for infection or any areas of leukoplakia, for masses, polypoid degeneration, or hemorrhage.  Having done this, the arytenoids and vocal processes were inspected for erythema or evidence of granuloma formation.  The posterior commissure was then inspected for evidence of inflammatory changes in the mucosa and heaping up of mucosal tissue. The patient was then instructed to say the vowel  e .  Adduction of vocal folds to the midline was observed for any evidence of paresis or paralysis of the larynx or asymmetry in rotation of the larynx to the left or right. The patient was asked to breathe and the degree of abduction was noted bilaterally.  Subglottic view of the  larynx was obtained for any additional mass lesions or mucosal changes.  Finally the post cricoid was examined for evidence of pooling of secretions, as well as the pharyngeal wall mucosa.   Anesthesia type: 0.25% phenylephrine    Findings:  Anatomic/physiological deviations: mild lingual tonsillar hypertrophy, no pooling of saliva    Right vocal process: No restriction of mobility   Left vocal process: No restriction of mobility  Glottal gap: Complete glottal closure  Supraglottic structures: Normal  Hypopharynx: Normal     Estimated Blood Loss: minimal  Complications: None  Disposition: Patient tolerated the procedure well                  Review of Relevant Clinical Data     Radiology: CT sinus 2021 - clear       Labs:  No results found for: TSH  No results found for: NA, CO2, BUN, CREAT, GLUCOSE, PHOS  No results found for: WBC, HGB, HCT, MCV, PLT  No results found for: PT, PTT, INR  No results found for: LALO  No components found for: RHEUMATOIDFACTOR,  RF  No results found for: CRP  No components found for: CKTOT, URICACID  No components found for: C3, C4, DSDNAAB, NDNAABIFA  No results found for: MPOAB    Patient reported Quality of Life (QOL) Measures   Patient Supplied Answers To VHI Questionnaire  No flowsheet data found.      Patient Supplied Answers To EAT Questionnaire  No flowsheet data found.      Patient Supplied Answers To CSI Questionnaire  No flowsheet data found.      Patient Supplied Answers to Dyspnea Index Questionnaire:  No flowsheet data found.    Impression & Plan     IMPRESSION: Ms. Mcdaniels is a 55 year old female who is being seen for the followin. Lump of phlegm  - after her UPPP/turb surgery on 2019  - complicated by vomiting post-o and URI   - symptoms started after that  - worse in the morning  - better with food  - not affected by talking or breathing  - coughing in the morning - to clear the phlegm  - did have a dry cough in 2019 that resolved - had a bronchoscopy in  4/2019 with mild mucosal irritation and question of bacterial infection on cultures  - CT chest 4/2020- shows patent subglottis  - does have GERD/LPRD symptoms on omeprazole, does not have a GI  - discussed symptoms are mulitfactorial - from irritable larynx syndrome and laryngopharyngeal reflux, need to rule out lesion and esophageal etiology  - CT sinus reviewed - no sinus disease  - scope today shows mild lingual tonsillar hypertrophy, no pooling of saliva, mild pharyngeal and laryngeal tremor at rest   - 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  - can also be due to nasal and GI symptoms  Plan  - review Xray Video Swallow Exam with esophagram at swallow rounds  - follow up with gastroenterology  - flonase and netti pot for 1 month - if not better - see an allergist   - voice therapy         RETURN VISIT: as needed    Wen Salazar MD    Laryngology    [facility]  Department of  Otolaryngology - Head and Neck Surgery  Clinics & Surgery Center  [address] [place] [id number]  Appointment line: [phone]  Fax: [phone]  [email or web address]

## 2021-04-15 ENCOUNTER — OFFICE VISIT (OUTPATIENT)
Dept: OTOLARYNGOLOGY | Facility: CLINIC | Age: 56
End: 2021-04-15
Payer: COMMERCIAL

## 2021-04-15 ENCOUNTER — ANCILLARY PROCEDURE (OUTPATIENT)
Dept: GENERAL RADIOLOGY | Facility: CLINIC | Age: 56
End: 2021-04-15
Attending: OTOLARYNGOLOGY
Payer: COMMERCIAL

## 2021-04-15 ENCOUNTER — VIRTUAL VISIT (OUTPATIENT)
Dept: OTOLARYNGOLOGY | Facility: CLINIC | Age: 56
End: 2021-04-15
Payer: COMMERCIAL

## 2021-04-15 ENCOUNTER — THERAPY VISIT (OUTPATIENT)
Dept: SPEECH THERAPY | Facility: CLINIC | Age: 56
End: 2021-04-15
Attending: OTOLARYNGOLOGY
Payer: COMMERCIAL

## 2021-04-15 VITALS — TEMPERATURE: 96.9 F | HEART RATE: 91 BPM | BODY MASS INDEX: 30.67 KG/M2 | HEIGHT: 66 IN | OXYGEN SATURATION: 95 %

## 2021-04-15 DIAGNOSIS — R05.3 CHRONIC COUGH: ICD-10-CM

## 2021-04-15 DIAGNOSIS — R09.A2 GLOBUS PHARYNGEUS: Primary | ICD-10-CM

## 2021-04-15 DIAGNOSIS — R13.12 OROPHARYNGEAL DYSPHAGIA: ICD-10-CM

## 2021-04-15 DIAGNOSIS — R09.A2 GLOBUS PHARYNGEUS: ICD-10-CM

## 2021-04-15 DIAGNOSIS — R49.0 DYSPHONIA: Primary | ICD-10-CM

## 2021-04-15 DIAGNOSIS — J38.7 IRRITABLE LARYNX: ICD-10-CM

## 2021-04-15 PROCEDURE — 92611 MOTION FLUOROSCOPY/SWALLOW: CPT | Mod: GN | Performed by: SPEECH-LANGUAGE PATHOLOGIST

## 2021-04-15 PROCEDURE — 92524 BEHAVRAL QUALIT ANALYS VOICE: CPT | Mod: GN | Performed by: SPEECH-LANGUAGE PATHOLOGIST

## 2021-04-15 PROCEDURE — 31575 DIAGNOSTIC LARYNGOSCOPY: CPT | Performed by: OTOLARYNGOLOGY

## 2021-04-15 PROCEDURE — 74230 X-RAY XM SWLNG FUNCJ C+: CPT | Mod: GC | Performed by: RADIOLOGY

## 2021-04-15 PROCEDURE — 99207 PR NO CHARGE LOS: CPT | Mod: 25 | Performed by: OTOLARYNGOLOGY

## 2021-04-15 PROCEDURE — 99207 PR NO CHARGE LOS: CPT | Performed by: SPEECH-LANGUAGE PATHOLOGIST

## 2021-04-15 PROCEDURE — 92610 EVALUATE SWALLOWING FUNCTION: CPT | Mod: GN | Performed by: SPEECH-LANGUAGE PATHOLOGIST

## 2021-04-15 RX ORDER — BARIUM SULFATE 400 MG/ML
30 SUSPENSION ORAL ONCE
Status: COMPLETED | OUTPATIENT
Start: 2021-04-15 | End: 2021-04-15

## 2021-04-15 RX ORDER — FLUTICASONE PROPIONATE 50 MCG
2 SPRAY, SUSPENSION (ML) NASAL 2 TIMES DAILY
Qty: 11.1 ML | Refills: 3 | Status: SHIPPED | OUTPATIENT
Start: 2021-04-15 | End: 2021-05-15

## 2021-04-15 RX ADMIN — BARIUM SULFATE 30 ML: 400 SUSPENSION ORAL at 11:39

## 2021-04-15 ASSESSMENT — PAIN SCALES - GENERAL: PAINLEVEL: NO PAIN (0)

## 2021-04-15 NOTE — LETTER
4/15/2021       RE: Tessie Mcdaniels  [address]  [place] [address]     Dear Colleague,    Thank you for referring your patient, Tessie Mcdaniels, to the [facility] EAR NOSE AND TH[facility]IC [place] at [facility]. Please see a copy of my visit note below.        [facility]   at the [facility]   Otolaryngology Clinic     Patient: Tessie Mcdaniels    MRN: [id number]    : 1965    Age/Gender: 55 year old female  Date of Service: 4/15/2021  Rendering Provider:   Wen Salazar MD     Chief Complaint   Phlegm, globus sensation  Interval History   HISTORY OF PRESENT ILLNESS: Ms. Mcdaniels is a 55 year old female is being followed for phlegm/globus sensation for 1.5 years. She was initially seen on 3/9/21. Please refer to this note for full history.     Of note, she has history of UPPP and bilateral inferior turbinate reduction 19.     Today, she presents for follow up. she reports:  - mucus most mornings  - throat clearing  - she feels globus - lump in the throat  - denies dysphonia  - feels dyspnea at times  - improvement on omeprazole 20mg/day  - gargled a few days  - she feels like she needs to get the mucus out  - she has been improving her diet - less spicy and eating less late at night    Dysphonia: Patient denies dysphonia. This is stable      Dysphagia: Patient reports dysphagia. This is stable     Dyspnea: Patient reports dyspnea. This is stable     Throat clearing/Chronic cough: Patient reports throat clearing/chronic cough. This is stable     LPRD/GERD: Patient reports LPRD/GERD symptoms. This is stable     PAST MEDICAL HISTORY:   Past Medical History:   Diagnosis Date     Sleep apnea        PAST SURGICAL HISTORY:   Past Surgical History:   Procedure Laterality Date     TONSILLECTOMY ADULT Bilateral 2019    Procedure: Bilateral Tonsillectomy;  Surgeon: Evita Hampton MD;  Location:  OR      TURBINECTOMY Bilateral 11/13/2019    Procedure: Turbinectomy;  Surgeon: Evita Hampton MD;  Location: [facility] OR     UVULOPALATOPHARYNGOPLASTY N/A 11/13/2019    Procedure: Uvulopalatopharyngoplasty, bilateral tonsilectomy and bilateral inferior turbinoplasty;  Surgeon: Evita Hampton MD;  Location:  OR       CURRENT MEDICATIONS:   Current Outpatient Medications:      albuterol (PROVENTIL HFA) 108 (90 Base) MCG/ACT inhaler, Inhale 2 puffs into the lungs, Disp: , Rfl:      ALPRAZolam (XANAX PO), Take 1 mg by mouth as needed for anxiety , Disp: , Rfl:      DULoxetine (CYMBALTA) 60 MG capsule, Take 120 mg by mouth, Disp: , Rfl:      estradiol (ESTRACE) 0.1 MG/GM vaginal cream, Place 1 g vaginally, Disp: , Rfl:      FLUoxetine HCl (PROZAC PO), Take 20 mg by mouth daily , Disp: , Rfl:      fluticasone (FLONASE) 50 MCG/ACT nasal spray, Spray 1 spray in nostril, Disp: , Rfl:      lamoTRIgine (LAMICTAL) 25 MG chewable tablet, Take 2 tablets (50 mg) by mouth daily for 14 days, Disp: 28 tablet, Rfl: 0     meloxicam (MOBIC) 15 MG tablet, Take 1 tablet (15 mg) by mouth daily (Patient not taking: Reported on 12/31/2020), Disp: 10 tablet, Rfl: 0     meloxicam (MOBIC) 7.5 MG tablet, Take 7.5 mg by mouth daily, Disp: , Rfl:      pravastatin (PRAVACHOL) 40 MG tablet, , Disp: , Rfl: 1     predniSONE (DELTASONE) 20 MG tablet, Take 1 tablet (20 mg) by mouth daily Start on the third day after surgery (Patient not taking: Reported on 12/31/2020), Disp: 5 tablet, Rfl: 0     traMADol (ULTRAM) 50 MG tablet, Take 50 mg by mouth, Disp: , Rfl:      TRAZODONE HCL PO, Take 100 mg by mouth At Bedtime , Disp: , Rfl:      triamcinolone (KENALOG) 0.1 % external cream, , Disp: , Rfl:     ALLERGIES: Diflucan [fluconazole] and Sulfa drugs    SOCIAL HISTORY:    Social History     Socioeconomic History     Marital status:      Spouse name: Not on file     Number of children: Not on file     Years of education: Not on file      Highest education level: Not on file   Occupational History     Not on file   Social Needs     Financial resource strain: Not on file     Food insecurity     Worry: Not on file     Inability: Not on file     Transportation needs     Medical: Not on file     Non-medical: Not on file   Tobacco Use     Smoking status: Former Smoker     Packs/day: 0.00     Smokeless tobacco: Never Used     Tobacco comment: quit 5 years ago   Substance and Sexual Activity     Alcohol use: Never     Frequency: Never     Drug use: Not on file     Sexual activity: Not on file   Lifestyle     Physical activity     Days per week: Not on file     Minutes per session: Not on file     Stress: Not on file   Relationships     Social connections     Talks on phone: Not on file     Gets together: Not on file     Attends [employer] service: Not on file     Active member of club or organization: Not on file     Attends meetings of clubs or organizations: Not on file     Relationship status: Not on file     Intimate partner violence     Fear of current or ex partner: Not on file     Emotionally abused: Not on file     Physically abused: Not on file     Forced sexual activity: Not on file   Other Topics Concern     Not on file   Social History Narrative     Not on file         FAMILY HISTORY: No family history on file.   Non-contributory for problems with anesthesia    REVIEW OF SYSTEMS:   The patient was asked a 14 point review of systems regarding constitutional symptoms, eye symptoms, ears, nose, mouth, throat symptoms, cardiovascular symptoms, respiratory symptoms, gastrointestinal symptoms, genitourinary symptoms, musculoskeletal symptoms, integumentary symptoms, neurological symptoms, psychiatric symptoms, endocrine symptoms, hematologic/lymphatic symptoms, and allergic/ immunologic symptoms.   The pertinent factors have been included in the HPI and below.  Patient Supplied Answers to Review of Systems  [facility] ENT ROS 11/26/2019   Constitutional  Problems with sleep   Neurology Dizzy spells, Headache   Psychology Frequently feeling depressed or sad, Frequently feeling anxious   Eyes Visual loss   Ears, Nose, Throat Hearing loss, Ear pain, Ringing/noise in ears, Nasal congestion or drainage, Sore throat, Trouble swallowing, Hoarseness   Cardiopulmonary -   Gastrointestinal/Genitourinary Heartburn/indigestion, Constipation, Diarrhea   Musculoskeletal Back pain   Allergy/Immunology -   Endocrine Frequent urination   Other Problems with anesthesia in surgery       Physical Examination   The patient underwent a physical examination as described below. The pertinent positive and negative findings are summarized after the description of the examination.  Constitutional: The patient's developmental and nutritional status was assessed. The patient's voice quality was assessed.  Head and Face: The head and face were inspected for deformities. The sinuses were palpated. The salivary glands were palpated. Facial muscle strength was assessed bilaterally.  Eyes: Extraocular movements and primary gaze alignment were assessed.  Ears, Nose, Mouth and Throat: The ears and nose were examined for deformities. The nasal septum, mucosa, and turbinates were inspected by anterior rhinoscopy. The lips, teeth, and gums were examined for abnormalities. The oral mucosa, tongue, palate, tonsils, lateral and posterior pharynx were inspected for the presence of asymmetry or mucosal lesions.    Neck: The tracheal position was noted, and the neck mass palpated to determine if there were any asymmetries, abnormal neck masses, thyromegally, or thyroid nodules.  Respiratory: The nature of the breathing and chest expansion/symmetry was observed.  Cardiovascular: The patient was examined to determine the presence of any edema or jugular venous distension.  Abdomen: The contour of the abdomen was noted.  Lymphatic: The patient was examined for infraclavicular lymphadenopathy.  Musculoskeletal:  The patient was inspected for the presence of skeletal deformities.  Extremities: The extremities were examined for any clubbing or cyanosis.  Skin: The skin was examined for inflammatory or neoplastic conditions.  Neurologic: The patient's orientation, mood, and affect were noted. The cranial nerve  functions were examined.  Other pertinent positive and negative findings on physical examination:   OC/OP: no lesions, uvula midline, soft palate elevates symmetrically, FOM/BOT soft  Neck: no lesions, bilateral TH tenderness to palpation    All other physical examination findings were within normal limits and noncontributory.    Procedures   Flexible laryngoscopy (CPT 94938)      Pre-procedure diagnosis: globus  Post-procedure diagnosis: same as above  Indication for procedure: Ms. Mcdaniels is a 55 year old female with see above  Procedure(s): Fiberoptic Laryngoscopy    Details of Procedure: After informed consent was obtained, the patient was seated in the examination chair.  The areas of the nasopharynx as well as the hypopharynx were anesthetized with topical 4% lidocaine with 0.25% phenylephrine atomizer.  Examination of the base of tongue was performed first.  Attention was directed to any evidence of masses in the area or evidence of leukoplakia or candidal infection.  Attention was directed to the epiglottis where its size and position was determined and its movement on phonation of the vowel  e .  The piriform sinuses were then inspected for any mass lesions or pooling of secretions.  Attention was then directed to the larynx. The vocal folds were inspected for infection or any areas of leukoplakia, for masses, polypoid degeneration, or hemorrhage.  Having done this, the arytenoids and vocal processes were inspected for erythema or evidence of granuloma formation.  The posterior commissure was then inspected for evidence of inflammatory changes in the mucosa and heaping up of mucosal tissue. The patient was  then instructed to say the vowel  e .  Adduction of vocal folds to the midline was observed for any evidence of paresis or paralysis of the larynx or asymmetry in rotation of the larynx to the left or right. The patient was asked to breathe and the degree of abduction was noted bilaterally.  Subglottic view of the larynx was obtained for any additional mass lesions or mucosal changes.  Finally the post cricoid was examined for evidence of pooling of secretions, as well as the pharyngeal wall mucosa.   Anesthesia type: 0.25% phenylephrine    Findings:  Anatomic/physiological deviations: mild lingual tonsillar hypertrophy, no pooling of saliva    Right vocal process: No restriction of mobility   Left vocal process: No restriction of mobility  Glottal gap: Complete glottal closure  Supraglottic structures: Normal  Hypopharynx: Normal     Estimated Blood Loss: minimal  Complications: None  Disposition: Patient tolerated the procedure well                  Review of Relevant Clinical Data     Radiology: CT sinus 2021 - clear       Labs:  No results found for: TSH  No results found for: NA, CO2, BUN, CREAT, GLUCOSE, PHOS  No results found for: WBC, HGB, HCT, MCV, PLT  No results found for: PT, PTT, INR  No results found for: LALO  No components found for: RHEUMATOIDFACTOR,  RF  No results found for: CRP  No components found for: CKTOT, URICACID  No components found for: C3, C4, DSDNAAB, NDNAABIFA  No results found for: MPOAB    Patient reported Quality of Life (QOL) Measures   Patient Supplied Answers To VHI Questionnaire  No flowsheet data found.      Patient Supplied Answers To EAT Questionnaire  No flowsheet data found.      Patient Supplied Answers To CSI Questionnaire  No flowsheet data found.      Patient Supplied Answers to Dyspnea Index Questionnaire:  No flowsheet data found.    Impression & Plan     IMPRESSION: Ms. Mcdaniels is a 55 year old female who is being seen for the followin. Lump of phlegm  -  after her UPPP/turb surgery on 11/2019  - complicated by vomiting post-o and URI   - symptoms started after that  - worse in the morning  - better with food  - not affected by talking or breathing  - coughing in the morning - to clear the phlegm  - did have a dry cough in 2019 that resolved - had a bronchoscopy in 4/2019 with mild mucosal irritation and question of bacterial infection on cultures  - CT chest 4/2020- shows patent subglottis  - does have GERD/LPRD symptoms on omeprazole, does not have a GI  - discussed symptoms are mulitfactorial - from irritable larynx syndrome and laryngopharyngeal reflux, need to rule out lesion and esophageal etiology  - CT sinus reviewed - no sinus disease  - scope today shows mild lingual tonsillar hypertrophy, no pooling of saliva, mild pharyngeal and laryngeal tremor at rest   - 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  - can also be due to nasal and GI symptoms  Plan  - review Xray Video Swallow Exam with esophagram at swallow rounds  - follow up with gastroenterology  - flonase and netti pot for 1 month - if not better - see an allergist   - voice therapy         RETURN VISIT: as needed    Wen Salazar MD    Laryngology    [facility]  Department of  Otolaryngology - Head and Neck Surgery  Clinics & Surgery Center  [address] [place] [address]  Appointment line: [phone]  Fax: [phone]  [email or web address]

## 2021-04-15 NOTE — PATIENT INSTRUCTIONS
1.  You were seen in the ENT Clinic today by . If you have any questions or concerns after your appointment, please call [phone]. Press option #1 for scheduling related needs. Press option #3 for Nurse advice.    2.   has recommended  the following:   - Voice Therapy   - Follow up with GI   - Use Flonase spray as instructed. Prescription sent to your pharmacy. Prior to using the Flonase use the Neti pot nasal rinse as below  Neti Pot Instructions    1. Leaning over a sink, tilt your head sideways with your forehead and chin roughly level to avoid liquid flowing into your mouth.  2. Breathing through your open mouth, insert the spout of the saline-filled container into your upper nostril so that the liquid drains through the lower nostril  3. Clear your nostrils, then repeat the procedure, tilting your head sideways, on the other side.     -If your symptoms do not improve in one month, follow up with allergist    3.  Plan is to return to clinic as needed or sooner with any new or worsening symptoms.      Nelsy Sosa LPN  [phone]  [facility] - Otolaryngology

## 2021-04-15 NOTE — PROGRESS NOTES
"     Speech-Language Pathology Department   EVALUATION  [facility]      04/15/21 1100   General Information   Type Of Visit Initial   Start Of Care Date 04/15/21   Referring Physician Dr. Wen Salazar   Orders Evaluate And Treat   Orders Comment Video Swallow Study   Medical Diagnosis Oropharyngeal dysphagia   Precautions/limitations No Known Precautions/limitations   Hearing WFL   Pertinent History of Current Problem/OT: Additional Occupational Profile Info Tessie Mcdaniels is a 55-year old woman who was referred for video swallow study due to excess phlegm/globus sensation over the past year and a half. She feels like there is a lump of phlegm in her throat all the time. She notes this more when she wakes up in the morning. She is also very anxious about her symptoms. She has had a chronic cough for the past year. Today she notes no foods or liquids improve or worsen the feeling. She denies coughing/choking while eating/drinking. She also denies pneumonia or upper respiratory infection. Denies changes in her voice function. She has tried the gargling instead of hawking up phlegm but this doesn't seem to have helped. She has only done this for 3 days so \"maybe need to try longer\". The lump in her throat seems to have appeared after she had a cold following her sleep apnea surgery in November 2019. She does take omeprazole which may be helping.    Respiratory Status Room air   Prior Level Of Function Swallowing   Prior Level Of Function Comment Regular solids and thin liquids   General Observations Pt generally pleasant and cooperative.    Patient/family Goals Get rid of the thick phlegm   Fall Risk Screen   Fall screen completed by SLP   Have you fallen 2 or more times in the past year? No   Have you fallen and had an injury in the past year? No   Is patient a fall risk? No   Abuse Screen (yes response referral indicated)   Feels Unsafe at Home or Work/School no   Feels " Threatened by Someone no   Does Anyone Try to Keep You From Having Contact with Others or Doing Things Outside Your Home? no   Physical Signs of Abuse Present no   Clinical Swallow Evaluation   Oral Musculature generally intact   Structural Abnormalities none present   Dentition present and adequate   Mucosal Quality good   Mandibular Strength and Mobility intact   Oral Labial Strength and Mobility WFL   Lingual Strength and Mobility WFL   Velar Elevation intact   Buccal Strength and Mobility intact   Laryngeal Function Swallow;Voicing initiated;Cough   Additional evaluation(s) completed today Yes   Rationale for completing additional evaluation Video Swallow Study to further assess pharyngeal function including aspiration risk and UES patency.   VFSS Eval: Radiology   Radiologist Resident, Dr. Bernal   Views Taken left lateral;A/P   Physical Location of Procedure [facility] Imaging room2   VFSS Eval: Thin Liquid Texture Trial   Mode of Presentation, Thin Liquid cup;self-fed   Order of Presentation Series 1, 2, 11 A/P)   Preparatory Phase WFL   Oral Phase, Thin Liquid WFL   Pharyngeal Phase, Thin Liquid WFL   Rosenbek's Penetration Aspiration Scale: Thin Liquid Trial Results 2 - contrast enters airway, remains above the vocal cords, no residue remains (penetration)   Diagnostic Statement Trace flash penetration noted on large sips of thin liquid.    VFSS Eval: Nectar Thick Liquid Texture Trial   Mode of Presentation, Nectar self-fed;cup   Order of Presentation Series 3, 4   Preparatory Phase WFL   Oral Phase, Nectar WFL   Pharyngeal Phase, Bazine WFL   Rosenbek's Penetration Aspiration Scale: Nectar-Thick Liquid Trial Results 1 - no aspiration, contrast does not enter airway   Diagnostic Statement No aspiration/penetration noted on nectar thick liquid on nectar thick liquid trials   VFSS Eval: Puree Solid Texture Trial   Mode of Presentation, Puree self-fed;spoon   Order of Presentation Series 5, 6, 10  (A/P)   Preparatory Phase WFL   Oral Phase, Puree WFL   Pharyngeal Phase, Puree WFL   Rosenbek's Penetration Aspiration Scale: Puree Food Trial Results 1 - no aspiration, contrast does not enter airway   Diagnostic Statement Pt demonstrates no aspiration/penetration on puree consistency trial.    VFSS Eval: Solid Food Texture Trial   Mode of Presentation, Solid self-fed   Order of Presentation Cookie Series 7, 8; Barium tablet with water Series 9   Preparatory Phase WFL   Oral Phase, Solid WFL   Pharyngeal Phase, Solid WFL   Rosenbek's Penetration Aspiration Scale: Solid Food Trial Results 1 - no aspiration, contrast does not enter airway   Diagnostic Statement Pt demonstrates no aspiration/penetration noted on solid consistency trials. No pharyngeal residue.    Swallow Compensations   Swallow Compensations No compensations were used   Educational Assessment   Barriers to Learning No barriers   Esophageal Phase of Swallow   Patient reports or presents with symptoms of esophageal dysphagia Yes   Esophageal comments Please refer to radiology report for further details on esophageal function   Swallow Eval: Clinical Impressions   Skilled Criteria for Therapy Intervention No problems identified which require skilled intervention   Dysphagia Outcome Severity Scale (JOSE) Level 7 - JOSE   Diet texture recommendations Regular diet;Thin liquids   Predicted Duration of Therapy Intervention (days/wks) Evaluation only   Risks and Benefits of Treatment have been explained. Yes   Patient, family and/or staff in agreement with Plan of Care Yes   Clinical Impression Comments Tessie Mcdaniels demonstrates safe functional oropharyngeal swallow as characterized by adequate ROM and strength of oral mechanism, no pharyngeal residue after the swallow, appropriate hyolaryngeal excursion on each swallow. She demonstrates functional oral phase with adequate oral manipulation and bolus transfer. Adequate UES opening for all consistencies  presented. A/P view demonstrated symmetrical swallow. She demonstrates trace flash penetration on large thin liquid swallow which spontaneously clears. Recommend regular consistency solids and thin liquids. Small bites/sips and slow rate. No further SLP services indicated for swallowing at this time.    Total Session Time   SLP Eval: oral/pharyngeal swallow function, clinical minutes (06839) 12   SLP Eval: VideoFluoroscopic Swallow function Minutes (21700) 18   Total Evaluation Time 30       Thank you for the referral of Tessie Mcdaniels. If you have any questions about this report, please contact me using the information below.      Sarah Cesar MS, CCC-SLP  Speech-Language Pathology  [facility]  Department of Otolaryngology/D&T - 4th floor  Pager: [phone]  Phone: [phone]  Email: [email or web address]

## 2021-04-15 NOTE — LETTER
4/15/2021       RE: Tessie Mcdaniels  [address]  [place] [facility] [address]     Dear Colleague,    Thank you for referring your patient, Tessie Mcdaniels, to the [facility] [place] at [facility]. Please see a copy of my visit note below.      Tessie Mcdaniels is a 55 year old female who is being cared for via a billable virtual visit.        The patient has been notified and verbally consented to the following statements:     This video visit will be conducted between you and your provider.    Patient has opted to conduct today's video visit vs an in-person appointment, and is not able to attend due to possible exposure to COVID-19.      If during the course of the call the provider feels a video visit is not appropriate, you will not be charged for this service.    Provider has received verbal consent for billable virtual visit from the patient? Yes    Preferred method for receiving information: email/ [employer]    Call initiated at: 1:04 PM  Platform used to conduct today's virtual appointment: [employer] video  Location of provider: Residence  Location of patient: [facility] with Dr. Salazar    [facility]  Michel Marley Jr., M.D., F.A.C.S.  Yokasta Dorsey M.D., M.P.H.  Wen Salazar M.D.  Jennifer Aleman, Ph.D., CCC-SLP  Tessie Robertson M.M. (voice), M.A., CCC-SLP  Adrian Ward M.M. (voice), M.A., CCC-SLP  EDMUND Valera (voice), M.S., CCC-SLP       Evaluation report    Clinician: Dr. Salazar  Evaluated in conjunction with: Dr. Wen Salazar  Referring physician:  Martin  Patient: Tessie Mcdaniels  Date of Visit: 4/15/2021    HISTORY  Chief complaint: Tessie Mcdaniels is a 55 year old presenting today for evaluation of throat and globus sensatoin.  Salient history: She has a history significant for Hx of anxiety.    CURRENT SYMPTOMS INCLUDE  VOICE    No significant issues    THROAT ISSUES: primary complaint    Mucus ball in the throat and produces a  dark yellow mucus ball in the throat.    Not every morning, but most mornings and there is always some  kind of mucus.     exerting    Has tried: saline gargle (not completely helpful), Flonase, omeprazole.     COUGH:  o Sometimes a dry cough  o Primarily mucus  o her cough triggers include:  - Morning     THROAT CLEARING:  o Not a significant issues    GLOBUS:  o 6-7/10 today    SWALLOWING    Modified barium swallow study this morning; relatively unremarkable    Has a small hiatal hernia and observed GERD     No significant dysphagia; continuing regular diet    RESPIRATION    Can feel labored at times    Shortness of breath seems random.  She is not sure, but will pay attention in the future.     ADDITIONAL    Reflux: Taking Omeprazole 20 mg     Sinus surgery with Dr. Hampton in November 2019.  3 weeks later, she developed a bad cold, and ever since that time, her symptoms have not been normal    She has not seen an allergist    OTHER PERTINENT HISTORY    Hx of severe anxiety disorder; has had some anxiety over this issue.  Works with her doctor at the [employer]    Mild hearing loss noted during today's appointment    Past Medical History:   Diagnosis Date     Sleep apnea      Past Surgical History:   Procedure Laterality Date     TONSILLECTOMY ADULT Bilateral 11/13/2019    Procedure: Bilateral Tonsillectomy;  Surgeon: Evita Hampton MD;  Location:  OR     TURBINECTOMY Bilateral 11/13/2019    Procedure: Turbinectomy;  Surgeon: Evita Hampton MD;  Location:  OR     UVULOPALATOPHARYNGOPLASTY N/A 11/13/2019    Procedure: Uvulopalatopharyngoplasty, bilateral tonsilectomy and bilateral inferior turbinoplasty;  Surgeon: Evita Hampton MD;  Location:  OR       OBJECTIVE  PATIENT REPORTED MEASURES  Patient Supplied Answers To VHI Questionnaire  No flowsheet data found.     Patient Supplied Answers To CSI Questionnaire  No flowsheet data found.    Patient Supplied Answers To EAT  Questionnaire  No flowsheet data found.    PERCEPTUAL EVALUATION (CPT 63982)  POSTURE / TENSION:     upper body    neck and shoulders    BREATHING:     appears within normal limits and adequate     clavicular muscle use pattern     phonation is not consistenly coordinated with respiration; experiences random moments of shortness of breath.    LARYNGEAL PALPATION:     firm musculature    VOICE:    Roughness: Mild Intermittent    Breathiness: WNL    Strain: WNL     Loudness    Conversational speech:  WNL    Projected speech:  N/a    Pitch:    Conversational speech:  Mildly lowered; 203Hz; in clinic 492 Hz    Pitch glide: neurologically normal    MPT: 15 seconds    Resonance:    Conversational speech:  laryngeal pharyngeal resonance    Singing vs. Speech:  n/a    CAPE-V Overall Severity:  10/100    COUGH/THROAT CLEARING:    Not observed, but she described her cough as a moderate, productive cough that is present most mornings    LARYNGEAL FUNCTION STUDIES (CPT 71061)  Recommend to be completed when able to come into clinic.    LARYNGEAL EXAMINATION  Recommend to be completed when able to come into clinic.  Procedure: Flexible endoscopy with chip-tip technology without stroboscopy, right nostril; topical anesthesia with 3% Lidocaine and 0.25% phenylephrine was applied.   Performed by: Dr. Salazar  The laryngeal and pharyngeal structures were evaluated for gross appearance, mobility, function, and focal lesions / abnormalities of the associated mucosa.    All findings were within normal limits with the exception of the following salient features:   This exam shows:    Laryngeal and Vocal Fold Mucosa    Posterior commissure hypertrophy    Minimal presence of thickened secretions on the vocal folds and throughout the laryngeal area    Status of vocal fold mucosa:   o Within normal limits, with no lesions and straight vibratory margins      Narrow Band Imaging yielded the following:  no additional information    Neurological  and Functional Integrity of the Larynx    Vocal folds are mobile and meet at midline; movement is brisk and symmetric    Intermittently a subtle tremor was noted at rest; often following a vocal activity  o Intermittently, the tremor was also noted during phonation.    Airway is patent     Elongation of the vocal folds for pitch increase is WNL    On phonation, glottic closure is acceptable     Supraglottic Function and Therapy Probes    Mild to moderate anterior-posterior constriction of the supraglottic larynx during connected speech    Therapy probes show   o Reduction in supraglottic hyperfunction during tasks that involved word-initial voiceless aspirates and labiodental fricatives forward resonance maneuvers  o Improved glottic closure with gentle glottic coup forward resonance exercises    THERAPY PROBES: Improvement was elicited with use of forward resonant stimuli, coordination of respiration and phonation and use of yawn sigh. Coordination between breathing and phonation was mildly incoordinated.        Narrow band image of abducted view of the vocal folds.  Note mild mucus collection near anterior commissure, and posterior commissure hypertrophy (possibly associated with reflux)    The laryngeal exam was reviewed with Mrs. Mcdaniels, and I provided pertinent explanations, as well as written and oral information.    ASSESSMENT / PLAN  IMPRESSIONS: Tessie Mcdaniels is a 55 year old female with a Hx of anxiety, presenting today with Irritable Larynx Syndrome (ILS) (J38.7), Chronic Cough (R05), Globus Sensation (R09.89) and Dysphonia (R49.0) in the context of a Hx of a sinus surgery and severe upper respiratory illness in late 2019, as evidenced by today's evaluation.   Remarkable findings and recommendations of the evaluation included:      Dr. Salazar recommended that she begin a treating with saline rinses and gargles.  She also recommended that Ms. Mcdaniels continue taking Omeprazole.  If her symptoms to not  improve, Dr. Salazar would like to refer her to an allergist.     A course of speech therapy was also recommended.     Therapeutic probes for improving vocal hygiene through gargling were helpful.    STIMULABILITY: results of therapy probes during perceptual and laryngeal evaluation demonstrate improvement with use of forward resonant stimuli, coordination of respiration and phonation, use of yawn sigh and use of rescue breathing strategies      She demonstrates a Good prognosis for improvement given adherence to therapeutic recommendations.     Positive indicators: positive response to therapy probes diagnosis is known to respond to treatment high level of comittment    Negative indicators: none at this time     DURATION / FREQUENCY: 3 biweekly one-hour sessions (if possible). Also a good candidate for wait list.  A total of 6-8 sessions may be necessary.    GOALS:  Patient goal:   To understand the problem and fix it as much as possible  To reduce her cough to acceptable levels  To breathe normally and comfortably in all situations    Long-term goal(s): In 6 months, Ms. Mcdaniels will:  1. Report resolution of symptoms, and use of optimal voice quality and comfort to meet personal, social, and professional needs, 90% of the time during a typical week of vocal activities    This treatment plan was developed with the patient who agreed with the recommendations.    TOTAL SERVICE TIME:   Call Initiated at: 1:04 PM  Call Ended at: 1:51           CPT Billing Codes:   EVALUATION OF VOICE AND RESONANCE (83365)  NO CHARGE FACILITY FEE (86382)      Tessie Robertson M.M. (voice), M.A., CCC/SLP  Speech-Language Pathologist  [facility] Trained Vocologist  [facility]  [phone]  [email or web address]  Pronouns: she/her      *this report was created in part through the use of computerized dictation software, and though reviewed following completion, some typographic errors may persist.  If there is confusion regarding  any of this notes contents, please contact me for clarification      Again, thank you for allowing me to participate in the care of your patient.      Sincerely,    Tessie Robertson, SLP

## 2021-04-15 NOTE — PATIENT INSTRUCTIONS
Tessie Robertson M.M. (voice), M.A., CCC/SLP  Speech-Language Pathologist  [facility] Trained Vocologist  [facility]  [email or web address]  she/her  [email or web address]      You have been referred for functional voice therapy    Why?    Therapy is very useful in treating all voice disorders, regardless of the type of disorder.  If you have:    ? Muscle tension dysphonia: Your voice disorder is caused by abnormal use of the muscles of the vocal mechanism, and functional voice therapy will teach your muscles how to work properly.  ? A growth on your vocal folds (such as nodules, polyps, or cysts): Your lesion may be caused by inappropriate use of the muscles, and therefore can only be cured by learning techniques for better muscle use.  Or, your lesion may cause the muscles to be used incorrectly, and relearning better technique is an important part of overall treatment.  If your lesion needs to be surgically removed, learning to use good muscle technique helps ensure an optimal surgical result.  ? A vocal fold paralysis: Learning to use your muscles to compensate can be very helpful, and may even eliminate the need for surgery.   About muscle tension dysphonia      One of the most common voice disorders we treat is muscle tension dysphonia (MTD).  Dysphonia simply means that the sound of the voice is insufficient for its intended purpose. MTD, however, may also refer to a voice that sounds normal, but causes pain, discomfort, or fatigue to the voice user.  MTD is considered a functional disorder; that is, the muscles do not function properly, causing poor sound, discomfort, or a sensation of increased effort.      There are many possible reasons why use of the vocal mechanism becomes abnormal.  Some general causes are very common:  ? prolonged illness  ? prolonged overuse  ? prolonged underuse (such as after a surgery)  ? trauma, such as an injury, chemical exposure, or emotionally  traumatic event        These can lead to an abnormal muscle response, causing a person to use more effort while talking.  Moreover, the pushing and squeezing can be very subtle, making the individual unaware of the extra effort.  The result may or may not be a stronger voice, but it usually starts a vicious cycle where more and more effort is required.  This cycle can continue for months or even years before the individual becomes aware that his or her voice is abnormal.    Usually, the only treatment available for muscle tension dysphonia is functional therapy.            Basics of functional voice therapy        There are some general things you should know about functional voice therapy.  Functional voice therapy . . .  ? is also known as voice therapy or behavioral voice therapy.  ? should only be done after a thorough evaluation by an ENT (ear, nose, and throat) physician.  ? should be done with a certified speech-language pathologist who specializes in voice disorders.  ? includes education about the use and care of the voice and how the voice works.  ? uses progressive exercises taught over a series of sessions.  ? varies in length from several sessions to many sessions over a few months, but some relief in symptoms is almost always gained within the first 4-6 sessions.  ? may, in the case of emotional stress, need to be accompanied by counseling or stress management.  Functional voice therapy at the [facility]    At the [facility], your functional therapy is done under the direction of Dr. AGATA Aleman or Tessie Robertson.  After a voice evaluation, a treatment plan is discussed with you, and therapy sessions are scheduled.  The plan for therapy varies from person to person, but in general, sessions occur weekly for one hour at first.  Sessions gradually become more spaced apart as you learn more advanced techniques.  After a few sessions, you may need more time to practice and incorporate your  techniques into everyday speech.    The first few sessions generally include a thorough discussion of your vocal lifestyle - voice needs, activities, and habits.  We will teach you how to keep the voice healthy regardless of the level of voice activity.  You will also learn pertinent information about how the voice works, helping you understand the therapeutic process better.  Then, exercises are taught to keep the upper body relaxed while using the voice, and to ensure optimal breathing technique.    At this point, specific voice exercises are taught.  Certain sounds affirm that the muscles are used correctly.  You will learn exercises to achieve these sounds first.  Once these sounds are mastered, you will advance to words, sentences, and finally conversational speech.  During your therapy sessions, exercises will be tailored to your vocal strengths and weaknesses.  During therapy, you will probably find it helpful to record your therapy session on compact disc.  Recording the exercises makes it easier to practice at home.  We encourage you to bring a CD with you to therapy.    Health insurance coverage for functional voice therapy    A normal sounding voice, free from discomfort or fatigue, is considered a normal function.  If it is disordered, restoring it is considered medically necessary.  Most insurance companies recognize this, and therapy is covered under most policies.    Functional voice therapy is considered a form of speech therapy.  If your insurance policy covers rehabilitation services such as physical therapy and speech therapy, therapy for a voice disorder should be covered.  If you have any questions about coverage, or problems with coverage for your voice treatment, please talk to Dr. Aleman or Ms. Robertson.  They can offer you strategies for getting them resolved.    For more information on this topic, visit our web site at [email or web address].      Laryngopharyngeal Reflux Disorder  (LPRD)    Gastroesophageal Reflux Disorder (GERD) is a well known problem in this [place], with advertisements for the many medications used to treat it commonly seen on TV.  GERD occurs when stomach acid makes its way back up the esophagus, causing indigestion, stomach upset, and, especially, heartburn.  The acid may travel all the way up the esophagus, and spill over onto the larynx.  This is especially common during sleep, when the individual is lying down, and acid does not have to fight gravity to move up the esophagus.  When symptoms of acid reflux are more apparent in the larynx or pharynx, then the disorder may be called Laryngopharyngeal Reflux Disorder (LPRD).  Some of the symptoms include:    ? a dry, choking sensation, especially during the night  ? voice quality that is worst in the morning  ? a raw, burning sensation in the throat  ? pain in the throat, neck, or running from the back of the chin along the neck  ? frequent coughing or desire to clear the throat  ? a rough, gritty voice quality  ? decline in voice quality, or comfort, with continued voice use  ? a sour taste in your mouth upon waking up        Interestingly, many of the patients in the [facility] who have laryngeal symptoms (LPRD) do not have classic symptoms of GERD, such as stomach discomfort and heartburn.        In the case of LPRD, when the stomach acid spills over onto the larynx, it irritates the vocal folds and creates inflammation, which causes the vocal folds to vibrate unevenly.  Coughing and throat-clearing from the irritation can make the inflammation worse.  The resulting voice disorder is often related to the poor vibratory quality of the inflamed vocal folds and the muscle tension created by effortful attempts at compensation.        Anti-reflux medication and dietary precautions are the first line of treatment for GERD/LPRD.  Functional voice therapy is useful to teach techniques for reducing effortful  compensation and instruct the individual in improved hygiene.        At the [facility], we do not hand out a list of foods and beverages that must be avoided.  Rather, we educate about types of foods and beverages known to cause reflux, and we will encourage you to systematically investigate which foods stimulate your own reflux.  See the back of this page for dietary and lifestyle precautions for GERD/LPRD.  Also, you are encouraged to manage reflux under the care of a gastrointestinal specialist.        If you are interested in more information about GERD/LPRD, we encourage you to look at the web site of the [facility] at [email or web address] for a more in-depth discussion of GERD/LPRD and the voice.                Lifestyle changes that may help reduce symptoms of GERD/LPRD  ? eat smaller meals more frequently throughout the day, rather than three large meals  ? elevate the head of your bed 2-3 inches (don't just use extra pillows for your head)  ? avoid clothes that fit tightly around the waist  ? avoid lying down within 2-3 hours of eating (don't eat dinner late at night)        Types of foods known to trigger increased stomach acid  ? spicy food (such as chili or jalapeño peppers, Wol[place] or Szechuan spices)  ? acidic foods such as tomato products or citrus products  ? greasy foods  ? caffeine  ? alcohol  ? carbonation  ? roughage, such as popcorn and peanuts, or raw vegetables  ? dairy products   ? strong mint such as peppermint candies  ? chocolate    Reading this list might make you think you can only eat bread and oatmeal for the rest of your life.  Fortunately, most individuals are not triggered by everything on this list.  For example, for every person who is lactose intolerant and has problems with dairy products, there may be someone else whose digestive system is calmed by milk.  Also, in our experience, few persons are triggered by peppermints or  chocolate.  Therefore, we recommend that you experiment with your own dietary habits, changing one food class at a time.  Also, if you have recently started taking anti-reflux medications, you may want to wait for several months, to see how the medication works without any change in your dietary habits.                                                                                                                                                                                                                                             Irritable Larynx Syndrome    What is Irritable Larynx Syndrome?  Irritable Larynx Syndrome (ILS) is a cluster of symptoms not associated with a specific disease process. Individuals with ILS can have any combination of the following complaints:      ; Globus sensation (feeling of lump or some other sensation in the throat)  ; Throat irritation or burning sensation  ; Tightness of throat or neck  ; Chronic cough or throat clearing; sensation of need to clear throat  ; Effort or pain with swallowing  ; Paradoxical vocal fold motion (sensation of difficulty inhaling)  ; Laryngospasm (tightening of throat causing choking or difficulty inhaling)    What causes ILS?  ILS works in the same way as a mosquito bite: We might scratch the bite absentmindedly a couple of times, and suddenly we realize the bite really itches!  So we scratch it vigorously because that feels better for a few moments. In the long run though, scratching actually makes the itch worse.  In the same way, when individuals experience mild irritation in their throats for some reason, they might not realize that they've begun  scratching  the  itch,  (throat clearing) but over time the irritation worsens and becomes more noticeable.  This is how earlier, milder symptoms can be the precursors to more-severe symptoms. Chronic irritation of the mucosa in the laryngeal area can cause changes to the nerve pathways; they can become  hyper-excitable, so that it only takes a small irritation to have a large sensory response (like a cough).  It's nice that the nervous system can learn, but in this case it has backfired!    Here are some possible irritants that can start the chain reaction (most individuals have more than one):  ; Upper respiratory infection with cough  ; Acid Reflux  ; Post Nasal Drainage  ; Allergens (e.g. tree, mold, pollen, pet dander)  ; Cigarette smoke or other kinds of smoke  ; Odors (e.g. perfume, hairspray)  ; Food sensitivities  ; Strong Emotions (e.g. anxiety, stress)  ; Hyperfunction of the muscles of the vocal mechanism  ; Harsh chemicals/  ; Cold Air    Evaluation of ILS  At the [facility], an otolaryngologist and a speech-language pathologist work as a team to determine if ILS is a problem.  Medical evaluation and laryngeal examination rule out any other cause for the symptoms.  Some medical treatment may be advised.  Functional evaluation determines whether there are behavioral or lifestyle factors that are contributing to the symptoms.      Treatment of ILS  Treatment of ILS addresses the cause of irritation. This can include:   ; Treatment of Acid Reflux This may include: Medications (what your MD prescribes), Dietary Precautions (what you eat and what supplements you take), Lifestyle Precautions (when you eat, avoiding environmental irritants), and Mechanical Precautions (how much pressure you put on your lower esophageal sphincter).  ; Functional Speech Therapy with a Speech-Language Pathologist (SLP). Your SLP will educate you about the disorder, help you improve the environment of your mucosa to reduce irritation, improve the movement and function of your larynx, and help reduce muscle tension and restore muscle balance.  ; Further Medical treatment is sometimes used to restore the medical basis for normal function and sensation.  Your MD will discuss these possibilities with you if they are  relevant.      Cough and Throat Clearing  The biological purpose of the larynx is to protect the airway (trachea and lungs). Coughing and throat clearing are mechanisms that are meant to assist in the protection of our airway. When we feel something such as liquid, food, saliva, dust, etc. near our vocal folds, we will clear our throats and cough as a protective reflex. We also cough or throat clear if our airway is irritated.     Why can chronic coughing and throat clearing be harmful?  A cough is produced by squeezing the vocal folds together, building up pressure in the lungs, and then quickly forcing the vocal folds open to clear away whatever might be getting too close to our airway. This can be traumatic to the vocal folds, irritating them in the same way that our hands would be irritated if they were being slapped together over and over. Coughing for a long period of time can cause the mucosa of the larynx and vocal folds to become hypersensitive, making it feel like something is threatening the airway.  The vocal folds need to cough or throat clear even when there isn't an actual physical threat to the airway.  The chronic coughing or throat clearing results in even more coughing or throat clearing.      Strategies to Reduce Irritation  Your speech-language pathologist will teach you techniques to use muscles optimally, in order to reduce irritation.  In the meantime, you can start reducing the irritation, using the following strategies:    ; Identifying your unique trigger; take steps to avoid it  ; Improve both systemic and topical (surface) hydration (dry mucosa is more easily irritated)  ; Drink adequate fluids   ; Use a humidifier, especially in the bedroom at night  ; Guaifenesin (e.g. Mucinex) to thin viscous secretions  ; Sucking on candy, or cough drops without menthol, or chew gum, to increase salivary stimulation  ; Alternative Behaviors to coughing  ; Swallow hard  ; Stimulate your oral cavity:    ; Sip hot, cold, carbonated, or flavored water  ; Suck on ice chips  ; Bite your tongue or cheek (not too hard!)  ; Massage under your chin  ; Gargle  ; Gentle hums or vocal exercises taught to you by your SLP  ; Say  eh eh eh eh  silently (for a gentle, non-irritating throat-clear)  ; Sniff or pursed lip inhale and exhale on  Shhh  like shushing a baby  ; Sniff or pursed lip inhale and exhale on  zzz  like a buzzing bee  ; Wait. While you are waiting, try the above behaviors instead      Saline in the morning and evening, plain water gargle after meals:

## 2021-04-15 NOTE — NURSING NOTE
"Chief Complaint   Patient presents with     RECHECK     Follow up       Pulse 91, temperature 96.9  F (36.1  C), height 1.676 m (5' 6\"), SpO2 95 %.    Joy Brar, EMT  "

## 2021-04-15 NOTE — PROGRESS NOTES
Tessie Mcdaniels is a 55 year old female who is being cared for via a billable virtual visit.        The patient has been notified and verbally consented to the following statements:     This video visit will be conducted between you and your provider.    Patient has opted to conduct today's video visit vs an in-person appointment, and is not able to attend due to possible exposure to COVID-19.      If during the course of the call the provider feels a video visit is not appropriate, you will not be charged for this service.    Provider has received verbal consent for billable virtual visit from the patient? Yes    Preferred method for receiving information: email/ [employer]    Call initiated at: 1:04 PM  Platform used to conduct today's virtual appointment: AM Well video  Location of provider: Residence  Location of patient: [facility] with Dr. Salazar    [facility]  Michel Marley Jr., M.D., F.A.C.S.  Yokasta Dorsey M.D., M.P.H.  Wen Salazar M.D.  Jennifer Aleman, Ph.D., CCC-SLP  Tessie Robertson M.M. (voice), M.A., CCC-SLP  Adrian Ward M.M. (voice), M.A., CCC-SLP  EDMUND Valera (voice), M.S., CCC-SLP       Evaluation report    Clinician: Dr. Salazar  Evaluated in conjunction with: Dr. Wen Salazar  Referring physician:  Martin  Patient: Tessie Mcdaniels  Date of Visit: 4/15/2021    HISTORY  Chief complaint: Tessie Mcdaniels is a 55 year old presenting today for evaluation of throat and globus sensatoin.  Salient history: She has a history significant for Hx of anxiety.    CURRENT SYMPTOMS INCLUDE  VOICE    No significant issues    THROAT ISSUES: primary complaint    Mucus ball in the throat and produces a dark yellow mucus ball in the throat.    Not every morning, but most mornings and there is always some  kind of mucus.     exerting    Has tried: saline gargle (not completely helpful), Flonase, omeprazole.     COUGH:  o Sometimes a dry cough  o Primarily mucus  o her cough triggers include:  - Morning     THROAT  CLEARING:  o Not a significant issues    GLOBUS:  o 6-7/10 today    SWALLOWING    Modified barium swallow study this morning; relatively unremarkable    Has a small hiatal hernia and observed GERD     No significant dysphagia; continuing regular diet    RESPIRATION    Can feel labored at times    Shortness of breath seems random.  She is not sure, but will pay attention in the future.     ADDITIONAL    Reflux: Taking Omeprazole 20 mg     Sinus surgery with Dr. Hampton in November 2019.  3 weeks later, she developed a bad cold, and ever since that time, her symptoms have not been normal    She has not seen an allergist    OTHER PERTINENT HISTORY    Hx of severe anxiety disorder; has had some anxiety over this issue.  Works with her doctor at the [employer]    Mild hearing loss noted during today's appointment    Past Medical History:   Diagnosis Date     Sleep apnea      Past Surgical History:   Procedure Laterality Date     TONSILLECTOMY ADULT Bilateral 11/13/2019    Procedure: Bilateral Tonsillectomy;  Surgeon: Evita Hampton MD;  Location: [facility]     TURBINECTOMY Bilateral 11/13/2019    Procedure: Turbinectomy;  Surgeon: Evita Hampton MD;  [facility]     UVULOPALATOPHARYNGOPLASTY N/A 11/13/2019    Procedure: Uvulopalatopharyngoplasty, bilateral tonsilectomy and bilateral inferior turbinoplasty;  Surgeon: Evita Hampton MD;  Location:  OR       OBJECTIVE  PATIENT REPORTED MEASURES  Patient Supplied Answers To VHI Questionnaire  No flowsheet data found.     Patient Supplied Answers To CSI Questionnaire  No flowsheet data found.    Patient Supplied Answers To EAT Questionnaire  No flowsheet data found.    PERCEPTUAL EVALUATION (CPT 00124)  POSTURE / TENSION:     upper body    neck and shoulders    BREATHING:     appears within normal limits and adequate     clavicular muscle use pattern     phonation is not consistenly coordinated with respiration; experiences random moments of  shortness of breath.    LARYNGEAL PALPATION:     firm musculature    VOICE:    Roughness: Mild Intermittent    Breathiness: WNL    Strain: WNL     Loudness    Conversational speech:  WNL    Projected speech:  N/a    Pitch:    Conversational speech:  Mildly lowered; 203Hz; in clinic 492 Hz    Pitch glide: neurologically normal    MPT: 15 seconds    Resonance:    Conversational speech:  laryngeal pharyngeal resonance    Singing vs. Speech:  n/a    CAPE-V Overall Severity:  10/100    COUGH/THROAT CLEARING:    Not observed, but she described her cough as a moderate, productive cough that is present most mornings    LARYNGEAL FUNCTION STUDIES (CPT 95694)  Recommend to be completed when able to come into clinic.    LARYNGEAL EXAMINATION  Recommend to be completed when able to come into clinic.  Procedure: Flexible endoscopy with chip-tip technology without stroboscopy, right nostril; topical anesthesia with 3% Lidocaine and 0.25% phenylephrine was applied.   Performed by: Dr. Salazar  The laryngeal and pharyngeal structures were evaluated for gross appearance, mobility, function, and focal lesions / abnormalities of the associated mucosa.    All findings were within normal limits with the exception of the following salient features:   This exam shows:    Laryngeal and Vocal Fold Mucosa    Posterior commissure hypertrophy    Minimal presence of thickened secretions on the vocal folds and throughout the laryngeal area    Status of vocal fold mucosa:   o Within normal limits, with no lesions and straight vibratory margins      Narrow Band Imaging yielded the following:  no additional information    Neurological and Functional Integrity of the Larynx    Vocal folds are mobile and meet at midline; movement is brisk and symmetric    Intermittently a subtle tremor was noted at rest; often following a vocal activity  o Intermittently, the tremor was also noted during phonation.    Airway is patent     Elongation of the vocal folds  for pitch increase is WNL    On phonation, glottic closure is acceptable     Supraglottic Function and Therapy Probes    Mild to moderate anterior-posterior constriction of the supraglottic larynx during connected speech    Therapy probes show   o Reduction in supraglottic hyperfunction during tasks that involved word-initial voiceless aspirates and labiodental fricatives forward resonance maneuvers  o Improved glottic closure with gentle glottic coup forward resonance exercises    THERAPY PROBES: Improvement was elicited with use of forward resonant stimuli, coordination of respiration and phonation and use of yawn sigh. Coordination between breathing and phonation was mildly incoordinated.        Narrow band image of abducted view of the vocal folds.  Note mild mucus collection near anterior commissure, and posterior commissure hypertrophy (possibly associated with reflux)    The laryngeal exam was reviewed with Mrs. Mcdaniels, and I provided pertinent explanations, as well as written and oral information.    ASSESSMENT / PLAN  IMPRESSIONS: Tessie Mcdaniels is a 55 year old female with a Hx of anxiety, presenting today with Irritable Larynx Syndrome (ILS) (J38.7), Chronic Cough (R05), Globus Sensation (R09.89) and Dysphonia (R49.0) in the context of a Hx of a sinus surgery and severe upper respiratory illness in late 2019, as evidenced by today's evaluation.   Remarkable findings and recommendations of the evaluation included:      Dr. Salazar recommended that she begin a treating with saline rinses and gargles.  She also recommended that Ms. Mcdaniels continue taking Omeprazole.  If her symptoms to not improve, Dr. Salazar would like to refer her to an allergist.     A course of speech therapy was also recommended.     Therapeutic probes for improving vocal hygiene through gargling were helpful.    STIMULABILITY: results of therapy probes during perceptual and laryngeal evaluation demonstrate improvement with use of forward  resonant stimuli, coordination of respiration and phonation, use of yawn sigh and use of rescue breathing strategies      She demonstrates a Good prognosis for improvement given adherence to therapeutic recommendations.     Positive indicators: positive response to therapy probes diagnosis is known to respond to treatment high level of comittment    Negative indicators: none at this time     DURATION / FREQUENCY: 3 biweekly one-hour sessions (if possible). Also a good candidate for wait list.  A total of 6-8 sessions may be necessary.    GOALS:  Patient goal:   To understand the problem and fix it as much as possible  To reduce her cough to acceptable levels  To breathe normally and comfortably in all situations    Long-term goal(s): In 6 months, Ms. Mcdaniels will:  1. Report resolution of symptoms, and use of optimal voice quality and comfort to meet personal, social, and professional needs, 90% of the time during a typical week of vocal activities    This treatment plan was developed with the patient who agreed with the recommendations.    TOTAL SERVICE TIME:   Call Initiated at: 1:04 PM  Call Ended at: 1:51           CPT Billing Codes:   EVALUATION OF VOICE AND RESONANCE (77664)  NO CHARGE FACILITY FEE (26678)      Tessie Robertson M.M. (voice) MBRYCE., CCC/SLP  Speech-Language Pathologist  [facility] Trained Vocologist  [facility] Voice [facility]  [phone]  [email or web address]  Pronouns: she/her      *this report was created in part through the use of computerized dictation software, and though reviewed following completion, some typographic errors may persist.  If there is confusion regarding any of this notes contents, please contact me for clarification

## 2021-04-19 ENCOUNTER — VIRTUAL VISIT (OUTPATIENT)
Dept: OTOLARYNGOLOGY | Facility: CLINIC | Age: 56
End: 2021-04-19
Payer: COMMERCIAL

## 2021-04-19 DIAGNOSIS — R05.3 CHRONIC COUGH: Primary | ICD-10-CM

## 2021-04-19 DIAGNOSIS — R49.0 DYSPHONIA: ICD-10-CM

## 2021-04-19 DIAGNOSIS — J38.7 IRRITABLE LARYNX: ICD-10-CM

## 2021-04-19 DIAGNOSIS — R09.A2 GLOBUS PHARYNGEUS: ICD-10-CM

## 2021-04-19 PROCEDURE — 99207 PR NO CHARGE LOS: CPT | Performed by: SPEECH-LANGUAGE PATHOLOGIST

## 2021-04-19 PROCEDURE — 92507 TX SP LANG VOICE COMM INDIV: CPT | Mod: GN | Performed by: SPEECH-LANGUAGE PATHOLOGIST

## 2021-04-19 NOTE — PROGRESS NOTES
"Tessie Mcdaniels is a 55 year old female who is being cared for via a billable virtual visit.        The patient has been notified and verbally consented to the following statements:     This video visit will be conducted between you and your provider.    Patient has opted to conduct today's video visit vs an in-person appointment, and is not able to attend due to possible exposure to COVID-19.      If during the course of the call the provider feels a video visit is not appropriate, you will not be charged for this service.    Provider has received verbal consent for billable virtual visit from the patient? Yes    Preferred method for receiving information: E-mail/[employer]    Call initiated at: 8:00 AM  Platform used to conduct today's virtual appointment: AM Well Video  Location of provider: Residence  Location of patient: [facility]  THERAPY NOTE (CPT 79979)  Patient: Tessie Mcdaniels  Date of Service: 4/19/2021  Referring physician: Dr. Wen Salazar  Impressions from most recent evaluation (4/15/21):  \"IMPRESSIONS: Tessie Mcdaniels is a 55 year old female with a Hx of anxiety, presenting today with Irritable Larynx Syndrome (ILS) (J38.7), Chronic Cough (R05), Globus Sensation (R09.89) and Dysphonia (R49.0) in the context of a Hx of a sinus surgery and severe upper respiratory illness in late 2019, as evidenced by today's evaluation.\"    SUBJECTIVE:  Since the last appointment, Ms. Mcdaniels reports the following:     Cough    Globus sensation    OBJECTIVE:  Ms. Mcdaniels presents today with the following:    No cough observed today    PATIENT REPORTED MEASURES:  Patient Supplied Answers To SLP QOL Questionnaire  No flowsheet data found.    THERAPEUTIC ACTIVITIES    Asked many questions about the nature of her symptoms, and I answered all of these thoroughly.  o demonstrated improved technique  o appropriate redirection provided  o instruction provided for increased level of " "complexity/difficulty    Exercises and techniques for optimal vocal hygiene including:    Systemic hydration, including strategies for increasing daily water intake    Topical hydration - Gargling (saline and plain water), saline nasal irrigation, humidification, steam, guaifenesin to reduce the thickened secretions / laryngeal irritation.    Awareness and reduction of phonotraumatic behaviors    Avoiding cough and throat clearing    Exercises to promote optimal respiratory mechanics    I provided explanation of the anatomy and physiology of respiration for speech; she found this to be helpful    She demonstrated excessive upper thoracic engagement during inhalation    She demonstrated clavicular/neck/shoulder involvement in inhalation    Demonstrated difficulty allowing abdominal relaxation for inhalation    Practiced in a supine position on her couch, with tactile cue of a hand on the low rib-cage to facilitate awareness of low respiratory engagement.  Progressed to seated today.    With clinician support, patient was able to demonstrate improved abdominal relaxation and engagement on inhalation    Optimal exhalation using inward engagement of the abdominal wall with no corresponding collapse of the upper chest cavity was trained using the \"sh\" task    Fair accuracy with clinician support    Acceptable improvement in airflow and respiratory mechanics    Semi-Occluded Vocal Tract (SOVT) exercises instructed to reduce laryngeal tension, promote vocal fold pliability, and coordinate respiration and phonation    Sustained /z/ or humming were found to be most facilitating     Sustained phonation, and voice vs. voiceless productions used to promote easy voicing and raise awareness of laryngeal tension    Ascending and descending glides utilized to promote vocal fold pliability    \"Messa di voce\", gradual crescendo and decrescendo to vary medial compression was also utilized to promote vocal fold " pliability.    Progressed to humming a tune; she demonstrated mild difficulty, as she is not a rae.    Instructed on the benefits of using these exercises for improved coordination of breath flow with phonation and tissue mobilization.    Instructed on the importance of using these exercises as a warm-up / cool down,  and to re-calibrate the voice throughout the day.    Counseling and Education:    Asked many questions about the nature of her symptoms, and I answered all of these thoroughly.    I provided handouts of today's therapeutic activities to facilitate practice.    ASSESSMENT/PLAN  PROGRESS TOWARD LONG TERM GOALS:   Adequate progress; too early for objective measures    IMPRESSIONS: Irritable Larynx Syndrome (ILS) (J38.7), Chronic Cough (R05), Globus Sensation (R09.89) and Dysphonia (R49.0) in the context of a Hx of a sinus surgery and severe upper respiratory illness in late 2019. Ms. Mcdaniels learned cough suppression techniques, as well as respiration and phonation coordination.    PLAN: I will see Ms. Mcdaniels in two weeks, at which point we will continue therapy.   For practice goals see AVS.     TOTAL SERVICE TIME:   Call Initiated at: 8:00 AM  Call Ended at: 9:00 AM           CPT Billing Codes:   TREATMENT (90855)  NO CHARGE FACILITY FEE (72053)    Tessie Robertson M.M. (voice), M.A., CCC/SLP  Speech-Language Pathologist  [facility] Trained Vocologist  [facility]  [phone]  [email or web address]  Pronouns: she/her      *this report was created in part through the use of computerized dictation software, and though reviewed following completion, some typographic errors may persist.  If there is confusion regarding any of this notes contents, please contact me for clarification

## 2021-04-19 NOTE — PROGRESS NOTES
"Tessie Mcdaniels is a 55 year old female who is being cared for via a billable virtual visit.        The patient has been notified and verbally consented to the following statements:     This video visit will be conducted between you and your provider.    Patient has opted to conduct today's video visit vs an in-person appointment, and is not able to attend due to possible exposure to COVID-19.      If during the course of the call the provider feels a video visit is not appropriate, you will not be charged for this service.    Provider has received verbal consent for billable virtual visit from the patient? Yes    Preferred method for receiving information: email    Call initiated at: 8:00 AM  Platform used to conduct today's virtual appointment: AM Well Video  Location of provider: Residence  Location of patient: [facility]  THERAPY NOTE (CPT 19199)  Patient: Tessie Mcdaniels  Date of Service: 4/19/2021  Referring physician: Dr. Wen Salazar  Impressions from most recent evaluation (***):  \"***    I had the pleasure of seeing Ms. Mcdaniels today, for the ***  therapy session (CPT code 60726).  She was referred by {PROVIDERS-P-ENT:[id number]}, for medically necessary speech therapy to address a diagnosis of:  {[facility] ENT-ICD-10 CODES:411267}   {[facility] ENT-ICD-10 CODES:639542}.  Please refer to her initial evaluation reports from *** for complete details regarding diagnostic findings.    SUBJECTIVE:  Since the last appointment, Ms. Mcdaniels reports the following:     Overall she reports that symptoms are stable; this is her initial therapy appointment    Successes: ***    Hurdles:  ***    ***irregular regular practice with and without the recording    OBJECTIVE:  Ms. Mcdaniels presents today with the following:    ***  Voice quality:    ***    A pitch glide task today showed that Ms. Mcdaniels can vocalize down to *** and up to ***.  This represents an improvement of *** over the last session.  ***THROAT " "ISSUES    ***     COUGH:  o ***  o her cough triggers include:  - ***    THROAT CLEARING:  o ***  o her throat clearing triggers include:  - ***    GLOBUS:  o ***      PATIENT REPORTED MEASURES:  Patient Supplied Answers To SLP QOL Questionnaire  No flowsheet data found.    THERAPEUTIC ACTIVITIES    ***Asked many questions about the nature of her symptoms, and I answered all of these thoroughly.    ***Morley concepts of post-operative voice use and care, to optimize healing.    Demonstrated previous exercises.  o demonstrated improved technique  o appropriate redirection provided  o instruction provided for increased level of complexity/difficulty      ***  ***  ***  ***  ***  ***  ***  ***    Counseling and Education:    ***Asked many questions about the nature of her symptoms, and I answered all of these thoroughly.    ***      ***A revised regimen for home practice was instructed.    I provided ***an audio recording and handouts of today's therapeutic activities to facilitate practice.      ASSESSMENT/PLAN  PROGRESS TOWARD LONG TERM GOALS:   {LVC Treatment Progress:799021}    IMPRESSIONS: ***{[facility] ENT-ICD-10 CODES:629408} in the context of {[facility] ENT-ICD-10 CODES:376792}. Ms. Mcdaniels ***    PLAN: I will see Ms. Mcdaniels in *** weeks, at which point we will ***.   For practice goals see AVS.     Certification: ***    TOTAL SERVICE TIME:   Call Initiated at: 8:37 AM  Call Ended at: ***           CPT Billing Codes:   {LOS:854452::\"NO CHARGE FACILITY FEE (23008)\"}    Tessie Robertson M.M. (voice), M.A., CCC/SLP  Speech-Language Pathologist  [facility] Trained Vocologist  [facility]  [phone]  [email or web address]  Pronouns: she/her      *this report was created in part through the use of computerized dictation software, and though reviewed following completion, some typographic errors may persist.  If there is confusion regarding any of this notes contents, please contact me for " clarification

## 2021-04-19 NOTE — LETTER
"4/19/2021       RE: Tessie Mcdaniels  [address]  [facility] [address]     Dear Colleague,    Thank you for referring your patient, Tessie Mcdaniels, to the [facility] VOICE CLINIC [place] at [facility]. Please see a copy of my visit note below.    Tessie Mcdaniels is a 55 year old female who is being cared for via a billable virtual visit.        The patient has been notified and verbally consented to the following statements:     This video visit will be conducted between you and your provider.    Patient has opted to conduct today's video visit vs an in-person appointment, and is not able to attend due to possible exposure to COVID-19.      If during the course of the call the provider feels a video visit is not appropriate, you will not be charged for this service.    Provider has received verbal consent for billable virtual visit from the patient? Yes    Preferred method for receiving information: E-mail/[employer]    Call initiated at: 8:00 AM  Platform used to conduct today's virtual appointment: AM Well Video  Location of provider: Residence  Location of patient: [facility] VOICE [facility]  THERAPY NOTE (CPT 57644)  Patient: Tessie Mcdaniels  Date of Service: 4/19/2021  Referring physician: Dr. Wen Salazar  Impressions from most recent evaluation (4/15/21):  \"IMPRESSIONS: Tessie Mcdaniels is a 55 year old female with a Hx of anxiety, presenting today with Irritable Larynx Syndrome (ILS) (J38.7), Chronic Cough (R05), Globus Sensation (R09.89) and Dysphonia (R49.0) in the context of a Hx of a sinus surgery and severe upper respiratory illness in late 2019, as evidenced by today's evaluation.\"    SUBJECTIVE:  Since the last appointment, Ms. Mcdaniels reports the following:     Cough    Globus sensation    OBJECTIVE:  Ms. Mcdaniels presents today with the following:    No cough observed today    PATIENT REPORTED MEASURES:  Patient Supplied Answers To " "SLP QOL Questionnaire  No flowsheet data found.    THERAPEUTIC ACTIVITIES    Asked many questions about the nature of her symptoms, and I answered all of these thoroughly.  o demonstrated improved technique  o appropriate redirection provided  o instruction provided for increased level of complexity/difficulty    Exercises and techniques for optimal vocal hygiene including:    Systemic hydration, including strategies for increasing daily water intake    Topical hydration - Gargling (saline and plain water), saline nasal irrigation, humidification, steam, guaifenesin to reduce the thickened secretions / laryngeal irritation.    Awareness and reduction of phonotraumatic behaviors    Avoiding cough and throat clearing    Exercises to promote optimal respiratory mechanics    I provided explanation of the anatomy and physiology of respiration for speech; she found this to be helpful    She demonstrated excessive upper thoracic engagement during inhalation    She demonstrated clavicular/neck/shoulder involvement in inhalation    Demonstrated difficulty allowing abdominal relaxation for inhalation    Practiced in a supine position on her couch, with tactile cue of a hand on the low rib-cage to facilitate awareness of low respiratory engagement.  Progressed to seated today.    With clinician support, patient was able to demonstrate improved abdominal relaxation and engagement on inhalation    Optimal exhalation using inward engagement of the abdominal wall with no corresponding collapse of the upper chest cavity was trained using the \"sh\" task    Fair accuracy with clinician support    Acceptable improvement in airflow and respiratory mechanics    Semi-Occluded Vocal Tract (SOVT) exercises instructed to reduce laryngeal tension, promote vocal fold pliability, and coordinate respiration and phonation    Sustained /z/ or humming were found to be most facilitating     Sustained phonation, and voice vs. voiceless productions " "used to promote easy voicing and raise awareness of laryngeal tension    Ascending and descending glides utilized to promote vocal fold pliability    \"Messa di voce\", gradual crescendo and decrescendo to vary medial compression was also utilized to promote vocal fold pliability.    Progressed to humming a tune; she demonstrated mild difficulty, as she is not a rae.    Instructed on the benefits of using these exercises for improved coordination of breath flow with phonation and tissue mobilization.    Instructed on the importance of using these exercises as a warm-up / cool down,  and to re-calibrate the voice throughout the day.    Counseling and Education:    Asked many questions about the nature of her symptoms, and I answered all of these thoroughly.    I provided handouts of today's therapeutic activities to facilitate practice.    ASSESSMENT/PLAN  PROGRESS TOWARD LONG TERM GOALS:   Adequate progress; too early for objective measures    IMPRESSIONS: Irritable Larynx Syndrome (ILS) (J38.7), Chronic Cough (R05), Globus Sensation (R09.89) and Dysphonia (R49.0) in the context of a Hx of a sinus surgery and severe upper respiratory illness in late 2019. Ms. Mcdaniels learned cough suppression techniques, as well as respiration and phonation coordination.    PLAN: I will see Ms. Mcdaniels in two weeks, at which point we will continue therapy.   For practice goals see AVS.     TOTAL SERVICE TIME:   Call Initiated at: 8:00 AM  Call Ended at: 9:00 AM           CPT Billing Codes:   TREATMENT (97571)  NO CHARGE FACILITY FEE (34757)    Tessie Robertson M.M. (voice), M.A., CCC/SLP  Speech-Language Pathologist  [facility] Trained Vocologist  [facility]  [phone]  [email or web address]  Pronouns: she/her      *this report was created in part through the use of computerized dictation software, and though reviewed following completion, some typographic errors may persist.  If there is confusion regarding any of " this notes contents, please contact me for clarification

## 2021-04-19 NOTE — PATIENT INSTRUCTIONS
"After Visit Summary    Patient: Tessie Mcdaniels  Date of Visit: 4/19/2021    Today' s Plan:    ILS handout:  o Cough suppression strategies  o Why suppress the morning cough? Because the trauma can contribute to the globus sensation (lump in the throat feeling) and adds a lot of laryngeal hypersensitivity.    Vocal Health Strategies:     Systemic Hydration (internal hydration of the entire body):  - Sip liquids throughout the day, rather than consume a large volume of liquid at one time.      Topical Hydration (hydration for the surface mucosa of the larynx and vocal folds):    Please follow strategies learned on the \"Tips for Topical Hydration\" handout  o Nasal Irrigation/Nasal Spray (continue)    Sinus rinse bottle (nasal irrigation) - Morning and night (if there is fluid left gargle it)  o Gargling    Plain water in the shower, after brushing teeth, and after every meal    (optional) using saline  o Reflux    Continue anti-reflux medications      Breathing:   In the morning and evening (twice daily) for 2-5 minutes:   Breathe while lying on your back with your face and knees up. Hands on tummy and chest.  Take a breath in with rounded lips and exhale with a  shhhh    Inhale  = Inflate; exhale = deflate  Throughout the day (2-3x/day for just a couple minutes) check breathing while keeping shoulders relaxed (riding to and from school, etc.)     Breathing Tips:  Keep shoulders down and chest relaxed  Try arms behind the back while practicing to anchor the shoulders and chest.    Voice (2-3x/day unless otherwise noted):    Semi-occluded vocal tract exercise:   o Sustained Zzzz or Hum  3-4x/day for 30-60 seconds:  o 3x: sustained (comfortable pitch)  o 3x:  gliding up and down             Up and down like a sine wave  o 3x: blow bubbles on a sustained/ varied pitch soft to loud to soft (messa di voce)    o 1-2x: Happy birthday bubbles (keep connected)  These exercises are great for:    *Instructed on the importance of " using these exercises as a warm-up / cool down,  and to re-calibrate the voice throughout the day.    *tissue mobilization exercise - Improving the condition and pliability of the vocal folds.    *Abdominal breathing and applying optimal breath flow to speech/singing.    Noe Mclean (example of straw phonation with water resistance:   https://www.google.com/search?q=noe+april+straws&oq=noe&aqs=chrome.[email or web address]&sourceid=pjleander&ie=UTF-8       Tessie Robertson M.M. (voice), M.A., CCC/SLP  Speech-Language Pathologist  Certificate of Vocology  [facility]  [phone]  [email or web address]  Pronouns: she/her

## 2021-04-21 ENCOUNTER — MYC MEDICAL ADVICE (OUTPATIENT)
Dept: OTOLARYNGOLOGY | Facility: CLINIC | Age: 56
End: 2021-04-21

## 2021-04-22 NOTE — TELEPHONE ENCOUNTER
LVM for patient requesting that she sign into [employer] to read the message that Dr Salazar had sent her. Left call back number for patient to return call if she is having any issues with [employer] or has any other questions or concerns.    Joy Brar, EMT

## 2021-04-29 ENCOUNTER — PRE VISIT (OUTPATIENT)
Dept: GASTROENTEROLOGY | Facility: CLINIC | Age: 56
End: 2021-04-29

## 2021-04-29 ENCOUNTER — VIRTUAL VISIT (OUTPATIENT)
Dept: GASTROENTEROLOGY | Facility: CLINIC | Age: 56
End: 2021-04-29
Attending: OTOLARYNGOLOGY
Payer: COMMERCIAL

## 2021-04-29 VITALS — WEIGHT: 190 LBS | HEIGHT: 66 IN | BODY MASS INDEX: 30.53 KG/M2

## 2021-04-29 DIAGNOSIS — R09.A2 GLOBUS SENSATION: Primary | ICD-10-CM

## 2021-04-29 DIAGNOSIS — K21.9 LPRD (LARYNGOPHARYNGEAL REFLUX DISEASE): ICD-10-CM

## 2021-04-29 DIAGNOSIS — G47.33 OSA (OBSTRUCTIVE SLEEP APNEA): ICD-10-CM

## 2021-04-29 DIAGNOSIS — R12 HEARTBURN: ICD-10-CM

## 2021-04-29 PROCEDURE — 99205 OFFICE O/P NEW HI 60 MIN: CPT | Mod: 95 | Performed by: INTERNAL MEDICINE

## 2021-04-29 RX ORDER — OMEPRAZOLE 40 MG/1
40 CAPSULE, DELAYED RELEASE ORAL 2 TIMES DAILY
Qty: 180 CAPSULE | Refills: 3 | Status: SHIPPED | OUTPATIENT
Start: 2021-04-29 | End: 2022-04-29

## 2021-04-29 ASSESSMENT — PAIN SCALES - GENERAL: PAINLEVEL: NO PAIN (0)

## 2021-04-29 ASSESSMENT — MIFFLIN-ST. JEOR: SCORE: 1463.58

## 2021-04-29 NOTE — PROGRESS NOTES
Gastroenterology Visit for: Tessie Mcdaniels 1965   MRN: [id number]     Reason for Visit:  chief complaint    Referred by: Martin  / [address] [facility] [id number]  Patient Care Team:  Jammie Ramos DO as PCP - General (Family Practice)  Evita Hampton MD as Assigned Surgical Provider    History of Present Illness:   Tessie Mcdaniels is a 55 year old female with PMH of IBS, diverticulitis, anxiety, depression, and obstructive sleep apnea s/p uvulopalatopharyngoplasty and turbinate surgery who is presenting as a new patient in consultation at the request of Dr. Salazar with a chief complaint of globus sensation.  ---------------------------------------------------------------  Tessie Mcdaniels states that every morning wakes up with lump of phlegm in throat. This started after she had a cold 3-4 weeks after UPPP procedure for her sleep apnea. It has been stable since January of 2020. She coughs up a lump of phlegm in the morning, but feels throughout the day that there is a lump in her throat. There is nothing to cough up. ENT performed laryngoscopy and video barium study, and they told her her throat looked fine but they found reflux on esophagram. The doctor thinks this is possibly the cause of her symptoms, but she is confused because she does have phlegm in the mornings. She reports that she has been instructed to try gargling salt water, but she would rather just cough the phlegm up.     She feels like a lump all day, including between meals. She gestures to anterior neck midline near thyroid to describe the location of the lump sensation. It is not affected by eating, and she has no swallowing difficulties. She thinks she may have some excess saliva production throughout day. No regurgitation at night, no heartburn symptoms except after large meals and immediately lying down. She endorses vomiting/gagging 2-3 times per week during panic attacks. Abdominal cramping 3-4 times a week, which she  associates with IBS. Alternating constipation/diarrhea throughout week. Dry air is mildly provocative, and she has had mild symptom improvement with omeprazole 20mg/day.    Ms. Mcdaniels says that this lump in her throat is bothersome and she would like to stop experiencing it, as well as the phlegm production.    She has been on gabapentin previously but did not tolerate it well.       ---------------------------------------------------------------     Tessie Mcdaniels denies dysphagia, odynophagia, heartburn/reflux, nausea, early satiety, abdominal pain, abdominal distension/bloating, hematochezia, or melena. No unintentional weight loss.     Family history of colon cancer: No  Wt Readings from Last 5 Encounters:   04/29/21 86.2 kg (190 lb)   03/09/21 86.2 kg (190 lb)   12/31/20 89.8 kg (198 lb)   01/21/20 88 kg (194 lb)   12/19/19 85.7 kg (189 lb)        Esophageal Questionnaire(s)    BEDQ Questionnaire  No flowsheet data found.  No flowsheet data found.    Eckardt Questionnaire  No flowsheet data found.    Promis 10 Questionnaire  No flowsheet data found.        STUDIES & PROCEDURES:    EGD:   Date:  Impression:  Pathology Report:    Colonoscopy: 03/13/2014   Date:  Impression:  Pathology Report:     EndoFLIP directed at the UES or LES (8cm (EF-325) balloon length or 16cm (EF-322) balloon length):   Date:  8cm balloon  Balloon inflation Balloon pressure CSA (mm^2) DI (mm^2/mmHg) Dmin (mm) Compliance   20 (ladmark ID)        30        40        50           16cm balloon  Balloon inflation Balloon pressure CSA (mm^2) DI (mm^2/mmHg) Dmin (mm) Compliance   30 (ladmark ID)        40        50        60        70           High Resolution Manometry:  Date:  Impression:    PH/Impedance:  Date:  Impression:     Bravo:  48 or 96hr  Date:  Impression:    CT:  Date:  Impression:    Esophagram:  Date: 04/15/2021                                                                Impression:  1. No penetration or aspiration. Please  see the speech pathologist  report for further details.  2. Gastroesophageal reflux.     Prior medical records were reviewed including, but not limited to, notes from referring providers, lab work, radiographic tests, and other diagnostic tests. Pertinent results were summarized above.     History     Past Medical History:   Diagnosis Date     Sleep apnea        Past Surgical History:   Procedure Laterality Date     TONSILLECTOMY ADULT Bilateral 11/13/2019    Procedure: Bilateral Tonsillectomy;  Surgeon: Evita Hampton MD;  Location: [facility]     TURBINECTOMY Bilateral 11/13/2019    Procedure: Turbinectomy;  Surgeon: Evita Hampton MD;  Location:  OR     UVULOPALATOPHARYNGOPLASTY N/A 11/13/2019    Procedure: Uvulopalatopharyngoplasty, bilateral tonsilectomy and bilateral inferior turbinoplasty;  Surgeon: Evita Hampton MD;  Location:  OR       Social History     Socioeconomic History     Marital status:      Spouse name: Not on file     Number of children: Not on file     Years of education: Not on file     Highest education level: Not on file   Occupational History     Not on file   Social Needs     Financial resource strain: Not on file     Food insecurity     Worry: Not on file     Inability: Not on file     Transportation needs     Medical: Not on file     Non-medical: Not on file   Tobacco Use     Smoking status: Former Smoker     Packs/day: 0.00     Smokeless tobacco: Never Used     Tobacco comment: quit 5 years ago   Substance and Sexual Activity     Alcohol use: Never     Frequency: Never     Drug use: Not on file     Sexual activity: Not on file   Lifestyle     Physical activity     Days per week: Not on file     Minutes per session: Not on file     Stress: Not on file   Relationships     Social connections     Talks on phone: Not on file     Gets together: Not on file     Attends [employer] service: Not on file     Active member of club or organization: Not on file      Attends meetings of clubs or organizations: Not on file     Relationship status: Not on file     Intimate partner violence     Fear of current or ex partner: Not on file     Emotionally abused: Not on file     Physically abused: Not on file     Forced sexual activity: Not on file   Other Topics Concern     Not on file   Social History Narrative     Not on file       No family history on file.    Medications and Allergies:     Outpatient Encounter Medications as of 4/29/2021   Medication Sig Dispense Refill     albuterol (PROVENTIL HFA) 108 (90 Base) MCG/ACT inhaler Inhale 2 puffs into the lungs       ALPRAZolam (XANAX PO) Take 1 mg by mouth as needed for anxiety        DULoxetine (CYMBALTA) 60 MG capsule Take 120 mg by mouth       estradiol (ESTRACE) 0.1 MG/GM vaginal cream Place 1 g vaginally       fluticasone (FLONASE) 50 MCG/ACT nasal spray Spray 2 sprays into both nostrils 2 times daily 11.1 mL 3     fluticasone (FLONASE) 50 MCG/ACT nasal spray Spray 1 spray in nostril       meloxicam (MOBIC) 7.5 MG tablet Take 7.5 mg by mouth daily       omeprazole (PRILOSEC) 40 MG DR capsule Take 1 capsule (40 mg) by mouth 2 times daily 180 capsule 3     pravastatin (PRAVACHOL) 40 MG tablet   1     traMADol (ULTRAM) 50 MG tablet Take 50 mg by mouth       TRAZODONE HCL PO Take 100 mg by mouth At Bedtime        triamcinolone (KENALOG) 0.1 % external cream        FLUoxetine HCl (PROZAC PO) Take 20 mg by mouth daily        lamoTRIgine (LAMICTAL) 25 MG chewable tablet Take 2 tablets (50 mg) by mouth daily for 14 days 28 tablet 0     meloxicam (MOBIC) 15 MG tablet Take 1 tablet (15 mg) by mouth daily (Patient not taking: Reported on 12/31/2020) 10 tablet 0     predniSONE (DELTASONE) 20 MG tablet Take 1 tablet (20 mg) by mouth daily Start on the third day after surgery (Patient not taking: Reported on 12/31/2020) 5 tablet 0     No facility-administered encounter medications on file as of 4/29/2021.         Allergies   Allergen  "Reactions     Diflucan [Fluconazole]      Mouth sores     Sulfa Drugs Nausea and Vomiting        Review of systems:  A full 10 point review of systems was obtained and was negative except for the pertinent positives and negatives stated within the HPI.    Objective Findings:   Physical Exam:    Constitutional: Ht 1.676 m (5' 6\")   Wt 86.2 kg (190 lb)   BMI 30.67 kg/m    General: Alert, cooperative, no distress, well-appearing  Head: Atraumatic, normocephalic, no obvious abnormalities   Eyes: EOMI, Sclera anicteric, no obvious conjunctival hemorrhage   Nose: Nares normal, no obvious malformation, no obvious rhinorrhea   Respiratory: normal appearing respirations  Musculoskeletal: Range of motion intact, no obvious strength deficit  Skin: No jaundice, no obvious rash  Neurologic: AAOx3, no obvious neurologic abnormality  Psychiatric: Normal Affect, appropriate mood  Extremities: No obvious edema, no obvious malformation     Labs, Radiology, Pathology     No results found for: WBC, HGB, PLT, CHOL, TRIG, HDL, ALT, AST, NA, CREATININE, BUN, CO2, TSH, INR, GLUF     Liver Function Studies - No results for input(s): PROTTOTAL, ALBUMIN, BILITOTAL, ALKPHOS, AST, ALT, BILIDIRECT in the last 52244 hours.       Patient Active Problem List    Diagnosis Date Noted     LPRD (laryngopharyngeal reflux disease) 04/29/2021     Priority: Medium     Globus sensation 04/29/2021     Priority: Medium     Heartburn 04/29/2021     Priority: Medium     Obstructive sleep apnea 11/15/2019     Priority: Medium     NEERU (obstructive sleep apnea) 11/13/2019     Priority: Medium     Major depressive disorder, recurrent episode, severe (H) 08/08/2014     Priority: Medium     updating diagnosis code for icd10 cutover        Assessment and Plan   Assessment:    Tessie Mcdaniels is a 55 year old female with PMH of IBS, diverticulitis, anxiety, depression, and obstructive sleep apnea s/p uvulopalatopharyngoplasty and turbinate surgery who is presenting " as a new patient in consultation at the request of Dr. Salazar with a chief complaint of globus sensation.    1. Globus sensation  This may be related to the phlegm production which she experiences every morning. It is not associated with symptoms of dysphagia and it is not affected by meals. It is possible that these symptoms are related to underlying reflux disease, which bears further investigation. A therapeutic trial of a higher PPI dose for two months while monitoring symptoms will help to establish whether acid reflux is part of the etiology. After this trial, an EGD should be performed due to new probable reflux-related symptoms in a 55-year-old. If the PPI trial does not help with the symptoms, a BRAVO should be placed at the same time for OFF-therapy monitoring. Beyond acid or non-acid reflux, the differential includes increased sensitivity following her uvulopalatopharyngoplasty surgery. Any structural causes of the sensation are largely ruled out by the workup by ENT including the esophagram. Finally, this sensation could be due to increased awareness related to anxiety with panic attacks, although this does not explain the phlegm production in the mornings.      1. Globus sensation    2. LPRD (laryngopharyngeal reflux disease)    3. Heartburn    4. NEERU (obstructive sleep apnea)       No orders of the defined types were placed in this encounter.    Plan:  1. Please increase your omeprazole to 40mg twice daily 30-60 minutes before breakfast and dinner.   2. Monitor for improvement in your symptoms  3. Arrange 2 month follow up  4. Endoscopy with or without bravo (wireless acid reflux monitoring test) will be decided at that time based on response to the omeprazole.        Follow up plan:   Return to clinic 2 months and as needed.    The risks and benefits of my recommendations, as well as other treatment options were discussed with the patient and any available family today. All questions were answered.      o Follow up: As planned above. Today, I personally spent 76 minutes in direct face to face time with the patient, of which greater than 50% of the time was spent in patient education and counseling as described above. Approximately 15 minutes were spent on indirect care associated with the patient's consultation including but not limited to review of: patient medical records to date, clinic visits, hospital records, lab results, imaging studies, procedural documentation, and coordinating care with other providers. The findings from this review are summarized in the above note.    The patient verbalized understanding of the plan and was appreciative for the time spent and information provided during the office visit.     Attending Attestation:    I was present with the medical student who participated in the service and in the documentation of the note. I  have verified the history and personally performed the physical exam and medical decision making. I agree with the  assessment and plan of care as documented in the note. I saw and evaluated the patient and agree with the findings and plan of care as documented in the note.    In summary, the patient is an 55 year old female with a history of IBS, diverticulitis, anxiety, depression, and obstructive sleep apnea s/p uvulopalatopharyngoplasty and turbinate surgery who is presenting as a new patient in consultation at the request of Dr. Salazar with a chief complaint of globus sensation.    The patient was seen in video telemedicine consultation regarding her symptoms of globus. She is currently on omeprazole 20mg daily and is uncertain if this is helping with her globus. It does appear to be helping with reflux symptoms which she gets following a large meal and lying down shortly thereafter. IT is possible that her phlegm and throat clearing is related to reflux and we discussed increasing her omeprazole to 40mg BID. If she has a therapeutic response we will lower  to the lowest effective dose. She does warrant an endoscopy to assess her esophagus as well however that will be decided based on response to PPI. The rationale for waiting to complete a PPI trial is that if she does not have an adequate response we will discuss performing a Bravo OFF therapy at the time of endoscopy to assess for acid reflux disease. She does NOT have a nickel allergy. Another possible explanation is that she has developed a hypersensitivity/hypervigilance syndrome following surgery. Neuromodulation adjusting may be beneficial following aggressive treatment for presumed reflux.     Overall time spent discussing, thinking, reviewing the chart including available imaging and labs, and evaluating the patient was 76 minutes.    Patient was seen April 29, 2021    Jhon Chavez DO   of Medicine  Division of Gastroenterology, Hepatology, and Nutrition  [facility]    Author:   Jhon Chavez DO   of Medicine  Division of Gastroenterology, Hepatology, and Nutrition  [facility]     Documentation assisted by voice recognition and documentation system.

## 2021-04-29 NOTE — NURSING NOTE
"Chief Complaint   Patient presents with     New Patient     GERD, LPRD       Vitals:    04/29/21 1433   Weight: 86.2 kg (190 lb)   Height: 1.676 m (5' 6\")       Body mass index is 30.67 kg/m .      Raul Quiroz LPN                        "

## 2021-04-29 NOTE — PROGRESS NOTES
Tessie is a 55 year old who is being evaluated via a billable video visit.      Please send link invite to:  [email or web address]  How would you like to obtain your AVS? MyChart  If the video visit is dropped, the invitation should be resent by: Text to cell phone: [phone]  Will anyone else be joining your video visit? No      Video Start Time: 230  Video-Visit Details    Type of service:  Video Visit    Video End Time:346    Originating Location (pt. Location): Home    Distant Location (provider location):  [facility] [place]     Platform used for Video Visit: [employer]

## 2021-04-29 NOTE — LETTER
4/29/2021         RE: Tessie Mcdaniels  [address]  [facility] [address]        Dear Colleague,    Thank you for referring your patient, Tessie Mcdaniels, to the [facility] [place]. Please see a copy of my visit note below.    Gastroenterology Visit for: Tessie Mcdaniels 1965   MRN: [id number]     Reason for Visit:  chief complaint    Referred by: Martin  / [facility] / [facility] [id number]  Patient Care Team:  Jammie Ramos DO as PCP - General (Family Practice)  Evita Hampton MD as Assigned Surgical Provider    History of Present Illness:   Tessie Mcdaniels is a 55 year old female with PMH of IBS, diverticulitis, anxiety, depression, and obstructive sleep apnea s/p uvulopalatopharyngoplasty and turbinate surgery who is presenting as a new patient in consultation at the request of Dr. Salazar with a chief complaint of globus sensation.  ---------------------------------------------------------------  Tessie Mcdaniels states that every morning wakes up with lump of phlegm in throat. This started after she had a cold 3-4 weeks after UPPP procedure for her sleep apnea. It has been stable since January of 2020. She coughs up a lump of phlegm in the morning, but feels throughout the day that there is a lump in her throat. There is nothing to cough up. ENT performed laryngoscopy and video barium study, and they told her her throat looked fine but they found reflux on esophagram. The doctor thinks this is possibly the cause of her symptoms, but she is confused because she does have phlegm in the mornings. She reports that she has been instructed to try gargling salt water, but she would rather just cough the phlegm up.     She feels like a lump all day, including between meals. She gestures to anterior neck midline near thyroid to describe the location of the lump sensation. It is not affected by eating, and she has no swallowing difficulties. She thinks she  may have some excess saliva production throughout day. No regurgitation at night, no heartburn symptoms except after large meals and immediately lying down. She endorses vomiting/gagging 2-3 times per week during panic attacks. Abdominal cramping 3-4 times a week, which she associates with IBS. Alternating constipation/diarrhea throughout week. Dry air is mildly provocative, and she has had mild symptom improvement with omeprazole 20mg/day.    Ms. Mcdaniels says that this lump in her throat is bothersome and she would like to stop experiencing it, as well as the phlegm production.    She has been on gabapentin previously but did not tolerate it well.       ---------------------------------------------------------------     Tessie Mcdaniels denies dysphagia, odynophagia, heartburn/reflux, nausea, early satiety, abdominal pain, abdominal distension/bloating, hematochezia, or melena. No unintentional weight loss.     Family history of colon cancer: No  Wt Readings from Last 5 Encounters:   04/29/21 86.2 kg (190 lb)   03/09/21 86.2 kg (190 lb)   12/31/20 89.8 kg (198 lb)   01/21/20 88 kg (194 lb)   12/19/19 85.7 kg (189 lb)        Esophageal Questionnaire(s)    BEDQ Questionnaire  No flowsheet data found.  No flowsheet data found.    Eckardt Questionnaire  No flowsheet data found.    Promis 10 Questionnaire  No flowsheet data found.        STUDIES & PROCEDURES:    EGD:   Date:  Impression:  Pathology Report:    Colonoscopy: 03/13/2014   Date:  Impression:  Pathology Report:     EndoFLIP directed at the UES or LES (8cm (EF-325) balloon length or 16cm (EF-322) balloon length):   Date:  8cm balloon  Balloon inflation Balloon pressure CSA (mm^2) DI (mm^2/mmHg) Dmin (mm) Compliance   20 (ladmark ID)        30        40        50           16cm balloon  Balloon inflation Balloon pressure CSA (mm^2) DI (mm^2/mmHg) Dmin (mm) Compliance   30 (ladmark ID)        40        50        60        70           High Resolution  Manometry:  Date:  Impression:    PH/Impedance:  Date:  Impression:     Bravo:  48 or 96hr  Date:  Impression:    CT:  Date:  Impression:    Esophagram:  Date: 04/15/2021                                                                Impression:  1. No penetration or aspiration. Please see the speech pathologist  report for further details.  2. Gastroesophageal reflux.     Prior medical records were reviewed including, but not limited to, notes from referring providers, lab work, radiographic tests, and other diagnostic tests. Pertinent results were summarized above.     History     Past Medical History:   Diagnosis Date     Sleep apnea        Past Surgical History:   Procedure Laterality Date     TONSILLECTOMY ADULT Bilateral 11/13/2019    Procedure: Bilateral Tonsillectomy;  Surgeon: Evita Hampton MD;  Location: [facility] OR     TURBINECTOMY Bilateral 11/13/2019    Procedure: Turbinectomy;  Surgeon: Evita Hampton MD;  Location:  OR     UVULOPALATOPHARYNGOPLASTY N/A 11/13/2019    Procedure: Uvulopalatopharyngoplasty, bilateral tonsilectomy and bilateral inferior turbinoplasty;  Surgeon: Evita Hampton MD;  Location:  OR       Social History     Socioeconomic History     Marital status:      Spouse name: Not on file     Number of children: Not on file     Years of education: Not on file     Highest education level: Not on file   Occupational History     Not on file   Social Needs     Financial resource strain: Not on file     Food insecurity     Worry: Not on file     Inability: Not on file     Transportation needs     Medical: Not on file     Non-medical: Not on file   Tobacco Use     Smoking status: Former Smoker     Packs/day: 0.00     Smokeless tobacco: Never Used     Tobacco comment: quit 5 years ago   Substance and Sexual Activity     Alcohol use: Never     Frequency: Never     Drug use: Not on file     Sexual activity: Not on file   Lifestyle     Physical activity      Days per week: Not on file     Minutes per session: Not on file     Stress: Not on file   Relationships     Social connections     Talks on phone: Not on file     Gets together: Not on file     Attends Nondenominational service: Not on file     Active member of club or organization: Not on file     Attends meetings of clubs or organizations: Not on file     Relationship status: Not on file     Intimate partner violence     Fear of current or ex partner: Not on file     Emotionally abused: Not on file     Physically abused: Not on file     Forced sexual activity: Not on file   Other Topics Concern     Not on file   Social History Narrative     Not on file       No family history on file.    Medications and Allergies:     Outpatient Encounter Medications as of 4/29/2021   Medication Sig Dispense Refill     albuterol (PROVENTIL HFA) 108 (90 Base) MCG/ACT inhaler Inhale 2 puffs into the lungs       ALPRAZolam (XANAX PO) Take 1 mg by mouth as needed for anxiety        DULoxetine (CYMBALTA) 60 MG capsule Take 120 mg by mouth       estradiol (ESTRACE) 0.1 MG/GM vaginal cream Place 1 g vaginally       fluticasone (FLONASE) 50 MCG/ACT nasal spray Spray 2 sprays into both nostrils 2 times daily 11.1 mL 3     fluticasone (FLONASE) 50 MCG/ACT nasal spray Spray 1 spray in nostril       meloxicam (MOBIC) 7.5 MG tablet Take 7.5 mg by mouth daily       omeprazole (PRILOSEC) 40 MG DR capsule Take 1 capsule (40 mg) by mouth 2 times daily 180 capsule 3     pravastatin (PRAVACHOL) 40 MG tablet   1     traMADol (ULTRAM) 50 MG tablet Take 50 mg by mouth       TRAZODONE HCL PO Take 100 mg by mouth At Bedtime        triamcinolone (KENALOG) 0.1 % external cream        FLUoxetine HCl (PROZAC PO) Take 20 mg by mouth daily        lamoTRIgine (LAMICTAL) 25 MG chewable tablet Take 2 tablets (50 mg) by mouth daily for 14 days 28 tablet 0     meloxicam (MOBIC) 15 MG tablet Take 1 tablet (15 mg) by mouth daily (Patient not taking: Reported on 12/31/2020)  "10 tablet 0     predniSONE (DELTASONE) 20 MG tablet Take 1 tablet (20 mg) by mouth daily Start on the third day after surgery (Patient not taking: Reported on 12/31/2020) 5 tablet 0     No facility-administered encounter medications on file as of 4/29/2021.         Allergies   Allergen Reactions     Diflucan [Fluconazole]      Mouth sores     Sulfa Drugs Nausea and Vomiting        Review of systems:  A full 10 point review of systems was obtained and was negative except for the pertinent positives and negatives stated within the HPI.    Objective Findings:   Physical Exam:    Constitutional: Ht 1.676 m (5' 6\")   Wt 86.2 kg (190 lb)   BMI 30.67 kg/m    General: Alert, cooperative, no distress, well-appearing  Head: Atraumatic, normocephalic, no obvious abnormalities   Eyes: EOMI, Sclera anicteric, no obvious conjunctival hemorrhage   Nose: Nares normal, no obvious malformation, no obvious rhinorrhea   Respiratory: normal appearing respirations  Musculoskeletal: Range of motion intact, no obvious strength deficit  Skin: No jaundice, no obvious rash  Neurologic: AAOx3, no obvious neurologic abnormality  Psychiatric: Normal Affect, appropriate mood  Extremities: No obvious edema, no obvious malformation     Labs, Radiology, Pathology     No results found for: WBC, HGB, PLT, CHOL, TRIG, HDL, ALT, AST, NA, CREATININE, BUN, CO2, TSH, INR, GLUF     Liver Function Studies - No results for input(s): PROTTOTAL, ALBUMIN, BILITOTAL, ALKPHOS, AST, ALT, BILIDIRECT in the last 85136 hours.       Patient Active Problem List    Diagnosis Date Noted     LPRD (laryngopharyngeal reflux disease) 04/29/2021     Priority: Medium     Globus sensation 04/29/2021     Priority: Medium     Heartburn 04/29/2021     Priority: Medium     Obstructive sleep apnea 11/15/2019     Priority: Medium     NEERU (obstructive sleep apnea) 11/13/2019     Priority: Medium     Major depressive disorder, recurrent episode, severe (H) 08/08/2014     Priority: " Medium     updating diagnosis code for icd10 cutover        Assessment and Plan   Assessment:    Tessie Mcdaniels is a 55 year old female with PMH of IBS, diverticulitis, anxiety, depression, and obstructive sleep apnea s/p uvulopalatopharyngoplasty and turbinate surgery who is presenting as a new patient in consultation at the request of Dr. Salazar with a chief complaint of globus sensation.    1. Globus sensation  This may be related to the phlegm production which she experiences every morning. It is not associated with symptoms of dysphagia and it is not affected by meals. It is possible that these symptoms are related to underlying reflux disease, which bears further investigation. A therapeutic trial of a higher PPI dose for two months while monitoring symptoms will help to establish whether acid reflux is part of the etiology. After this trial, an EGD should be performed due to new probable reflux-related symptoms in a 55-year-old. If the PPI trial does not help with the symptoms, a BRAVO should be placed at the same time for OFF-therapy monitoring. Beyond acid or non-acid reflux, the differential includes increased sensitivity following her uvulopalatopharyngoplasty surgery. Any structural causes of the sensation are largely ruled out by the workup by ENT including the esophagram. Finally, this sensation could be due to increased awareness related to anxiety with panic attacks, although this does not explain the phlegm production in the mornings.      1. Globus sensation    2. LPRD (laryngopharyngeal reflux disease)    3. Heartburn    4. NEERU (obstructive sleep apnea)       No orders of the defined types were placed in this encounter.    Plan:  1. Please increase your omeprazole to 40mg twice daily 30-60 minutes before breakfast and dinner.   2. Monitor for improvement in your symptoms  3. Arrange 2 month follow up  4. Endoscopy with or without bravo (wireless acid reflux monitoring test) will be decided at that  time based on response to the omeprazole.        Follow up plan:   Return to clinic 2 months and as needed.    The risks and benefits of my recommendations, as well as other treatment options were discussed with the patient and any available family today. All questions were answered.     o Follow up: As planned above. Today, I personally spent 76 minutes in direct face to face time with the patient, of which greater than 50% of the time was spent in patient education and counseling as described above. Approximately 15 minutes were spent on indirect care associated with the patient's consultation including but not limited to review of: patient medical records to date, clinic visits, hospital records, lab results, imaging studies, procedural documentation, and coordinating care with other providers. The findings from this review are summarized in the above note.    The patient verbalized understanding of the plan and was appreciative for the time spent and information provided during the office visit.     Attending Attestation:    I was present with the medical student who participated in the service and in the documentation of the note. I  have verified the history and personally performed the physical exam and medical decision making. I agree with the  assessment and plan of care as documented in the note. I saw and evaluated the patient and agree with the findings and plan of care as documented in the note.    In summary, the patient is an 55 year old female with a history of IBS, diverticulitis, anxiety, depression, and obstructive sleep apnea s/p uvulopalatopharyngoplasty and turbinate surgery who is presenting as a new patient in consultation at the request of Dr. Salazar with a chief complaint of globus sensation.    The patient was seen in video telemedicine consultation regarding her symptoms of globus. She is currently on omeprazole 20mg daily and is uncertain if this is helping with her globus. It does appear  to be helping with reflux symptoms which she gets following a large meal and lying down shortly thereafter. IT is possible that her phlegm and throat clearing is related to reflux and we discussed increasing her omeprazole to 40mg BID. If she has a therapeutic response we will lower to the lowest effective dose. She does warrant an endoscopy to assess her esophagus as well however that will be decided based on response to PPI. The rationale for waiting to complete a PPI trial is that if she does not have an adequate response we will discuss performing a Bravo OFF therapy at the time of endoscopy to assess for acid reflux disease. She does NOT have a nickel allergy. Another possible explanation is that she has developed a hypersensitivity/hypervigilance syndrome following surgery. Neuromodulation adjusting may be beneficial following aggressive treatment for presumed reflux.     Overall time spent discussing, thinking, reviewing the chart including available imaging and labs, and evaluating the patient was 76 minutes.    Patient was seen April 29, 2021    Jhon Chavez DO   of Medicine  Division of Gastroenterology, Hepatology, and Nutrition  [facility]    Author:   Jhon Chavez DO   of Medicine  Division of Gastroenterology, Hepatology, and Nutrition  [facility]     Documentation assisted by voice recognition and documentation system.      Tessie is a 55 year old who is being evaluated via a billable video visit.      Please send link invite to:  [email or web address]  How would you like to obtain your AVS? MyChart  If the video visit is dropped, the invitation should be resent by: Text to cell phone: [phone]  Will anyone else be joining your video visit? No    Video-Visit Details    Type of service:  Video Visit    Video Start Time: 230    Video End Time:346    Originating Location (pt. Location): Home    Distant Location (provider location):   [facility] [place]     Platform used for Video Visit: Tish

## 2021-04-30 NOTE — PATIENT INSTRUCTIONS
It was a pleasure taking care of you today.  I've included a brief summary of our discussion and care plan from today's visit below.  Please review this information with your primary care provider.  _______________________________________________________________________    My recommendations are summarized as follows:    1. Please increase your omeprazole to 40mg twice daily 30-60 minutes before breakfast and dinner.   2. Monitor for improvement in your symptoms  3. Arrange 2 month follow up  4. Endoscopy with or without bravo (wireless acid reflux monitoring test) will be decided at that time based on response to the omeprazole.    To schedule endoscopic procedures you may call: [phone]  To schedule radiology tests you may call: [phone]  To schedule an ENT appointment you may call: [phone]    Please call our nurse Lorna with any questions or concerns- [phone].  --    Return to GI Clinic in 2 months to review your progress.    _______________________________________________________________________    Who do I call with any questions after my visit?  Please be in touch if there are any further questions that arise following today's visit.  There are multiple ways to contact your gastroenterology care team.        During business hours, you may reach a Gastroenterology nurse at [phone] and choose option 3.         To schedule or reschedule an appointment, please call [phone].       You can always send a secure message through [employer].  [employer] messages are answered by your nurse or doctor typically within 24 hours.  Please allow extra time on weekends and holidays.        For urgent/emergent questions after business hours, you may reach the on-call GI Fellow by contacting the [facility] at [phone].     How will I get the results of any tests ordered?    You will receive all of your results.  If you have signed [facility] for [facility], any tests ordered at your visit will be  available to you after your physician reviews them.  Typically this takes 1-2 weeks.  If there are urgent results that require a change in your care plan, your physician or nurse will call you to discuss the next steps.      What is [employer]?  [employer] is a secure way for you to access all of your healthcare records from the [facility].  It is a web based computer program, so you can sign on to it from any location.  It also allows you to send secure messages to your care team.  I recommend signing up for [employer] access if you have not already done so and are comfortable with using a computer.      How to I schedule a follow-up visit?  If you did not schedule a follow-up visit today, please call [phone] to schedule a follow-up office visit.        Sincerely,    Jhon Chavez DO   of Medicine  Division of Gastroenterology, Hepatology, and Nutrition  [facility]

## 2021-07-30 ENCOUNTER — TELEPHONE (OUTPATIENT)
Dept: GASTROENTEROLOGY | Facility: CLINIC | Age: 56
End: 2021-07-30

## 2021-07-30 DIAGNOSIS — R12 HEARTBURN: ICD-10-CM

## 2021-07-30 DIAGNOSIS — R09.A2 GLOBUS SENSATION: Primary | ICD-10-CM

## 2021-07-30 NOTE — TELEPHONE ENCOUNTER
[facility] Call Center    Phone Message    May a detailed message be left on voicemail: yes     Reason for Call: Other: Per pt would like  know the medication omeprazole (PRILOSEC) 40 MG DR capsule did not help her lump. Per pt was told that if the medication doesnt help  will schedule for a procedure. Please call pt back to discuss. Thank you.      Action Taken: Message routed to:  [facility] ([facility] Gastro    Travel Screening: Not Applicable

## 2021-08-07 DIAGNOSIS — Z11.59 ENCOUNTER FOR SCREENING FOR OTHER VIRAL DISEASES: ICD-10-CM

## 2021-08-09 NOTE — TELEPHONE ENCOUNTER
Reached out to pt to answer any questions about POC. Pt is currently scheduled for the EGD with Dr Chavez on 9/14

## 2021-08-12 ENCOUNTER — TELEPHONE (OUTPATIENT)
Dept: GASTROENTEROLOGY | Facility: CLINIC | Age: 56
End: 2021-08-12

## 2021-09-02 ENCOUNTER — TELEPHONE (OUTPATIENT)
Dept: GASTROENTEROLOGY | Facility: CLINIC | Age: 56
End: 2021-09-02

## 2021-09-02 NOTE — TELEPHONE ENCOUNTER
Attempted to contact patient regarding upcoming EGD w/BRAVO and endoflip procedure on 9/14/21 for pre assessment questions. No answer.     Left message to return call to [phone] #2    Covid test scheduled?    Pre op exam?     Arrival time: 1300    Facility location: [facility]    Sedation type: MAC    Stop PPIs 7 days prior to procedure.     Consulting with endoscopy lead regarding endoflip instructions. Can still complete partial pre assessment.     Evita Ocasio RN

## 2021-09-07 NOTE — TELEPHONE ENCOUNTER
Patient returning call.     Pre assessment questions completed for upcoming EGD w/Olivares procedure scheduled on 9/14/21    COVID test scheduled 9/10/21 per patient at [facility]. Gave fax number to send results [phone]    Pre-op scheduled 9/8/21 per patient at [facility] with Dr. Guy. Patient has fax number to send report.    Procedural arrival time and facility location reviewed.    Designated  policy reviewed. Instructed to have someone stay 24 hours post procedure.     Reviewed bravo prep instructions with patient. No PPIs 7 days prior to procedure. NPO 6 hours prior to arrival time.    Anticoagulation/blood thinners? no    Electronic implanted devices? no    Patient verbalized understanding and had no questions or concerns at this time.    Endoflip instructions not discussed.     Evita Ocasio RN

## 2021-09-08 ENCOUNTER — TRANSFERRED RECORDS (OUTPATIENT)
Dept: HEALTH INFORMATION MANAGEMENT | Facility: CLINIC | Age: 56
End: 2021-09-08

## 2021-09-08 LAB
CREATININE (EXTERNAL): 0.94 MG/DL (ref 0.57–1.11)
GFR ESTIMATED (EXTERNAL): >60 ML/MIN/1.73M2
GFR ESTIMATED (IF AFRICAN AMERICAN) (EXTERNAL): >60 ML/MIN/1.73M2
GLUCOSE (EXTERNAL): 104 MG/DL (ref 70–100)
POTASSIUM (EXTERNAL): 4.4 MMOL/L (ref 3.5–5.1)

## 2021-09-09 NOTE — TELEPHONE ENCOUNTER
"Message from scheduling team stating \"Patient was wondering if she can take albuterol still?\"    RN attempted to contact pt.  No answer.   Left message to return call [phone] #2    Pat Moreno RN          "

## 2021-09-14 ENCOUNTER — ANESTHESIA (OUTPATIENT)
Dept: GASTROENTEROLOGY | Facility: CLINIC | Age: 56
End: 2021-09-14
Payer: MEDICARE

## 2021-09-14 ENCOUNTER — HOSPITAL ENCOUNTER (OUTPATIENT)
Facility: CLINIC | Age: 56
Discharge: HOME | End: 2021-09-14
Attending: INTERNAL MEDICINE | Admitting: INTERNAL MEDICINE
Payer: MEDICARE

## 2021-09-14 ENCOUNTER — ANESTHESIA EVENT (OUTPATIENT)
Dept: GASTROENTEROLOGY | Facility: CLINIC | Age: 56
End: 2021-09-14
Payer: MEDICARE

## 2021-09-14 VITALS
BODY MASS INDEX: 34.47 KG/M2 | TEMPERATURE: 97.8 F | HEART RATE: 99 BPM | SYSTOLIC BLOOD PRESSURE: 121 MMHG | HEIGHT: 66 IN | DIASTOLIC BLOOD PRESSURE: 81 MMHG | WEIGHT: 214.51 LBS | RESPIRATION RATE: 18 BRPM | OXYGEN SATURATION: 98 %

## 2021-09-14 LAB — UPPER GI ENDOSCOPY: NORMAL

## 2021-09-14 PROCEDURE — 370N000017 HC ANESTHESIA TECHNICAL FEE, PER MIN: Performed by: INTERNAL MEDICINE

## 2021-09-14 PROCEDURE — 250N000009 HC RX 250: Performed by: DENTIST

## 2021-09-14 PROCEDURE — 258N000003 HC RX IP 258 OP 636: Performed by: DENTIST

## 2021-09-14 PROCEDURE — 250N000011 HC RX IP 250 OP 636: Performed by: DENTIST

## 2021-09-14 PROCEDURE — 91035 G-ESOPH REFLX TST W/ELECTROD: CPT | Performed by: INTERNAL MEDICINE

## 2021-09-14 PROCEDURE — 43235 EGD DIAGNOSTIC BRUSH WASH: CPT | Mod: XS

## 2021-09-14 PROCEDURE — 91040 ESOPH BALLOON DISTENSION TST: CPT | Performed by: INTERNAL MEDICINE

## 2021-09-14 RX ORDER — LOSARTAN POTASSIUM 100 MG/1
100 TABLET ORAL DAILY
COMMUNITY
Start: 2021-08-25

## 2021-09-14 RX ORDER — ACETAMINOPHEN 325 MG/1
975 TABLET ORAL EVERY 6 HOURS PRN
Status: DISCONTINUED | OUTPATIENT
Start: 2021-09-14 | End: 2021-09-14 | Stop reason: HOSPADM

## 2021-09-14 RX ORDER — PROPOFOL 10 MG/ML
INJECTION, EMULSION INTRAVENOUS PRN
Status: DISCONTINUED | OUTPATIENT
Start: 2021-09-14 | End: 2021-09-14

## 2021-09-14 RX ORDER — LIDOCAINE HYDROCHLORIDE 20 MG/ML
INJECTION, SOLUTION INFILTRATION; PERINEURAL PRN
Status: DISCONTINUED | OUTPATIENT
Start: 2021-09-14 | End: 2021-09-14

## 2021-09-14 RX ORDER — ONDANSETRON 2 MG/ML
INJECTION INTRAMUSCULAR; INTRAVENOUS PRN
Status: DISCONTINUED | OUTPATIENT
Start: 2021-09-14 | End: 2021-09-14

## 2021-09-14 RX ORDER — ALBUTEROL SULFATE 0.83 MG/ML
2.5 SOLUTION RESPIRATORY (INHALATION) EVERY 4 HOURS PRN
Status: DISCONTINUED | OUTPATIENT
Start: 2021-09-14 | End: 2021-09-14 | Stop reason: HOSPADM

## 2021-09-14 RX ORDER — SODIUM CHLORIDE, SODIUM LACTATE, POTASSIUM CHLORIDE, CALCIUM CHLORIDE 600; 310; 30; 20 MG/100ML; MG/100ML; MG/100ML; MG/100ML
INJECTION, SOLUTION INTRAVENOUS CONTINUOUS
Status: DISCONTINUED | OUTPATIENT
Start: 2021-09-14 | End: 2021-09-14 | Stop reason: HOSPADM

## 2021-09-14 RX ORDER — ONDANSETRON 2 MG/ML
4 INJECTION INTRAMUSCULAR; INTRAVENOUS EVERY 6 HOURS PRN
Status: DISCONTINUED | OUTPATIENT
Start: 2021-09-14 | End: 2021-09-14 | Stop reason: HOSPADM

## 2021-09-14 RX ORDER — PROPOFOL 10 MG/ML
INJECTION, EMULSION INTRAVENOUS CONTINUOUS PRN
Status: DISCONTINUED | OUTPATIENT
Start: 2021-09-14 | End: 2021-09-14

## 2021-09-14 RX ORDER — FENTANYL CITRATE 50 UG/ML
25 INJECTION, SOLUTION INTRAMUSCULAR; INTRAVENOUS EVERY 5 MIN PRN
Status: DISCONTINUED | OUTPATIENT
Start: 2021-09-14 | End: 2021-09-14 | Stop reason: HOSPADM

## 2021-09-14 RX ORDER — NALOXONE HYDROCHLORIDE 0.4 MG/ML
0.2 INJECTION, SOLUTION INTRAMUSCULAR; INTRAVENOUS; SUBCUTANEOUS
Status: DISCONTINUED | OUTPATIENT
Start: 2021-09-14 | End: 2021-09-14 | Stop reason: HOSPADM

## 2021-09-14 RX ORDER — NALOXONE HYDROCHLORIDE 0.4 MG/ML
0.4 INJECTION, SOLUTION INTRAMUSCULAR; INTRAVENOUS; SUBCUTANEOUS
Status: DISCONTINUED | OUTPATIENT
Start: 2021-09-14 | End: 2021-09-14 | Stop reason: HOSPADM

## 2021-09-14 RX ORDER — ONDANSETRON 2 MG/ML
4 INJECTION INTRAMUSCULAR; INTRAVENOUS EVERY 30 MIN PRN
Status: DISCONTINUED | OUTPATIENT
Start: 2021-09-14 | End: 2021-09-14 | Stop reason: HOSPADM

## 2021-09-14 RX ORDER — AMLODIPINE BESYLATE 5 MG/1
5 TABLET ORAL DAILY
COMMUNITY
Start: 2021-08-25

## 2021-09-14 RX ORDER — PROCHLORPERAZINE MALEATE 10 MG
10 TABLET ORAL EVERY 6 HOURS PRN
Status: DISCONTINUED | OUTPATIENT
Start: 2021-09-14 | End: 2021-09-14 | Stop reason: HOSPADM

## 2021-09-14 RX ORDER — HALOPERIDOL 5 MG/ML
1 INJECTION INTRAMUSCULAR
Status: DISCONTINUED | OUTPATIENT
Start: 2021-09-14 | End: 2021-09-14 | Stop reason: HOSPADM

## 2021-09-14 RX ORDER — HYDROMORPHONE HYDROCHLORIDE 1 MG/ML
0.2 INJECTION, SOLUTION INTRAMUSCULAR; INTRAVENOUS; SUBCUTANEOUS EVERY 5 MIN PRN
Status: DISCONTINUED | OUTPATIENT
Start: 2021-09-14 | End: 2021-09-14 | Stop reason: HOSPADM

## 2021-09-14 RX ORDER — SODIUM CHLORIDE, SODIUM LACTATE, POTASSIUM CHLORIDE, CALCIUM CHLORIDE 600; 310; 30; 20 MG/100ML; MG/100ML; MG/100ML; MG/100ML
INJECTION, SOLUTION INTRAVENOUS CONTINUOUS PRN
Status: DISCONTINUED | OUTPATIENT
Start: 2021-09-14 | End: 2021-09-14

## 2021-09-14 RX ORDER — OXYCODONE HYDROCHLORIDE 5 MG/1
5 TABLET ORAL EVERY 4 HOURS PRN
Status: DISCONTINUED | OUTPATIENT
Start: 2021-09-14 | End: 2021-09-14 | Stop reason: HOSPADM

## 2021-09-14 RX ORDER — ATORVASTATIN CALCIUM 40 MG/1
40 TABLET, FILM COATED ORAL EVERY 24 HOURS
COMMUNITY
End: 2024-07-11

## 2021-09-14 RX ORDER — ONDANSETRON 4 MG/1
4 TABLET, ORALLY DISINTEGRATING ORAL EVERY 6 HOURS PRN
Status: DISCONTINUED | OUTPATIENT
Start: 2021-09-14 | End: 2021-09-14 | Stop reason: HOSPADM

## 2021-09-14 RX ORDER — DIMENHYDRINATE 50 MG/ML
25 INJECTION, SOLUTION INTRAMUSCULAR; INTRAVENOUS
Status: DISCONTINUED | OUTPATIENT
Start: 2021-09-14 | End: 2021-09-14 | Stop reason: HOSPADM

## 2021-09-14 RX ORDER — FLUMAZENIL 0.1 MG/ML
0.2 INJECTION, SOLUTION INTRAVENOUS
Status: DISCONTINUED | OUTPATIENT
Start: 2021-09-14 | End: 2021-09-14 | Stop reason: HOSPADM

## 2021-09-14 RX ORDER — ONDANSETRON 4 MG/1
4 TABLET, ORALLY DISINTEGRATING ORAL EVERY 30 MIN PRN
Status: DISCONTINUED | OUTPATIENT
Start: 2021-09-14 | End: 2021-09-14 | Stop reason: HOSPADM

## 2021-09-14 RX ORDER — FENTANYL CITRATE 50 UG/ML
INJECTION, SOLUTION INTRAMUSCULAR; INTRAVENOUS PRN
Status: DISCONTINUED | OUTPATIENT
Start: 2021-09-14 | End: 2021-09-14

## 2021-09-14 RX ADMIN — FENTANYL CITRATE 50 MCG: 50 INJECTION, SOLUTION INTRAMUSCULAR; INTRAVENOUS at 14:29

## 2021-09-14 RX ADMIN — PROPOFOL 30 MG: 10 INJECTION, EMULSION INTRAVENOUS at 14:42

## 2021-09-14 RX ADMIN — MIDAZOLAM 2 MG: 1 INJECTION INTRAMUSCULAR; INTRAVENOUS at 14:32

## 2021-09-14 RX ADMIN — PROPOFOL 150 MCG/KG/MIN: 10 INJECTION, EMULSION INTRAVENOUS at 14:21

## 2021-09-14 RX ADMIN — PROPOFOL 40 MG: 10 INJECTION, EMULSION INTRAVENOUS at 14:46

## 2021-09-14 RX ADMIN — PROPOFOL 50 MG: 10 INJECTION, EMULSION INTRAVENOUS at 14:27

## 2021-09-14 RX ADMIN — SODIUM CHLORIDE, POTASSIUM CHLORIDE, SODIUM LACTATE AND CALCIUM CHLORIDE: 600; 310; 30; 20 INJECTION, SOLUTION INTRAVENOUS at 14:17

## 2021-09-14 RX ADMIN — PROPOFOL 20 MG: 10 INJECTION, EMULSION INTRAVENOUS at 14:52

## 2021-09-14 RX ADMIN — ONDANSETRON 4 MG: 2 INJECTION INTRAMUSCULAR; INTRAVENOUS at 15:00

## 2021-09-14 RX ADMIN — FENTANYL CITRATE 50 MCG: 50 INJECTION, SOLUTION INTRAMUSCULAR; INTRAVENOUS at 14:57

## 2021-09-14 RX ADMIN — PROPOFOL 30 MG: 10 INJECTION, EMULSION INTRAVENOUS at 14:50

## 2021-09-14 RX ADMIN — TOPICAL ANESTHETIC 1 EACH: 200 SPRAY DENTAL; PERIODONTAL at 14:20

## 2021-09-14 RX ADMIN — PROPOFOL 50 MG: 10 INJECTION, EMULSION INTRAVENOUS at 14:24

## 2021-09-14 RX ADMIN — LIDOCAINE HYDROCHLORIDE 100 MG: 20 INJECTION, SOLUTION INFILTRATION; PERINEURAL at 14:24

## 2021-09-14 ASSESSMENT — MIFFLIN-ST. JEOR: SCORE: 1569.75

## 2021-09-14 NOTE — OR NURSING
Procedure: Upper Endoscopy with Bravo clip placement and Endo Flip  Sedation: Monitored Anesthesia Care    Tolerated: VS stable during and post procedure. Not c/o abdominal or chest pain.  Report: Given to Inga MOHR RN  Pt to recovery area in stable condition, accompanied by GENNARO Lea RN

## 2021-09-14 NOTE — ANESTHESIA CARE TRANSFER NOTE
Patient: Tessie Mcdaniels    Procedure(s):  ESOPHAGOGASTRODUODENOSCOPY, WITH BRAVO PH MONITORING DEVICE INSERTION  Esophageal Balloon Provocation Study    Diagnosis: Globus sensation [R09.89]  Diagnosis Additional Information: No value filed.    Anesthesia Type:   MAC     Note:      Level of Consciousness: awake  Oxygen Supplementation: nasal cannula    Independent Airway: airway patency satisfactory and stable  Dentition: dentition unchanged  Vital Signs Stable: post-procedure vital signs reviewed and stable    Patient transferred to: Phase II      post-procedure handoff checklist not completed for medical reasons    Vitals:  Vitals Value Taken Time   BP     Temp     Pulse     Resp     SpO2         Electronically Signed By: Surya Romero MD  September 14, 2021  3:02 PM

## 2021-09-14 NOTE — H&P
Tessie Mcdaniels  [id number]  female  56 year old      Reason for procedure/surgery: egd, endoflip, bravo for globus    Patient Active Problem List   Diagnosis     Major depressive disorder, recurrent episode, severe (H)     NEERU (obstructive sleep apnea)     Obstructive sleep apnea     LPRD (laryngopharyngeal reflux disease)     Globus sensation     Heartburn       Past Surgical History:    Past Surgical History:   Procedure Laterality Date     TONSILLECTOMY ADULT Bilateral 11/13/2019    Procedure: Bilateral Tonsillectomy;  Surgeon: Evita Hampton MD;  Location: [facility] OR     TURBINECTOMY Bilateral 11/13/2019    Procedure: Turbinectomy;  Surgeon: Evita Hampton MD;  Location: [facility]     UVULOPALATOPHARYNGOPLASTY N/A 11/13/2019    Procedure: Uvulopalatopharyngoplasty, bilateral tonsilectomy and bilateral inferior turbinoplasty;  Surgeon: Evita Hampton MD;  Location: [facility] OR       Past Medical History:   Past Medical History:   Diagnosis Date     PONV (postoperative nausea and vomiting)      Sleep apnea        Social History:   Social History     Tobacco Use     Smoking status: Former Smoker     Packs/day: 0.00     Smokeless tobacco: Never Used     Tobacco comment: quit 5 years ago   Substance Use Topics     Alcohol use: Not Currently     Comment: occationally       Family History: History reviewed. No pertinent family history.    Allergies:   Allergies   Allergen Reactions     Diflucan [Fluconazole]      Mouth sores     Sulfa Drugs Nausea and Vomiting       Active Medications:   No current outpatient medications on file.       Systemic Review:   CONSTITUTIONAL: NEGATIVE for fever, chills, change in weight  ENT/MOUTH: NEGATIVE for ear, mouth and throat problems  RESP: NEGATIVE for significant cough or SOB  CV: NEGATIVE for chest pain, palpitations or peripheral edema    Physical Examination:   Vital Signs: BP (!) 135/108 (BP Location: Left arm)   Pulse 74   Temp 97.8  F (36.6  C) (Oral)    "Resp 17   Ht 1.676 m (5' 6\")   Wt 97.3 kg (214 lb 8.1 oz)   SpO2 97%   BMI 34.62 kg/m    GENERAL: healthy, alert and no distress  NECK: no adenopathy, no asymmetry, masses, or scars  RESP: lungs clear to auscultation - no rales, rhonchi or wheezes  CV: regular rate and rhythm, normal S1 S2, no S3 or S4, no murmur, click or rub, no peripheral edema and peripheral pulses strong  ABDOMEN: soft, nontender, no hepatosplenomegaly, no masses and bowel sounds normal  MS: no gross musculoskeletal defects noted, no edema    Plan: Appropriate to proceed as scheduled.      Jhon Chavez DO  9/14/2021    PCP:  Jammie Ramos    "

## 2021-09-14 NOTE — OP NOTE
Please see endoscopy report for full description of the procedure.     Jhon Chavez DO   of Medicine  Division of Gastroenterology, Hepatology, and Nutrition  [facility]      EndoFLIP directed at the LES  16cm (EF-322) balloon length):   Date: 9/14/21    16cm balloon  Balloon inflation Balloon pressure CSA (mm^2) DI (mm^2/mmHg) Dmin (mm) Compliance   30 (ladmark ID) 37.3  10.95 22    40 50  10.03 25.3    50 25.3  6.87 19.9    60 62.1  6.01 21.8    70 75  5.37 22.7

## 2021-09-14 NOTE — OR NURSING
Procedure: Upper endoscopy with Bravo clip placement and Endo flip recording  Sedation:Monitored Anesthesia Care  Tolerated: VS stable during and post procedure. Not c/o abdominal or chest pain.  Report: Given to Inga MOHR RN  Pt to recovery area in stable condition, accompanied by GENNARO Lea RN

## 2021-09-14 NOTE — ANESTHESIA POSTPROCEDURE EVALUATION
Patient: Tessie Mcdaniels    Procedure(s):  ESOPHAGOGASTRODUODENOSCOPY, WITH BRAVO PH MONITORING DEVICE INSERTION  Esophageal Balloon Provocation Study    Diagnosis:Globus sensation [R09.89]  Diagnosis Additional Information: No value filed.    Anesthesia Type:  MAC    Note:  Disposition: Outpatient   Postop Pain Control: Uneventful            Sign Out: Well controlled pain   PONV: No   Neuro/Psych: Uneventful            Sign Out: Acceptable/Baseline neuro status   Airway/Respiratory: Uneventful            Sign Out: Acceptable/Baseline resp. status   CV/Hemodynamics: Uneventful            Sign Out: Acceptable CV status; No obvious hypovolemia; No obvious fluid overload   Other NRE:    DID A NON-ROUTINE EVENT OCCUR?            Last vitals:  Vitals Value Taken Time   BP     Temp     Pulse     Resp     SpO2         Electronically Signed By: Tacos Andrew MD  September 14, 2021  4:38 PM

## 2021-09-14 NOTE — ANESTHESIA PREPROCEDURE EVALUATION
Anesthesia Pre-Procedure Evaluation    Patient: Tessie Mcdaniels   MRN: [id number] : 1965        Preoperative Diagnosis: Globus sensation [R09.89]   Procedure : Procedure(s):  ESOPHAGOGASTRODUODENOSCOPY, WITH BRAVO PH MONITORING DEVICE INSERTION  Esophageal Balloon Provocation Study     Past Medical History:   Diagnosis Date     PONV (postoperative nausea and vomiting)      Sleep apnea       Past Surgical History:   Procedure Laterality Date     TONSILLECTOMY ADULT Bilateral 2019    Procedure: Bilateral Tonsillectomy;  Surgeon: Evita Hampton MD;  Location: [facility]     TURBINECTOMY Bilateral 2019    Procedure: Turbinectomy;  Surgeon: Evita Hampton MD;  Location: [facility]     UVULOPALATOPHARYNGOPLASTY N/A 2019    Procedure: Uvulopalatopharyngoplasty, bilateral tonsilectomy and bilateral inferior turbinoplasty;  Surgeon: Evita Hampton MD;  Location: [facility] OR      Allergies   Allergen Reactions     Diflucan [Fluconazole]      Mouth sores     Sulfa Drugs Nausea and Vomiting      Social History     Tobacco Use     Smoking status: Former Smoker     Packs/day: 0.00     Smokeless tobacco: Never Used     Tobacco comment: quit 5 years ago   Substance Use Topics     Alcohol use: Not Currently     Comment: occationally      Wt Readings from Last 1 Encounters:   21 97.3 kg (214 lb 8.1 oz)        Anesthesia Evaluation   Pt has had prior anesthetic. Type: General.    History of anesthetic complications  - PONV.  Had PONV once with uvuloplasty, did fine with other anesthetics.    ROS/MED HX  ENT/Pulmonary:     (+) sleep apnea, uses CPAP,     Neurologic:       Cardiovascular:    (-) murmur and wheezes   METS/Exercise Tolerance:     Hematologic:       Musculoskeletal:       GI/Hepatic:     (+) GERD,     Renal/Genitourinary:       Endo:       Psychiatric/Substance Use:       Infectious Disease:       Malignancy:       Other:            Physical Exam    Airway        Mallampati:  II   TM distance: > 3 FB   Neck ROM: full   Mouth opening: > 3 cm    Respiratory Devices and Support         Dental  no notable dental history         Cardiovascular          Rhythm and rate: regular and normal (-) no systolic click and no murmur    Pulmonary   pulmonary exam normal        breath sounds clear to auscultation   (-) no wheezes        OUTSIDE LABS:  CBC: No results found for: WBC, HGB, HCT, PLT  BMP: No results found for: NA, POTASSIUM, CHLORIDE, CO2, BUN, CR, GLC  COAGS: No results found for: PTT, INR, FIBR  POC:   Lab Results   Component Value Date     (H) 11/15/2019     HEPATIC: No results found for: ALBUMIN, PROTTOTAL, ALT, AST, GGT, ALKPHOS, BILITOTAL, BILIDIRECT, LAUREN  OTHER: No results found for: PH, LACT, A1C, SAIDA, PHOS, MAG, LIPASE, AMYLASE, TSH, T4, T3, CRP, SED    Anesthesia Plan    ASA Status:  2      Anesthesia Type: MAC.   Induction: Intravenous.   Maintenance: Balanced.        Consents    Anesthesia Plan(s) and associated risks, benefits, and realistic alternatives discussed. Questions answered and patient/representative(s) expressed understanding.     - Discussed with:  Patient      - Extended Intubation/Ventilatory Support Discussed: No.      - Patient is DNR/DNI Status: No    Use of blood products discussed: Yes.     - Discussed with: Patient.     - Consented: consented to blood products            Reason for refusal: other.     Postoperative Care    Pain management: IV analgesics.   PONV prophylaxis: Ondansetron (or other 5HT-3)     Comments:    Pt reports strong gag reflex, good localization             Christopher J. Behrens, MD

## 2021-09-25 ENCOUNTER — HEALTH MAINTENANCE LETTER (OUTPATIENT)
Age: 56
End: 2021-09-25

## 2021-09-29 ENCOUNTER — TELEPHONE (OUTPATIENT)
Dept: GASTROENTEROLOGY | Facility: CLINIC | Age: 56
End: 2021-09-29

## 2021-09-29 NOTE — TELEPHONE ENCOUNTER
[facility] Call Center    Phone Message    May a detailed message be left on voicemail: yes     Reason for Call: Other: Pt called in requesting a call back to discuss her results as well as send over images that show the contents of her stomach. Please reach out. Thank you.     Action Taken: Message routed to:  [facility] karla gi    Travel Screening: Not Applicable

## 2021-10-04 ENCOUNTER — MYC MEDICAL ADVICE (OUTPATIENT)
Dept: GASTROENTEROLOGY | Facility: CLINIC | Age: 56
End: 2021-10-04

## 2021-10-18 ENCOUNTER — TELEPHONE (OUTPATIENT)
Dept: GASTROENTEROLOGY | Facility: CLINIC | Age: 56
End: 2021-10-18

## 2021-10-18 NOTE — TELEPHONE ENCOUNTER
[facility] Call [facility]    Phone Message    May a detailed message be left on voicemail: yes     Reason for Call: Other: Please call pt back to schedule a follow up appt. Per pt would like a sooner appt than what writer can schedule for video visit. Per pt would like a video visit with  as he also stated in one of his [employer] messages to pt. Please call pt back to schedule.      Action Taken: Message routed to:  [facility] Gastro    Travel Screening: Not Applicable

## 2021-10-22 NOTE — TELEPHONE ENCOUNTER
Confirmed with provider that this pt can be booked into spot on 11/5 at 9:20 for a follow up. Called and spoke with pt. Scheduled accordingly.

## 2021-10-26 ENCOUNTER — TELEPHONE (OUTPATIENT)
Dept: GASTROENTEROLOGY | Facility: CLINIC | Age: 56
End: 2021-10-26

## 2021-10-26 NOTE — TELEPHONE ENCOUNTER
LVM with pod number, per clinic pt can r/s her 11/5 visit with Dr. Chavez to be on 10/29 at 10:40am (held slot). Visit in [facility] clinic.

## 2021-10-29 ENCOUNTER — VIRTUAL VISIT (OUTPATIENT)
Dept: GASTROENTEROLOGY | Facility: CLINIC | Age: 56
End: 2021-10-29
Payer: COMMERCIAL

## 2021-10-29 DIAGNOSIS — G47.33 OSA (OBSTRUCTIVE SLEEP APNEA): ICD-10-CM

## 2021-10-29 DIAGNOSIS — R12 HEARTBURN: ICD-10-CM

## 2021-10-29 DIAGNOSIS — K21.9 GASTROESOPHAGEAL REFLUX DISEASE WITHOUT ESOPHAGITIS: ICD-10-CM

## 2021-10-29 DIAGNOSIS — G47.33 OBSTRUCTIVE SLEEP APNEA: ICD-10-CM

## 2021-10-29 DIAGNOSIS — F33.2 SEVERE EPISODE OF RECURRENT MAJOR DEPRESSIVE DISORDER, WITHOUT PSYCHOTIC FEATURES (H): ICD-10-CM

## 2021-10-29 DIAGNOSIS — R09.A2 GLOBUS SENSATION: Primary | ICD-10-CM

## 2021-10-29 PROCEDURE — 99215 OFFICE O/P EST HI 40 MIN: CPT | Mod: 95 | Performed by: INTERNAL MEDICINE

## 2021-10-29 RX ORDER — SEMAGLUTIDE 1.34 MG/ML
0.5 INJECTION, SOLUTION SUBCUTANEOUS
COMMUNITY
Start: 2021-08-18 | End: 2024-01-31 | Stop reason: SINTOL

## 2021-10-29 RX ORDER — PREGABALIN 75 MG/1
75 CAPSULE ORAL
COMMUNITY
Start: 2021-09-07 | End: 2024-07-11

## 2021-10-29 NOTE — PROGRESS NOTES
Tessie is a 56 year old who is being evaluated via a billable video visit.      How would you like to obtain your AVS? MyChart  If the video visit is dropped, the invitation should be resent by: Text to cell phone: [phone]  Will anyone else be joining your video visit? No      Video Start Time: 1013  Video-Visit Details    Type of service:  Video Visit    Video End Time:10:43 AM    Originating Location (pt. Location): Home    Distant Location (provider location):  [facility]     Platform used for Video Visit: [facility]     Gastroenterology Visit for: Tessie Mcdaniels 1965   MRN: [id number]     Reason for Visit:  chief complaint    Referred by: Martin  / [facility] / [facility] [id number]  Patient Care Team:  Jammie Ramos DO as PCP - General (Family Practice)  Jhon Chavez DO as Assigned Gastroenterology Provider  Wen Salazar MD as Assigned Surgical Provider    History of Present Illness:   Tessie Mcdaniels is a 55 year old female with PMH of IBS, diverticulitis, anxiety, depression, and obstructive sleep apnea s/p uvulopalatopharyngoplasty and turbinate surgery who is presenting as a follow up patient in consultation at the request of Dr. Salazar with a chief complaint of globus sensation.    10/29/21  Threw up long piece of dark phlegm. Continues to have globus sensation. Restarted omeprazole 40mg BID. No change in symptoms with Omeprazole 40 BID.  Lots of phlegm and slimey feeling in the morning. Able to produce thick mucus int he morning. She states that she uses her CPAP nightly.  Denies typical symptoms of acid reflux. No heartburn. Wonders if her symptoms could be anxiety related.     ---------------------------------------------------------------  4/29/21  Tessie Mcdaniels states that every morning wakes up with lump of phlegm in throat. This started after she had a cold 3-4 weeks after UPPP procedure for her sleep apnea. It has been stable since January of  2020. She coughs up a lump of phlegm in the morning, but feels throughout the day that there is a lump in her throat. There is nothing to cough up. ENT performed laryngoscopy and video barium study, and they told her her throat looked fine but they found reflux on esophagram. The doctor thinks this is possibly the cause of her symptoms, but she is confused because she does have phlegm in the mornings. She reports that she has been instructed to try gargling salt water, but she would rather just cough the phlegm up.     She feels like a lump all day, including between meals. She gestures to anterior neck midline near thyroid to describe the location of the lump sensation. It is not affected by eating, and she has no swallowing difficulties. She thinks she may have some excess saliva production throughout day. No regurgitation at night, no heartburn symptoms except after large meals and immediately lying down. She endorses vomiting/gagging 2-3 times per week during panic attacks. Abdominal cramping 3-4 times a week, which she associates with IBS. Alternating constipation/diarrhea throughout week. Dry air is mildly provocative, and she has had mild symptom improvement with omeprazole 20mg/day.    Ms. Mcdaniels says that this lump in her throat is bothersome and she would like to stop experiencing it, as well as the phlegm production.    She has been on gabapentin previously but did not tolerate it well.   ---------------------------------------------------------------     Tessie Mcdaniels denies dysphagia, odynophagia, heartburn/reflux, nausea, early satiety, abdominal pain, abdominal distension/bloating, hematochezia, or melena. No unintentional weight loss.     Family history of colon cancer: No  Wt Readings from Last 5 Encounters:   09/14/21 97.3 kg (214 lb 8.1 oz)   04/29/21 86.2 kg (190 lb)   03/09/21 86.2 kg (190 lb)   12/31/20 89.8 kg (198 lb)   01/21/20 88 kg (194 lb)        Esophageal Questionnaire(s)    BEDQ  Questionnaire  No flowsheet data found.  No flowsheet data found.    Eckardt Questionnaire  No flowsheet data found.    Promis 10 Questionnaire  No flowsheet data found.        STUDIES & PROCEDURES:    EGD:   Date: 9/14/21  Impression:  The examined esophagus was normal. With the patient in the left lateral        decubitus position, a 16 cm long 3 mm diameter functional lumen imaging        probe (FLIP), with a volume-based barostat bag, was placed at the lower        esophageal sphincter without endoscopic visualization for the globus.        Saline was infused into the bag to a volume of 30 mL. Additional saline        was infused to a volume of 40 mL, Additional saline was infused to a        volume of 50 mL, Additional saline was infused to a volume of 60 mL and        Additional saline was infused to a volume of 70 mL. FLIP Topography was        performed using stepwise distensions and demonstrated repetitive        antegrade contractions (RAC). The catheter was deflated and withdrawn.        The BRAVO capsule with delivery system was introduced through the mouth        and advanced into the esophagus, such that the BRAVO pH capsule was        positioned 31 cm from the incisors, which was 6 cm proximal to the GE        junction. Suction was applied to the well of the BRAVO pH capsule to        suck in the adjacent mucosa of the esophagus using the external vacuum        pump for 30 seconds. The BRAVO pH capsule was then deployed by        depressing the plunger on top of the handle to advance the locking pin        into the mucosa, thereby attaching the capsule to the esophagus. The        plunger was then rotated a quarter turn clockwise to release the capsule        from the delivery system. The delivery system was then withdrawn.        Endoscopy was utilized for probe placement and diagnostic evaluation.        This was the second Bravo deployed. The first attempt did not actually        deploy the Bravo  capsule. The entire delivery system and capsule was        removed prior to insertion and successful deployment.        - See op note in Epic for fill volume data.        The entire examined stomach was normal.        The examined duodenum was normal.                                                                                     Impression:          - Normal esophagus.                        - Normal stomach.                        - Normal examined duodenum.                        - FLIP imaging performed, diagnostic of normal                        esophageal contractility.                        - The BRAVO pH capsule was positioned 31 cm from the                        incisors, which was 6 cm proximal to the GE junction.                        - No specimens collected.   Pathology Report:    Colonoscopy: 03/13/2014   Date:  Impression:  Pathology Report:     EndoFLIP directed at the LES  16cm (EF-322) balloon length):   Date: 9/14/21     16cm balloon  Balloon inflation Balloon pressure CSA (mm^2) DI (mm^2/mmHg) Dmin (mm) Compliance   30 (ladmark ID) 37.3   10.95 22     40 50   10.03 25.3     50 25.3   6.87 19.9     60 62.1   6.01 21.8     70 75   5.37 22.7                    Normal RACs    High Resolution Manometry:  Date:  Impression:    PH/Impedance:  Date:  Impression:     Bravo:  96hr  Date: 9/14/21  Impression:    CT:  Date:  Impression:    Esophagram:  Date: 04/15/2021                                                                Impression:  1. No penetration or aspiration. Please see the speech pathologist  report for further details.  2. Gastroesophageal reflux.     Prior medical records were reviewed including, but not limited to, notes from referring providers, lab work, radiographic tests, and other diagnostic tests. Pertinent results were summarized above.     History     Past Medical History:   Diagnosis Date     PONV (postoperative nausea and vomiting)      Sleep apnea        Past Surgical  History:   Procedure Laterality Date     ESOPHAGEAL BALLOON PROVOCATION STUDY N/A 9/14/2021    Procedure: Esophageal Balloon Provocation Study;  Surgeon: Jhon Chavez DO;  Location:  GI     TONSILLECTOMY ADULT Bilateral 11/13/2019    Procedure: Bilateral Tonsillectomy;  Surgeon: Evita Hampton MD;  Location: [facility] OR     TURBINECTOMY Bilateral 11/13/2019    Procedure: Turbinectomy;  Surgeon: Evita Hampton MD;  Location:  OR     UVULOPALATOPHARYNGOPLASTY N/A 11/13/2019    Procedure: Uvulopalatopharyngoplasty, bilateral tonsilectomy and bilateral inferior turbinoplasty;  Surgeon: Evita Hampton MD;  Location:  OR       Social History     Socioeconomic History     Marital status:      Spouse name: Not on file     Number of children: Not on file     Years of education: Not on file     Highest education level: Not on file   Occupational History     Not on file   Tobacco Use     Smoking status: Former Smoker     Packs/day: 0.00     Smokeless tobacco: Never Used     Tobacco comment: quit 5 years ago   Vaping Use     Vaping Use: Never used   Substance and Sexual Activity     Alcohol use: Not Currently     Comment: occationally     Drug use: Never     Sexual activity: Not on file   Other Topics Concern     Parent/sibling w/ CABG, MI or angioplasty before 65F 55M? Not Asked   Social History Narrative     Not on file     Social Determinants of Health     Financial Resource Strain:      Difficulty of Paying Living Expenses:    Food Insecurity:      Worried About Running Out of Food in the Last Year:      Ran Out of Food in the Last Year:    Transportation Needs:      Lack of Transportation (Medical):      Lack of Transportation (Non-Medical):    Physical Activity:      Days of Exercise per Week:      Minutes of Exercise per Session:    Stress:      Feeling of Stress :    Social Connections:      Frequency of Communication with Friends and Family:      Frequency of Social Gatherings  with Friends and Family:      Attends L[employer] Services:      Active Member of Clubs or Organizations:      Attends Club or Organization Meetings:      Marital Status:    Intimate Partner Violence:      Fear of Current or Ex-Partner:      Emotionally Abused:      Physically Abused:      Sexually Abused:        No family history on file.    Medications and Allergies:     Outpatient Encounter Medications as of 10/29/2021   Medication Sig Dispense Refill     amLODIPine (NORVASC) 5 MG tablet Take 5 mg by mouth daily       atorvastatin (LIPITOR) 40 MG tablet Take 40 mg by mouth every 24 hours       DULoxetine (CYMBALTA) 60 MG capsule Take 120 mg by mouth       losartan (COZAAR) 100 MG tablet Take 100 mg by mouth daily       omeprazole (PRILOSEC) 40 MG DR capsule Take 1 capsule (40 mg) by mouth 2 times daily 180 capsule 3     pregabalin (LYRICA) 75 MG capsule Take 75 mg by mouth       semaglutide (OZEMPIC, 0.25 OR 0.5 MG/DOSE,) 2 MG/1.5ML SOPN pen Inject 0.5 mg Subcutaneous       TRAZODONE HCL PO Take 100 mg by mouth At Bedtime        triamcinolone (KENALOG) 0.1 % external cream        albuterol (PROVENTIL HFA) 108 (90 Base) MCG/ACT inhaler Inhale 2 puffs into the lungs (Patient not taking: Reported on 10/29/2021)       ALPRAZolam (XANAX PO) Take 1 mg by mouth as needed for anxiety  (Patient not taking: Reported on 10/29/2021)       estradiol (ESTRACE) 0.1 MG/GM vaginal cream Place 1 g vaginally (Patient not taking: Reported on 10/29/2021)       fluticasone (FLONASE) 50 MCG/ACT nasal spray Spray 1 spray in nostril       [DISCONTINUED] lamoTRIgine (LAMICTAL) 25 MG chewable tablet Take 2 tablets (50 mg) by mouth daily for 14 days 28 tablet 0     [DISCONTINUED] pravastatin (PRAVACHOL) 40 MG tablet   1     No facility-administered encounter medications on file as of 10/29/2021.        Allergies   Allergen Reactions     Diflucan [Fluconazole]      Mouth sores     Sulfa Drugs Nausea and Vomiting        Review of systems:  A  full 10 point review of systems was obtained and was negative except for the pertinent positives and negatives stated within the HPI.    Objective Findings:   Physical Exam:    Constitutional: There were no vitals taken for this visit.  General: Alert, cooperative, no distress, well-appearing  Head: Atraumatic, normocephalic, no obvious abnormalities   Eyes: EOMI, Sclera anicteric, no obvious conjunctival hemorrhage   Nose: Nares normal, no obvious malformation, no obvious rhinorrhea   Respiratory: normal appearing respirations, no cough  Musculoskeletal: Range of motion intact, no obvious strength deficit  Skin: No jaundice, no obvious rash  Neurologic: AAOx3, no obvious neurologic abnormality  Psychiatric: Normal Affect, appropriate mood  Extremities: No obvious edema, no obvious malformation     Labs, Radiology, Pathology     No results found for: WBC, HGB, PLT, CHOL, TRIG, HDL, LDLDIRECT, ALT, AST, NA, CREATININE, BUN, CO2, TSH, INR, GLUF     Liver Function Studies - No results for input(s): PROTTOTAL, ALBUMIN, BILITOTAL, ALKPHOS, AST, ALT, BILIDIRECT in the last 02414 hours.       Patient Active Problem List    Diagnosis Date Noted     Gastroesophageal reflux disease without esophagitis 04/29/2021     Priority: Medium     Globus sensation 04/29/2021     Priority: Medium     Heartburn 04/29/2021     Priority: Medium     Obstructive sleep apnea 11/15/2019     Priority: Medium     NEERU (obstructive sleep apnea) 11/13/2019     Priority: Medium     Major depressive disorder, recurrent episode, severe (H) 08/08/2014     Priority: Medium     updating diagnosis code for icd10 cutover        Assessment and Plan   Assessment:    Tessie Mcdaniels is a 55 year old female with PMH of IBS, diverticulitis, anxiety, depression, and obstructive sleep apnea s/p uvulopalatopharyngoplasty and turbinate surgery who is presenting as a follow up patient in consultation at the request of Dr. Salazar with a chief complaint of globus  sensation.    The patient was seen for her globus sensation and phlegm. She has undergone an endoscopy which was normal, endoFLIP which showed normal RACs and a normal DI, as well as a 96hr Bravo OFF therapy. The Olivares was positive for acid reflux disease based on acid exposure time. Currently she is on BID PPI with no change in her symptoms of globus. This could mean that her symptoms are either not related to her reflux or they are related to non-acid reflux disease as her acid should be adequately controlled on BID omeprazole. Her globus could also be related to hypervigilance.     Another possible explanation is that her morning phlegm is related to her CPAP and nasal/oral secretions at night being pushed down overnight.     She will arrange an appointment with her sleep apnea specialist to determine if there is any risk in a short term cessation of CPAP to determine if her AM phlegm and globus resolve. If so, perhaps there could be a different solution for her sleep apnea.     We also discussed lifestyle and dietary modification including elevation of the head of the bed, not eating within 4 hours of going to bed, and weight loss to reduce the amount of reflux.     Lastly, following the outcome of her potential CPAP cessation trial we will likely decrease the dose of her omeprazole to once daily to provide acid suppression in light of her Bravo. Long term BID PPI is likely unnecessary. That being said, it is likely still safe and we had a long conversation discussing that.     The risks and benefits of PPI use were discussed including but not limited to: kidney injury, dementia, fracture, C. Difficile, and community acquired pneumonia.  Recent literature suggests a possible relationship between PPI use with a greater likelihood of reporting a positive COVID-19 test (Bernadine CV, Janeth WD, Humphrey BMR. Am J Gastroenterol 2020;115:0708-3039).This was discussed with the patient in detail. The quality of research  studies was described (retrospective vs prospective). Data from recent review article provided (Scott DELVALLE, Rita PO. Proton Pump Inhibitors in 2021: Pros, Cons, and Everything in Between. [employer]. May 2021. Doi:10.1177/[id number]). The value of joint decision making was emphasized to ensure that the medication is utilized in the appropriate clinical setting.     1. Globus sensation    2. NEERU (obstructive sleep apnea)    3. Severe episode of recurrent major depressive disorder, without psychotic features (H)    4. Obstructive sleep apnea    5. Heartburn    6. Gastroesophageal reflux disease without esophagitis       No orders of the defined types were placed in this encounter.    Plan:  1. Please follow up with your sleep apnea specialist to determine if it is safe to undergo a trial OFF of your CPAP to determine if your morning phlegm and globus are related to the CPAP.   2. The symptoms of globus could be related to non-acid reflux in light of your high dose PPI which should adequately suppress the acid.    3. In the future you can meet with our GI clinical psychologist Dr. Imelda Kumar, to discuss esophageal hypervigilance as a possible mechanism for your persistent globus sensation.  4. At your next visit we will discuss decreasing the dose of your omeprazole to 40mg once daily  5. Follow-up in 3 months      Follow up plan:   Return to clinic 3 months and as needed.    The risks and benefits of my recommendations, as well as other treatment options were discussed with the patient and any available family today. All questions were answered.     o Follow up: As planned above. Today, I personally spent 30 minutes in direct face to face time with the patient, of which greater than 50% of the time was spent in patient education and counseling as described above. Approximately 20 minutes were spent on indirect care associated with the patient's consultation including but not limited to review of: patient medical  records to date, clinic visits, hospital records, lab results, imaging studies, procedural documentation, and coordinating care with other providers. The findings from this review are summarized in the above note.      Jhon Chavez DO   of Medicine  Division of Gastroenterology, Hepatology, and Nutrition  [facility]     Documentation assisted by voice recognition and documentation system.

## 2021-10-29 NOTE — PATIENT INSTRUCTIONS
It was a pleasure taking care of you today.  I've included a brief summary of our discussion and care plan from today's visit below.  Please review this information with your primary care provider.  _______________________________________________________________________    My recommendations are summarized as follows:    1. Please follow up with your sleep apnea specialist to determine if it is safe to undergo a trial OFF of your CPAP to determine if your morning phlegm and globus are related to the CPAP.   2. The symptoms of globus could be related to non-acid reflux in light of your high dose PPI which should adequately suppress the acid.    3. In the future you can meet with our GI clinical psychologist Dr. Imelda Kumar, to discuss esophageal hypervigilance as a possible mechanism for your persistent globus sensation.  4. At your next visit we will discuss decreasing the dose of your omeprazole to 40mg once daily  5. Follow-up in 3 months    To schedule endoscopic procedures you may call: [phone]  To schedule radiology tests you may call: [phone]  To schedule an ENT appointment you may call: [phone]    Please call my nurse Lorna ([phone]), Giacomo ([phone]) with any questions or concerns.  If you were seen through the [facility] please feel free to reach out to Shara at [phone]   --    Return to GI Clinic in 3 months to review your progress.    _______________________________________________________________________    Who do I call with any questions after my visit?  Please be in touch if there are any further questions that arise following today's visit.  There are multiple ways to contact your gastroenterology care team.        During business hours, you may reach a Gastroenterology nurse at [phone] and choose option 3.         To schedule or reschedule an appointment, please call [phone].       You can always send a secure message through [employer].  [employer]  are answered by your nurse or doctor typically within 24 hours.  Please allow extra time on weekends and holidays.        For urgent/emergent questions after business hours, you may reach the on-call GI Fellow by contacting the [facility]  at [phone].     How will I get the results of any tests ordered?    You will receive all of your results.  If you have signed up for [employer], any tests ordered at your visit will be available to you after your physician reviews them.  Typically this takes 1-2 weeks.  If there are urgent results that require a change in your care plan, your physician or nurse will call you to discuss the next steps.      What is [employer]?  [employer] is a secure way for you to access all of your healthcare records from the [facility].  It is a web based computer program, so you can sign on to it from any location.  It also allows you to send secure messages to your care team.  I recommend signing up for [employer] access if you have not already done so and are comfortable with using a computer.      How to I schedule a follow-up visit?  If you did not schedule a follow-up visit today, please call [phone] to schedule a follow-up office visit.        Sincerely,    Jhon Chavez DO   of Medicine  Division of Gastroenterology, Hepatology, and Nutrition  [facility]

## 2022-05-07 ENCOUNTER — HEALTH MAINTENANCE LETTER (OUTPATIENT)
Age: 57
End: 2022-05-07

## 2023-01-07 ENCOUNTER — HEALTH MAINTENANCE LETTER (OUTPATIENT)
Age: 58
End: 2023-01-07

## 2023-01-23 ENCOUNTER — OFFICE VISIT (OUTPATIENT)
Dept: DERMATOLOGY | Facility: CLINIC | Age: 58
End: 2023-01-23
Payer: COMMERCIAL

## 2023-01-23 DIAGNOSIS — I87.2 STASIS DERMATITIS OF BOTH LEGS: ICD-10-CM

## 2023-01-23 DIAGNOSIS — L30.4 INTERTRIGO: Primary | ICD-10-CM

## 2023-01-23 PROCEDURE — 99203 OFFICE O/P NEW LOW 30 MIN: CPT | Performed by: DERMATOLOGY

## 2023-01-23 RX ORDER — BETAMETHASONE DIPROPIONATE 0.5 MG/G
CREAM TOPICAL
Qty: 200 G | Refills: 3 | Status: SHIPPED | OUTPATIENT
Start: 2023-01-23 | End: 2024-07-11

## 2023-01-23 ASSESSMENT — PAIN SCALES - GENERAL: PAINLEVEL: NO PAIN (0)

## 2023-01-23 NOTE — PATIENT INSTRUCTIONS
Please go to drug store and get compression stockings.   I would also like you to take the following:     (2) Claritin in the morning  (2) zyrtec at bedtime    To help get itching under control.     I sent a cream for you to start using. Proper skin care from Dr. Tolbert- [facility]     Eliminate harsh soaps, i.e. Dial, Zest, Sandra Spring;   Use mild soaps such as Cetaphil or Dove Sensitive Skin   Avoid hot or cold showers   After showering, lightly dry off.    Aggressive use of a moisturizer (including Vanicream, Cetaphil, Aquaphor or Cerave)   We recommend using a tub that needs to be scooped out, not a pump. This has more of an oil base. It will hold moisture in your skin much better than a water base moisturizer. The ones recommended are non- pore clogging.    To reduce swelling in the leg:  Don't stand for long periods.   Take regular walks.   Elevate your feet when sitting: if your legs are swollen they need to be above your hips to drain effectively.   Elevate the foot of your bed overnight.   Once the dermatitis is under control, wear special graduated compression stockings long term.    To treat the dermatitis:   Dry up oozing patches with dilute vinegar on gauze as compresses.   Topical Triamcinolone cream daily  Return to clinic in 2 weeks. (Stop at desk for appointment)  Use vanicream daily.  Try not to scratch: it keeps the dermatitis going.   Protect your skin from injury: this can result in infection or ulceration.  Vinegar is 5% acetic acid. Make a 1% solution by adding   cup of vinegar (white or brown) to 1 pint of water.   You can use OTC athlete cream for under breast.     Please follow-up in 2 months.    If you have any questions call [phone] and follow the prompts to Dr. Tolbert's office.

## 2023-01-23 NOTE — NURSING NOTE
Tessie Mcdaniels's chief complaint for this visit includes:  Chief Complaint   Patient presents with     Derm Problem     Patient has a rash that comes and goes on abdomen and legs and under breast. Patient has tried clobetasol that usually helps but it is not fixing it.      PCP: Jammie Ramos    Referring Provider:  Referred Self, MD  No address on file    There were no vitals taken for this visit.  No Pain (0)        Allergies   Allergen Reactions     Diflucan [Fluconazole]      Mouth sores     Sulfa Drugs Nausea and Vomiting         Do you need any medication refills at today's visit? NO    Rox Donis MA

## 2023-01-23 NOTE — LETTER
1/23/2023         RE: Tessie Mcdaniels  [address]  [facility] [address]        Dear Colleague,    Thank you for referring your patient, Tessie Mcdaniels, to the [facility]. Please see a copy of my visit note below.    Tessie Mcdaniels is an extremely pleasant 57 year old year old female patient here today for rash on legs and umbilicus and breasts.   .   Patient states this has been present for a while.  Patient reports the following symptoms:  itching.  Patient reports the following previous treatments clobetasol.  These treatments did not work.  Patient reports the following modifying factors none.  Associated symptoms: none.  Patient has no other skin complaints today.  Remainder of the HPI, Meds, PMH, Allergies, FH, and SH was reviewed in chart.      Past Medical History:   Diagnosis Date     PONV (postoperative nausea and vomiting)      Sleep apnea        Past Surgical History:   Procedure Laterality Date     ESOPHAGEAL BALLOON PROVOCATION STUDY N/A 9/14/2021    Procedure: Esophageal Balloon Provocation Study;  Surgeon: Jhon Chavez DO;  Location:  GI     TONSILLECTOMY ADULT Bilateral 11/13/2019    Procedure: Bilateral Tonsillectomy;  Surgeon: Evita Hampton MD;  Location:  OR     TURBINECTOMY Bilateral 11/13/2019    Procedure: Turbinectomy;  Surgeon: Evita Hampton MD;  Location:  OR     UVULOPALATOPHARYNGOPLASTY N/A 11/13/2019    Procedure: Uvulopalatopharyngoplasty, bilateral tonsilectomy and bilateral inferior turbinoplasty;  Surgeon: Evita Hampton MD;  Location:  OR        History reviewed. No pertinent family history.    Social History     Socioeconomic History     Marital status:      Spouse name: Not on file     Number of children: Not on file     Years of education: Not on file     Highest education level: Not on file   Occupational History     Not on file   Tobacco Use     Smoking status: Former     Packs/day: 0.00      Types: Cigarettes     Smokeless tobacco: Never     Tobacco comments:     quit 5 years ago   Vaping Use     Vaping Use: Never used   Substance and Sexual Activity     Alcohol use: Not Currently     Comment: occationally     Drug use: Never     Sexual activity: Not on file   Other Topics Concern     Parent/sibling w/ CABG, MI or angioplasty before 65F 55M? Not Asked   Social History Narrative     Not on file     Social Determinants of Health     Financial Resource Strain: Not on file   Food Insecurity: Not on file   Transportation Needs: Not on file   Physical Activity: Not on file   Stress: Not on file   Social Connections: Not on file   Intimate Partner Violence: Not on file   Housing Stability: Not on file       Outpatient Encounter Medications as of 1/23/2023   Medication Sig Dispense Refill     amLODIPine (NORVASC) 5 MG tablet Take 5 mg by mouth daily       atorvastatin (LIPITOR) 40 MG tablet Take 40 mg by mouth every 24 hours       DULoxetine (CYMBALTA) 60 MG capsule Take 120 mg by mouth       fluticasone (FLONASE) 50 MCG/ACT nasal spray Spray 1 spray in nostril       losartan (COZAAR) 100 MG tablet Take 100 mg by mouth daily       pregabalin (LYRICA) 75 MG capsule Take 75 mg by mouth       semaglutide (OZEMPIC, 0.25 OR 0.5 MG/DOSE,) 2 MG/1.5ML SOPN pen Inject 0.5 mg Subcutaneous       TRAZODONE HCL PO Take 100 mg by mouth At Bedtime        albuterol (PROAIR HFA/PROVENTIL HFA/VENTOLIN HFA) 108 (90 Base) MCG/ACT inhaler Inhale 2 puffs into the lungs (Patient not taking: Reported on 10/29/2021)       ALPRAZolam (XANAX PO) Take 1 mg by mouth as needed for anxiety  (Patient not taking: Reported on 10/29/2021)       estradiol (ESTRACE) 0.1 MG/GM vaginal cream Place 1 g vaginally (Patient not taking: Reported on 10/29/2021)       triamcinolone (KENALOG) 0.1 % external cream  (Patient not taking: Reported on 1/23/2023)       No facility-administered encounter medications on file as of 1/23/2023.             O:    NAD, WDWN, Alert & Oriented, Mood & Affect wnl, Vitals stable   Here today alone   General appearance normal   Vitals stable   Alert, oriented and in no acute distress     Eczematous plaques on legs and trunk  Under breasts macerated red plaques  1+ edema      Eyes: Conjunctivae/lids:Normal     ENT: Lips, buccal mucosa, tongue: normal    MSK:Normal    Cardiovascular: peripheral edema 1+    Pulm: Breathing Normal    Neuro/Psych: Orientation:Alert and Orientedx3 ; Mood/Affect:normal       A/P:  1. Stasis derm  To reduce swelling in the leg:    Don't stand for long periods.     Take regular walks.     Elevate your feet when sitting: if your legs are swollen they need to be above your hips to drain effectively.     Elevate the foot of your bed overnight.     Once the dermatitis is under control, wear special graduated compression stockings long term.    To treat the dermatitis:     claritin in am, zyrtec bedtime    Topical betamethasone cream daily    Return to clinic in 8 weeks. (Stop at desk for appointment)    Use vanicream daily.    Try not to scratch: it keeps the dermatitis going.     Protect your skin from injury: this can result in infection or ulceration.    Vinegar is 5% acetic acid. Make a 1% solution by adding   cup of vinegar (white or brown) to 1 pint of water.   2.intertrigo  Antifungal powder daily  It was a pleasure speaking to Tessie Mcdaniels today.  Previous clinic notes and pertinent laboratory tests were reviewed prior to Tessie Mcdaneils's visit.        Again, thank you for allowing me to participate in the care of your patient.        Sincerely,        Ash Tolbert MD

## 2023-01-23 NOTE — PROGRESS NOTES
Tessie Mcdaniels is an extremely pleasant 57 year old year old female patient here today for rash on legs and umbilicus and breasts.   .   Patient states this has been present for a while.  Patient reports the following symptoms:  itching.  Patient reports the following previous treatments clobetasol.  These treatments did not work.  Patient reports the following modifying factors none.  Associated symptoms: none.  Patient has no other skin complaints today.  Remainder of the HPI, Meds, PMH, Allergies, FH, and SH was reviewed in chart.      Past Medical History:   Diagnosis Date     PONV (postoperative nausea and vomiting)      Sleep apnea        Past Surgical History:   Procedure Laterality Date     ESOPHAGEAL BALLOON PROVOCATION STUDY N/A 9/14/2021    Procedure: Esophageal Balloon Provocation Study;  Surgeon: Jhon Chavez DO;  Location:  GI     TONSILLECTOMY ADULT Bilateral 11/13/2019    Procedure: Bilateral Tonsillectomy;  Surgeon: Evita Hampton MD;  Location: [facility] OR     TURBINECTOMY Bilateral 11/13/2019    Procedure: Turbinectomy;  Surgeon: Evita Hampton MD;  Location:  OR     UVULOPALATOPHARYNGOPLASTY N/A 11/13/2019    Procedure: Uvulopalatopharyngoplasty, bilateral tonsilectomy and bilateral inferior turbinoplasty;  Surgeon: Evita Hampton MD;  Location:  OR        History reviewed. No pertinent family history.    Social History     Socioeconomic History     Marital status:      Spouse name: Not on file     Number of children: Not on file     Years of education: Not on file     Highest education level: Not on file   Occupational History     Not on file   Tobacco Use     Smoking status: Former     Packs/day: 0.00     Types: Cigarettes     Smokeless tobacco: Never     Tobacco comments:     quit 5 years ago   Vaping Use     Vaping Use: Never used   Substance and Sexual Activity     Alcohol use: Not Currently     Comment: occationally     Drug use: Never     Sexual  activity: Not on file   Other Topics Concern     Parent/sibling w/ CABG, MI or angioplasty before 65F 55M? Not Asked   Social History Narrative     Not on file     Social Determinants of Health     Financial Resource Strain: Not on file   Food Insecurity: Not on file   Transportation Needs: Not on file   Physical Activity: Not on file   Stress: Not on file   Social Connections: Not on file   Intimate Partner Violence: Not on file   Housing Stability: Not on file       Outpatient Encounter Medications as of 1/23/2023   Medication Sig Dispense Refill     amLODIPine (NORVASC) 5 MG tablet Take 5 mg by mouth daily       atorvastatin (LIPITOR) 40 MG tablet Take 40 mg by mouth every 24 hours       DULoxetine (CYMBALTA) 60 MG capsule Take 120 mg by mouth       fluticasone (FLONASE) 50 MCG/ACT nasal spray Spray 1 spray in nostril       losartan (COZAAR) 100 MG tablet Take 100 mg by mouth daily       pregabalin (LYRICA) 75 MG capsule Take 75 mg by mouth       semaglutide (OZEMPIC, 0.25 OR 0.5 MG/DOSE,) 2 MG/1.5ML SOPN pen Inject 0.5 mg Subcutaneous       TRAZODONE HCL PO Take 100 mg by mouth At Bedtime        albuterol (PROAIR HFA/PROVENTIL HFA/VENTOLIN HFA) 108 (90 Base) MCG/ACT inhaler Inhale 2 puffs into the lungs (Patient not taking: Reported on 10/29/2021)       ALPRAZolam (XANAX PO) Take 1 mg by mouth as needed for anxiety  (Patient not taking: Reported on 10/29/2021)       estradiol (ESTRACE) 0.1 MG/GM vaginal cream Place 1 g vaginally (Patient not taking: Reported on 10/29/2021)       triamcinolone (KENALOG) 0.1 % external cream  (Patient not taking: Reported on 1/23/2023)       No facility-administered encounter medications on file as of 1/23/2023.             O:   NAD, WDWN, Alert & Oriented, Mood & Affect wnl, Vitals stable   Here today alone   General appearance normal   Vitals stable   Alert, oriented and in no acute distress     Eczematous plaques on legs and trunk  Under breasts macerated red plaques  1+  edema      Eyes: Conjunctivae/lids:Normal     ENT: Lips, buccal mucosa, tongue: normal    MSK:Normal    Cardiovascular: peripheral edema 1+    Pulm: Breathing Normal    Neuro/Psych: Orientation:Alert and Orientedx3 ; Mood/Affect:normal       A/P:  1. Stasis derm  To reduce swelling in the leg:    Don't stand for long periods.     Take regular walks.     Elevate your feet when sitting: if your legs are swollen they need to be above your hips to drain effectively.     Elevate the foot of your bed overnight.     Once the dermatitis is under control, wear special graduated compression stockings long term.    To treat the dermatitis:     claritin in am, zyrtec bedtime    Topical betamethasone cream daily    Return to clinic in 8 weeks. (Stop at desk for appointment)    Use vanicream daily.    Try not to scratch: it keeps the dermatitis going.     Protect your skin from injury: this can result in infection or ulceration.    Vinegar is 5% acetic acid. Make a 1% solution by adding   cup of vinegar (white or brown) to 1 pint of water.   2.intertrigo  Antifungal powder daily  It was a pleasure speaking to Tessie Mcdaniels today.  Previous clinic notes and pertinent laboratory tests were reviewed prior to Tessie Mcdaniels's visit.     (4) rarely moist

## 2023-01-24 ENCOUNTER — TELEPHONE (OUTPATIENT)
Dept: DERMATOLOGY | Facility: CLINIC | Age: 58
End: 2023-01-24

## 2023-01-24 NOTE — TELEPHONE ENCOUNTER
[facility] Call Center    Phone Message    May a detailed message be left on voicemail: yes     Reason for Call: Pt was in for Appt 01/23/23 and would like to discuss a few questions. Please call pt back to discuss more.     1) socks right away - after it is under control (when to ware the socks)?  Appt notes state: Once the dermatitis is under control, wear special graduated compression stockings long term.    2) Use vanicream daily. - 4 different kinds (pt bought one of the others)    3) augmented betamethasone dipropionate (DIPROLENE AF) 0.05 % external cream Use daily?   triamcinolone - cream daily ?    Thanks       Action Taken: Message routed to:  [facility] ([facility] Derm    Travel Screening: Not Applicable

## 2023-01-25 NOTE — TELEPHONE ENCOUNTER
Spoke with pt and reviewed providers message. Reviewed with pt her discharge instructions and answered all questions in regards to medications. Pt verbalized understanding.  Karol MCDANIEL RN BSN PHN  Specialty Clinics

## 2023-01-25 NOTE — CONFIDENTIAL NOTE
1. patient needs to know how long to wear the compression stockings- all day and off at night? Or 24/7?   And for how many days at a time    2. Why is she having the swelling? Does she need to follow up with pcp for the swelling?    3. Your avs says to use triamcinolone daily but she was not prescribed that- you wrote for betamethasone - should she be using both?        Provider note says    A/P:  1. Stasis derm  To reduce swelling in the leg:    Don't stand for long periods.     Take regular walks.     Elevate your feet when sitting: if your legs are swollen they need to be above your hips to drain effectively.     Elevate the foot of your bed overnight.     Once the dermatitis is under control, wear special graduated compression stockings long term.    To treat the dermatitis:     claritin in am, zyrtec bedtime    Topical betamethasone cream daily    Return to clinic in 8 weeks. (Stop at desk for appointment)    Use vanicream daily.    Try not to scratch: it keeps the dermatitis going.     Protect your skin from injury: this can result in infection or ulceration.    Vinegar is 5% acetic acid. Make a 1% solution by adding   cup of vinegar (white or brown) to 1 pint of water.   2.intertrigo  Antifungal powder daily  It was a pleasure speaking to Tessie Mcdaniels today.      Instructions       Return in about 2 months (around 3/23/2023) for Follow up.  Please go to drug store and get compression stockings.   I would also like you to take the following:      (2) Claritin in the morning  (2) zyrtec at bedtime     To help get itching under control.      I sent a cream for you to start using. Proper skin care from Dr. Tolbert- [facility]                  Eliminate harsh soaps, i.e. Dial, Zest, [place];             Use mild soaps such as Cetaphil or Dove Sensitive Skin             Avoid hot or cold showers             After showering, lightly dry off.              Aggressive use of a moisturizer (including  Vanicream, Cetaphil, Aquaphor or Cerave)   We recommend using a tub that needs to be scooped out, not a pump. This has more of an oil base. It will hold moisture in your skin much better than a water base moisturizer. The ones recommended are non- pore clogging.     To reduce swelling in the leg:    Don't stand for long periods.     Take regular walks.     Elevate your feet when sitting: if your legs are swollen they need to be above your hips to drain effectively.     Elevate the foot of your bed overnight.     Once the dermatitis is under control, wear special graduated compression stockings long term.    To treat the dermatitis:     Dry up oozing patches with dilute vinegar on gauze as compresses.     Topical Triamcinolone cream daily    Return to clinic in 2 weeks. (Stop at desk for appointment)    Use vanicream daily.    Try not to scratch: it keeps the dermatitis going.     Protect your skin from injury: this can result in infection or ulceration.    Vinegar is 5% acetic acid. Make a 1% solution by adding   cup of vinegar (white or brown) to 1 pint of water.   You can use OTC athlete cream for under breast.      Please follow-up in 2 months.                    Ok to leave a detailed message on voice mail    Please advise        Thank you,    Isabelle BASILIORN BSN  [facility]- [phone]     Field Size (Applicator): 1.5 cm

## 2023-01-25 NOTE — TELEPHONE ENCOUNTER
1. Compression all day except when she is at home at night  2. I would follow up with pcp  3. Betamethasone

## 2023-01-26 ENCOUNTER — TELEPHONE (OUTPATIENT)
Dept: DERMATOLOGY | Facility: CLINIC | Age: 58
End: 2023-01-26
Payer: COMMERCIAL

## 2023-01-26 NOTE — TELEPHONE ENCOUNTER
[facility] Call Center    Phone Message    May a detailed message be left on voicemail: yes     Reason for Call: Other: Per Pt Tessie/ rash is spreading on stomach, hands, and legs/ Please call to advise if an alternative cream is needed. Recs [facility]--Epic     Action Taken: Message routed to:  Other: [facility]    Travel Screening: Not Applicable

## 2023-01-26 NOTE — TELEPHONE ENCOUNTER
Is she moisturizing twice daily with cerave or cetaphil?  Using betamethasone twice daily to areas of rash?

## 2023-01-26 NOTE — TELEPHONE ENCOUNTER
"Spoke to patient. Diagnosed with Stasis dermatitis and Intertrigo on 1-23-23 by Dr. Tolbert.    \"There are red, round bumps on my neck, stomach and hands now  that are very itchy and I can't take it.This is the stuff on my legs that were the main diagnosis but it is on my hands and my neck now and it looks the same as the stuff on my legs..\"     \"I took 2 Claritin this am and 2 -Zyrtec last night. I am doing the vinegar dabbing and it looks the same as what is on my legs. The under the breasts is fine.\"       I asked if patient could send a photo via My chart? She is not at home and is not sure she can send a photo.    I did advise she may need a different treatment or recheck appt or skin biopsy.    She is asking if Prednisone would an option? If so, send to [facility].    Please advise. I did advise her an rx may or may not be appropriate and to check with pharmacy later to see if any medication was sent.       Otherwise, continue meds/plan per Dr. Tolbert dictation which I did review with her as well. Nani Oleary RN             "

## 2023-05-10 ENCOUNTER — MYC REFILL (OUTPATIENT)
Dept: DERMATOLOGY | Facility: CLINIC | Age: 58
End: 2023-05-10

## 2023-05-10 ENCOUNTER — E-VISIT (OUTPATIENT)
Dept: URGENT CARE | Facility: CLINIC | Age: 58
End: 2023-05-10
Payer: COMMERCIAL

## 2023-05-10 DIAGNOSIS — L30.9 ECZEMA, UNSPECIFIED TYPE: Primary | ICD-10-CM

## 2023-05-10 DIAGNOSIS — L30.9 DERMATITIS: ICD-10-CM

## 2023-05-10 PROCEDURE — 99421 OL DIG E/M SVC 5-10 MIN: CPT | Performed by: PHYSICIAN ASSISTANT

## 2023-05-10 RX ORDER — TRIAMCINOLONE ACETONIDE 1 MG/G
OINTMENT TOPICAL 2 TIMES DAILY
Qty: 80 G | Refills: 1 | Status: SHIPPED | OUTPATIENT
Start: 2023-05-10 | End: 2024-07-11

## 2023-05-11 RX ORDER — PREDNISONE 10 MG/1
TABLET ORAL
Qty: 30 TABLET | Refills: 0 | Status: SHIPPED | OUTPATIENT
Start: 2023-05-11 | End: 2024-01-31

## 2023-05-11 NOTE — TELEPHONE ENCOUNTER
See separate My chart message with photos and patient reply.    Patient requested this refill from last ordering Provider-Johanna Huynh PA-C, but saw Dr. Tolbert last.    Nani Oleary RN

## 2023-05-15 ENCOUNTER — OFFICE VISIT (OUTPATIENT)
Dept: DERMATOLOGY | Facility: CLINIC | Age: 58
End: 2023-05-15
Payer: COMMERCIAL

## 2023-05-15 DIAGNOSIS — I87.2 STASIS DERMATITIS OF BOTH LEGS: Primary | ICD-10-CM

## 2023-05-15 DIAGNOSIS — L30.4 INTERTRIGO: ICD-10-CM

## 2023-05-15 PROCEDURE — 99213 OFFICE O/P EST LOW 20 MIN: CPT | Performed by: DERMATOLOGY

## 2023-05-15 RX ORDER — DESOXIMETASONE 2.5 MG/G
OINTMENT TOPICAL 2 TIMES DAILY
Qty: 100 G | Refills: 6 | Status: SHIPPED | OUTPATIENT
Start: 2023-05-15 | End: 2024-09-26

## 2023-05-15 ASSESSMENT — PAIN SCALES - GENERAL: PAINLEVEL: NO PAIN (0)

## 2023-05-15 NOTE — LETTER
5/15/2023         RE: Tessie Mcdaniels  [address]  [place] [address]        Dear Colleague,    Thank you for referring your patient, Tessie Mcdaniels, to the [facility]. Please see a copy of my visit note below.    Tessie Mcdaniels is an extremely pleasant 58 year old year old female patient here today for f/u stasis and intertrigo.  Intertrigo clear.  She notes rash on legs cleared and came back. She is not wearing compression, trying to follow skin care.  Patient has no other skin complaints today.  Remainder of the HPI, Meds, PMH, Allergies, FH, and SH was reviewed in chart.      Past Medical History:   Diagnosis Date     PONV (postoperative nausea and vomiting)      Sleep apnea        Past Surgical History:   Procedure Laterality Date     ESOPHAGEAL BALLOON PROVOCATION STUDY N/A 9/14/2021    Procedure: Esophageal Balloon Provocation Study;  Surgeon: Jhon Chavez DO;  Location:  GI     TONSILLECTOMY ADULT Bilateral 11/13/2019    Procedure: Bilateral Tonsillectomy;  Surgeon: Evita Hampton MD;  Location: [facility] OR     TURBINECTOMY Bilateral 11/13/2019    Procedure: Turbinectomy;  Surgeon: Evita Hampton MD;  Location: [facility] OR     UVULOPALATOPHARYNGOPLASTY N/A 11/13/2019    Procedure: Uvulopalatopharyngoplasty, bilateral tonsilectomy and bilateral inferior turbinoplasty;  Surgeon: Evita Hampton MD;  Location:  OR        No family history on file.    Social History     Socioeconomic History     Marital status:      Spouse name: Not on file     Number of children: Not on file     Years of education: Not on file     Highest education level: Not on file   Occupational History     Not on file   Tobacco Use     Smoking status: Former     Packs/day: 0.00     Types: Cigarettes     Smokeless tobacco: Never     Tobacco comments:     quit 5 years ago   Vaping Use     Vaping status: Never Used   Substance and Sexual Activity     Alcohol use:  Not Currently     Comment: occationally     Drug use: Never     Sexual activity: Not on file   Other Topics Concern     Parent/sibling w/ CABG, MI or angioplasty before 65F 55M? Not Asked   Social History Narrative     Not on file     Social Determinants of Health     Financial Resource Strain: Not on file   Food Insecurity: Not on file   Transportation Needs: Not on file   Physical Activity: Not on file   Stress: Not on file   Social Connections: Not on file   Intimate Partner Violence: Not on file   Housing Stability: Not on file       Outpatient Encounter Medications as of 5/15/2023   Medication Sig Dispense Refill     albuterol (PROAIR HFA/PROVENTIL HFA/VENTOLIN HFA) 108 (90 Base) MCG/ACT inhaler Inhale 2 puffs into the lungs (Patient not taking: Reported on 10/29/2021)       ALPRAZolam (XANAX PO) Take 1 mg by mouth as needed for anxiety  (Patient not taking: Reported on 10/29/2021)       amLODIPine (NORVASC) 5 MG tablet Take 5 mg by mouth daily       atorvastatin (LIPITOR) 40 MG tablet Take 40 mg by mouth every 24 hours       augmented betamethasone dipropionate (DIPROLENE AF) 0.05 % external cream Apply sparingly to affected area twice daily as needed.  Do not apply to face. 200 g 3     DULoxetine (CYMBALTA) 60 MG capsule Take 120 mg by mouth       estradiol (ESTRACE) 0.1 MG/GM vaginal cream Place 1 g vaginally (Patient not taking: Reported on 10/29/2021)       fluticasone (FLONASE) 50 MCG/ACT nasal spray Spray 1 spray in nostril       losartan (COZAAR) 100 MG tablet Take 100 mg by mouth daily       predniSONE (DELTASONE) 10 MG tablet Take 4 tabs x 3 days, 3 tabs x 3 days, 2 tabs x 3 days, 1 tab x 3 days. 30 tablet 0     pregabalin (LYRICA) 75 MG capsule Take 75 mg by mouth       semaglutide (OZEMPIC, 0.25 OR 0.5 MG/DOSE,) 2 MG/1.5ML SOPN pen Inject 0.5 mg Subcutaneous       TRAZODONE HCL PO Take 100 mg by mouth At Bedtime        triamcinolone (KENALOG) 0.1 % external cream  (Patient not taking: Reported on  1/23/2023)       triamcinolone (KENALOG) 0.1 % external ointment Apply topically 2 times daily 80 g 1     No facility-administered encounter medications on file as of 5/15/2023.             O:   NAD, WDWN, Alert & Oriented, Mood & Affect wnl, Vitals stable   Here today alone    General appearance normal   Vitals stable   Alert, oriented and in no acute distress     Eczematous plaques on legs      Eyes: Conjunctivae/lids:Normal     ENT: Lips, buccal mucosa, tongue: normal    MSK:Normal    Cardiovascular: peripheral edema none    Pulm: Breathing Normal    Neuro/Psych: Orientation:Alert and Orientedx3 ; Mood/Affect:normal       A/P:  1. Intertrigo clear  2. Stasis  Compression discussed with patient  Emollient use discussed with patient   Switch to desoximetasone   Return to clinic 4 weeks  It was a pleasure speaking to Tessie Mcdaniels today.  Previous clinic notes and pertinent laboratory tests were reviewed prior to Tessie Mcdaniels's visit.        Again, thank you for allowing me to participate in the care of your patient.        Sincerely,        Ash Tolbert MD

## 2023-05-15 NOTE — PROGRESS NOTES
Tessie Mcdaniels is an extremely pleasant 58 year old year old female patient here today for f/u stasis and intertrigo.  Intertrigo clear.  She notes rash on legs cleared and came back. She is not wearing compression, trying to follow skin care.  Patient has no other skin complaints today.  Remainder of the HPI, Meds, PMH, Allergies, FH, and SH was reviewed in chart.      Past Medical History:   Diagnosis Date     PONV (postoperative nausea and vomiting)      Sleep apnea        Past Surgical History:   Procedure Laterality Date     ESOPHAGEAL BALLOON PROVOCATION STUDY N/A 9/14/2021    Procedure: Esophageal Balloon Provocation Study;  Surgeon: Jhon Chavez DO;  Location: [facility]     TONSILLECTOMY ADULT Bilateral 11/13/2019    Procedure: Bilateral Tonsillectomy;  Surgeon: Evita Hampton MD;  Location: [facility]     TURBINECTOMY Bilateral 11/13/2019    Procedure: Turbinectomy;  Surgeon: Evita Hampton MD;  Location: [facility]     UVULOPALATOPHARYNGOPLASTY N/A 11/13/2019    Procedure: Uvulopalatopharyngoplasty, bilateral tonsilectomy and bilateral inferior turbinoplasty;  Surgeon: Evita Hampton MD;  Location: [facility]        No family history on file.    Social History     Socioeconomic History     Marital status:      Spouse name: Not on file     Number of children: Not on file     Years of education: Not on file     Highest education level: Not on file   Occupational History     Not on file   Tobacco Use     Smoking status: Former     Packs/day: 0.00     Types: Cigarettes     Smokeless tobacco: Never     Tobacco comments:     quit 5 years ago   Vaping Use     Vaping status: Never Used   Substance and Sexual Activity     Alcohol use: Not Currently     Comment: occationally     Drug use: Never     Sexual activity: Not on file   Other Topics Concern     Parent/sibling w/ CABG, MI or angioplasty before 65F 55M? Not Asked   Social History Narrative     Not on file     Social Determinants of  Health     Financial Resource Strain: Not on file   Food Insecurity: Not on file   Transportation Needs: Not on file   Physical Activity: Not on file   Stress: Not on file   Social Connections: Not on file   Intimate Partner Violence: Not on file   Housing Stability: Not on file       Outpatient Encounter Medications as of 5/15/2023   Medication Sig Dispense Refill     albuterol (PROAIR HFA/PROVENTIL HFA/VENTOLIN HFA) 108 (90 Base) MCG/ACT inhaler Inhale 2 puffs into the lungs (Patient not taking: Reported on 10/29/2021)       ALPRAZolam (XANAX PO) Take 1 mg by mouth as needed for anxiety  (Patient not taking: Reported on 10/29/2021)       amLODIPine (NORVASC) 5 MG tablet Take 5 mg by mouth daily       atorvastatin (LIPITOR) 40 MG tablet Take 40 mg by mouth every 24 hours       augmented betamethasone dipropionate (DIPROLENE AF) 0.05 % external cream Apply sparingly to affected area twice daily as needed.  Do not apply to face. 200 g 3     DULoxetine (CYMBALTA) 60 MG capsule Take 120 mg by mouth       estradiol (ESTRACE) 0.1 MG/GM vaginal cream Place 1 g vaginally (Patient not taking: Reported on 10/29/2021)       fluticasone (FLONASE) 50 MCG/ACT nasal spray Spray 1 spray in nostril       losartan (COZAAR) 100 MG tablet Take 100 mg by mouth daily       predniSONE (DELTASONE) 10 MG tablet Take 4 tabs x 3 days, 3 tabs x 3 days, 2 tabs x 3 days, 1 tab x 3 days. 30 tablet 0     pregabalin (LYRICA) 75 MG capsule Take 75 mg by mouth       semaglutide (OZEMPIC, 0.25 OR 0.5 MG/DOSE,) 2 MG/1.5ML SOPN pen Inject 0.5 mg Subcutaneous       TRAZODONE HCL PO Take 100 mg by mouth At Bedtime        triamcinolone (KENALOG) 0.1 % external cream  (Patient not taking: Reported on 1/23/2023)       triamcinolone (KENALOG) 0.1 % external ointment Apply topically 2 times daily 80 g 1     No facility-administered encounter medications on file as of 5/15/2023.             O:   NAD, WDWN, Alert & Oriented, Mood & Affect wnl, Vitals  stable   Here today alone    General appearance normal   Vitals stable   Alert, oriented and in no acute distress     Eczematous plaques on legs      Eyes: Conjunctivae/lids:Normal     ENT: Lips, buccal mucosa, tongue: normal    MSK:Normal    Cardiovascular: peripheral edema none    Pulm: Breathing Normal    Neuro/Psych: Orientation:Alert and Orientedx3 ; Mood/Affect:normal       A/P:  1. Intertrigo clear  2. Stasis  Compression discussed with patient  Emollient use discussed with patient   Switch to desoximetasone   Return to clinic 4 weeks  It was a pleasure speaking to Tessie Mcdaniels today.  Previous clinic notes and pertinent laboratory tests were reviewed prior to Tessie Mcdaniels's visit.

## 2023-06-02 ENCOUNTER — HEALTH MAINTENANCE LETTER (OUTPATIENT)
Age: 58
End: 2023-06-02

## 2023-06-13 ENCOUNTER — VIRTUAL VISIT (OUTPATIENT)
Dept: DERMATOLOGY | Facility: CLINIC | Age: 58
End: 2023-06-13
Payer: COMMERCIAL

## 2023-06-13 DIAGNOSIS — I87.2 STASIS DERMATITIS OF BOTH LEGS: Primary | ICD-10-CM

## 2023-06-13 DIAGNOSIS — L30.4 INTERTRIGO: ICD-10-CM

## 2023-06-13 PROCEDURE — 99213 OFFICE O/P EST LOW 20 MIN: CPT | Mod: 95 | Performed by: DERMATOLOGY

## 2023-06-13 NOTE — LETTER
"    6/13/2023         RE: Tessie Mcdaniels  [address]  [place] [address]        Dear Colleague,    Thank you for referring your patient, Tessie Mcdaniels, to the [facility]. Please see a copy of my visit note below.        Tessie Mcdaniels is a 58 year old female who is being evaluated via a phone  visit.      The patient has been notified of following:     \"This phone  visit will be conducted via a call between you and your physician/provider. We have found that certain health care needs can be provided without the need for an in-person physical exam.  This service lets us provide the care you need with a video conversation.  If a prescription is necessary we can send it directly to your pharmacy.  If lab work is needed we can place an order for that and you can then stop by our lab to have the test done at a later time.    Phone visits are billed at different rates depending on your insurance coverage.  Please reach out to your insurance provider with any questions.    If during the course of the call the physician/provider feels a phone visit is not appropriate, you will not be charged for this service.\"    Patient has given verbal consent for phone visit? Yes    How would you like to obtain your AVS? MyChart    Tessie Mcdaniels is a 58 year old year old female patient here today for f/u stasis derm and intertrigo.  All clear.  Photos show clear rash.  Patient has no other skin complaints today.  Remainder of the HPI, Meds, PMH, Allergies, FH, and SH was reviewed in chart.      Past Medical History:   Diagnosis Date     PONV (postoperative nausea and vomiting)      Sleep apnea        Past Surgical History:   Procedure Laterality Date     ESOPHAGEAL BALLOON PROVOCATION STUDY N/A 9/14/2021    Procedure: Esophageal Balloon Provocation Study;  Surgeon: Jhon Chavez DO;  Location:  GI     TONSILLECTOMY ADULT Bilateral 11/13/2019    Procedure: Bilateral Tonsillectomy;  Surgeon: " Evita Hampton MD;  Location: [facility]     TURBINECTOMY Bilateral 11/13/2019    Procedure: Turbinectomy;  Surgeon: Evita Hampton MD;  Location:  OR     UVULOPALATOPHARYNGOPLASTY N/A 11/13/2019    Procedure: Uvulopalatopharyngoplasty, bilateral tonsilectomy and bilateral inferior turbinoplasty;  Surgeon: Evita Hampton MD;  Location:  OR        No family history on file.    Social History     Socioeconomic History     Marital status:      Spouse name: Not on file     Number of children: Not on file     Years of education: Not on file     Highest education level: Not on file   Occupational History     Not on file   Tobacco Use     Smoking status: Former     Packs/day: 0.00     Types: Cigarettes     Smokeless tobacco: Never     Tobacco comments:     quit 5 years ago   Vaping Use     Vaping status: Never Used   Substance and Sexual Activity     Alcohol use: Not Currently     Comment: occationally     Drug use: Never     Sexual activity: Not on file   Other Topics Concern     Parent/sibling w/ CABG, MI or angioplasty before 65F 55M? Not Asked   Social History Narrative     Not on file     Social Determinants of Health     Financial Resource Strain: Not on file   Food Insecurity: Not on file   Transportation Needs: Not on file   Physical Activity: Not on file   Stress: Not on file   Social Connections: Not on file   Intimate Partner Violence: Not on file   Housing Stability: Not on file       Outpatient Encounter Medications as of 6/13/2023   Medication Sig Dispense Refill     albuterol (PROAIR HFA/PROVENTIL HFA/VENTOLIN HFA) 108 (90 Base) MCG/ACT inhaler Inhale 2 puffs into the lungs (Patient not taking: Reported on 10/29/2021)       ALPRAZolam (XANAX PO) Take 1 mg by mouth as needed for anxiety  (Patient not taking: Reported on 10/29/2021)       amLODIPine (NORVASC) 5 MG tablet Take 5 mg by mouth daily       atorvastatin (LIPITOR) 40 MG tablet Take 40 mg by mouth every 24 hours        augmented betamethasone dipropionate (DIPROLENE AF) 0.05 % external cream Apply sparingly to affected area twice daily as needed.  Do not apply to face. 200 g 3     desoximetasone (TOPICORT) 0.25 % OINT ointment Apply topically 2 times daily 100 g 6     DULoxetine (CYMBALTA) 60 MG capsule Take 120 mg by mouth       estradiol (ESTRACE) 0.1 MG/GM vaginal cream Place 1 g vaginally (Patient not taking: Reported on 10/29/2021)       fluticasone (FLONASE) 50 MCG/ACT nasal spray Spray 1 spray in nostril       losartan (COZAAR) 100 MG tablet Take 100 mg by mouth daily       predniSONE (DELTASONE) 10 MG tablet Take 4 tabs x 3 days, 3 tabs x 3 days, 2 tabs x 3 days, 1 tab x 3 days. 30 tablet 0     pregabalin (LYRICA) 75 MG capsule Take 75 mg by mouth       semaglutide (OZEMPIC, 0.25 OR 0.5 MG/DOSE,) 2 MG/1.5ML SOPN pen Inject 0.5 mg Subcutaneous       TRAZODONE HCL PO Take 100 mg by mouth At Bedtime        triamcinolone (KENALOG) 0.1 % external cream  (Patient not taking: Reported on 1/23/2023)       triamcinolone (KENALOG) 0.1 % external ointment Apply topically 2 times daily 80 g 1     No facility-administered encounter medications on file as of 6/13/2023.             Review Of Systems  Skin: As above  Eyes: negative  Ears/Nose/Throat: negative  Respiratory: No shortness of breath, dyspnea on exertion, cough, or hemoptysis  Cardiovascular: negative  Gastrointestinal: negative  Genitourinary: negative  Musculoskeletal: negative  Neurologic: negative  Psychiatric: negative  Hematologic/Lymphatic/Immunologic: negative  Endocrine: negative      O:   Alert & Orientedx3, Mood & Affect wnl,    General appearance normal   Alert, oriented and in no acute distress     Photos:clear stasis       Pulm: Breathing Normal, talking in normal sentences, no shortness of breath during conversation    Neuro/Psych: Orientation:Alert and Orientedx3 ; Mood/Affect:normal ; no anxiety or depression     A/P:  1.Stasis dermatitis  To reduce  swelling in the leg:    Don't stand for long periods.     Take regular walks.     Elevate your feet when sitting: if your legs are swollen they need to be above your hips to drain effectively.     Elevate the foot of your bed overnight.     Once the dermatitis is under control, wear special graduated compression stockings long term.    To treat the dermatitis:     Dry up oozing patches with dilute vinegar on gauze as compresses.     Topical steroid prn     Use vanicream daily.    Try not to scratch: it keeps the dermatitis going.     Protect your skin from injury: this can result in infection or ulceration.    Vinegar is 5% acetic acid. Make a 1% solution by adding   cup of vinegar (white or brown) to 1 pint of water.     It was a pleasure speaking to Tessie Mcdaniels today.  Previous clinic  notes and pertinent laboratory tests were reviewed prior to Tessie Mcdaniels's visit.  Return to clinic 6 months    Teledermatology information:  - Location of patient: home  - Location of teledermatologist: [facility]   - Reason teledermatology is appropriate: of National Emergency Regarding Coronavirus disease (COVID 19) Outbreak  - The patient's condition can safely be assessed using telemedicine: yes  - Method of transmission: store and forward teledermatology  - In-person dermatology visit recommendation: no  - Service start time:1200am/pm  - Service end time:1230am/pm  Photos good  - Date of report: 06/13/23        Again, thank you for allowing me to participate in the care of your patient.        Sincerely,        Ash Tolbert MD

## 2023-06-13 NOTE — PROGRESS NOTES
"    Tessie Mcdaniels is a 58 year old female who is being evaluated via a phone  visit.      The patient has been notified of following:     \"This phone  visit will be conducted via a call between you and your physician/provider. We have found that certain health care needs can be provided without the need for an in-person physical exam.  This service lets us provide the care you need with a video conversation.  If a prescription is necessary we can send it directly to your pharmacy.  If lab work is needed we can place an order for that and you can then stop by our lab to have the test done at a later time.    Phone visits are billed at different rates depending on your insurance coverage.  Please reach out to your insurance provider with any questions.    If during the course of the call the physician/provider feels a phone visit is not appropriate, you will not be charged for this service.\"    Patient has given verbal consent for phone visit? Yes    How would you like to obtain your AVS? MyChart    Tessie Mcdaniels is a 58 year old year old female patient here today for f/u stasis derm and intertrigo.  All clear.  Photos show clear rash.  Patient has no other skin complaints today.  Remainder of the HPI, Meds, PMH, Allergies, FH, and SH was reviewed in chart.      Past Medical History:   Diagnosis Date     PONV (postoperative nausea and vomiting)      Sleep apnea        Past Surgical History:   Procedure Laterality Date     ESOPHAGEAL BALLOON PROVOCATION STUDY N/A 9/14/2021    Procedure: Esophageal Balloon Provocation Study;  Surgeon: Jhon Chavez DO;  Location:  GI     TONSILLECTOMY ADULT Bilateral 11/13/2019    Procedure: Bilateral Tonsillectomy;  Surgeon: Evita Hampton MD;  Location: [facility]     TURBINECTOMY Bilateral 11/13/2019    Procedure: Turbinectomy;  Surgeon: Evita Hampton MD;  Location: [facility]     UVULOPALATOPHARYNGOPLASTY N/A 11/13/2019    Procedure: Uvulopalatopharyngoplasty, " bilateral tonsilectomy and bilateral inferior turbinoplasty;  Surgeon: Evita Hampton MD;  Location: [facility]        No family history on file.    Social History     Socioeconomic History     Marital status:      Spouse name: Not on file     Number of children: Not on file     Years of education: Not on file     Highest education level: Not on file   Occupational History     Not on file   Tobacco Use     Smoking status: Former     Packs/day: 0.00     Types: Cigarettes     Smokeless tobacco: Never     Tobacco comments:     quit 5 years ago   Vaping Use     Vaping status: Never Used   Substance and Sexual Activity     Alcohol use: Not Currently     Comment: occationally     Drug use: Never     Sexual activity: Not on file   Other Topics Concern     Parent/sibling w/ CABG, MI or angioplasty before 65F 55M? Not Asked   Social History Narrative     Not on file     Social Determinants of Health     Financial Resource Strain: Not on file   Food Insecurity: Not on file   Transportation Needs: Not on file   Physical Activity: Not on file   Stress: Not on file   Social Connections: Not on file   Intimate Partner Violence: Not on file   Housing Stability: Not on file       Outpatient Encounter Medications as of 6/13/2023   Medication Sig Dispense Refill     albuterol (PROAIR HFA/PROVENTIL HFA/VENTOLIN HFA) 108 (90 Base) MCG/ACT inhaler Inhale 2 puffs into the lungs (Patient not taking: Reported on 10/29/2021)       ALPRAZolam (XANAX PO) Take 1 mg by mouth as needed for anxiety  (Patient not taking: Reported on 10/29/2021)       amLODIPine (NORVASC) 5 MG tablet Take 5 mg by mouth daily       atorvastatin (LIPITOR) 40 MG tablet Take 40 mg by mouth every 24 hours       augmented betamethasone dipropionate (DIPROLENE AF) 0.05 % external cream Apply sparingly to affected area twice daily as needed.  Do not apply to face. 200 g 3     desoximetasone (TOPICORT) 0.25 % OINT ointment Apply topically 2 times daily 100 g 6      DULoxetine (CYMBALTA) 60 MG capsule Take 120 mg by mouth       estradiol (ESTRACE) 0.1 MG/GM vaginal cream Place 1 g vaginally (Patient not taking: Reported on 10/29/2021)       fluticasone (FLONASE) 50 MCG/ACT nasal spray Spray 1 spray in nostril       losartan (COZAAR) 100 MG tablet Take 100 mg by mouth daily       predniSONE (DELTASONE) 10 MG tablet Take 4 tabs x 3 days, 3 tabs x 3 days, 2 tabs x 3 days, 1 tab x 3 days. 30 tablet 0     pregabalin (LYRICA) 75 MG capsule Take 75 mg by mouth       semaglutide (OZEMPIC, 0.25 OR 0.5 MG/DOSE,) 2 MG/1.5ML SOPN pen Inject 0.5 mg Subcutaneous       TRAZODONE HCL PO Take 100 mg by mouth At Bedtime        triamcinolone (KENALOG) 0.1 % external cream  (Patient not taking: Reported on 1/23/2023)       triamcinolone (KENALOG) 0.1 % external ointment Apply topically 2 times daily 80 g 1     No facility-administered encounter medications on file as of 6/13/2023.             Review Of Systems  Skin: As above  Eyes: negative  Ears/Nose/Throat: negative  Respiratory: No shortness of breath, dyspnea on exertion, cough, or hemoptysis  Cardiovascular: negative  Gastrointestinal: negative  Genitourinary: negative  Musculoskeletal: negative  Neurologic: negative  Psychiatric: negative  Hematologic/Lymphatic/Immunologic: negative  Endocrine: negative      O:   Alert & Orientedx3, Mood & Affect wnl,    General appearance normal   Alert, oriented and in no acute distress     Photos:clear stasis       Pulm: Breathing Normal, talking in normal sentences, no shortness of breath during conversation    Neuro/Psych: Orientation:Alert and Orientedx3 ; Mood/Affect:normal ; no anxiety or depression     A/P:  1.Stasis dermatitis  To reduce swelling in the leg:    Don't stand for long periods.     Take regular walks.     Elevate your feet when sitting: if your legs are swollen they need to be above your hips to drain effectively.     Elevate the foot of your bed overnight.     Once the dermatitis  is under control, wear special graduated compression stockings long term.    To treat the dermatitis:     Dry up oozing patches with dilute vinegar on gauze as compresses.     Topical steroid prn     Use vanicream daily.    Try not to scratch: it keeps the dermatitis going.     Protect your skin from injury: this can result in infection or ulceration.    Vinegar is 5% acetic acid. Make a 1% solution by adding   cup of vinegar (white or brown) to 1 pint of water.     It was a pleasure speaking to Tessie Mcdaniels today.  Previous clinic  notes and pertinent laboratory tests were reviewed prior to Tessie Mcdaniels's visit.  Return to clinic 6 months    Teledermatology information:  - Location of patient: home  - Location of teledermatologist: [facility]   - Reason teledermatology is appropriate: of National Emergency Regarding Coronavirus disease (COVID 19) Outbreak  - The patient's condition can safely be assessed using telemedicine: yes  - Method of transmission: store and forward teledermatology  - In-person dermatology visit recommendation: no  - Service start time:1200am/pm  - Service end time:1230am/pm  Photos good  - Date of report: 06/13/23

## 2023-08-08 ENCOUNTER — MYC MEDICAL ADVICE (OUTPATIENT)
Dept: DERMATOLOGY | Facility: CLINIC | Age: 58
End: 2023-08-08
Payer: COMMERCIAL

## 2023-08-08 DIAGNOSIS — I87.2 STASIS DERMATITIS OF BOTH LEGS: Primary | ICD-10-CM

## 2023-08-08 RX ORDER — PREDNISONE 10 MG/1
TABLET ORAL
Qty: 60 TABLET | Refills: 0 | Status: SHIPPED | OUTPATIENT
Start: 2023-08-08 | End: 2024-07-11

## 2023-12-20 ENCOUNTER — OFFICE VISIT (OUTPATIENT)
Dept: DERMATOLOGY | Facility: CLINIC | Age: 58
End: 2023-12-20
Payer: COMMERCIAL

## 2023-12-20 DIAGNOSIS — I87.2 STASIS DERMATITIS OF BOTH LEGS: Primary | ICD-10-CM

## 2023-12-20 DIAGNOSIS — L91.8 SKIN TAG: ICD-10-CM

## 2023-12-20 PROCEDURE — 99213 OFFICE O/P EST LOW 20 MIN: CPT | Mod: 25 | Performed by: DERMATOLOGY

## 2023-12-20 PROCEDURE — 11200 RMVL SKIN TAGS UP TO&INC 15: CPT | Performed by: DERMATOLOGY

## 2023-12-20 NOTE — PROGRESS NOTES
Tessie Mcdaniels is an extremely pleasant 58 year old year old female patient here today for follow up stasis derm all clear.  She notes spots on neck.  Patient has no other skin complaints today.  Remainder of the HPI, Meds, PMH, Allergies, FH, and SH was reviewed in chart.      Past Medical History:   Diagnosis Date    PONV (postoperative nausea and vomiting)     Sleep apnea        Past Surgical History:   Procedure Laterality Date    ESOPHAGEAL BALLOON PROVOCATION STUDY N/A 9/14/2021    Procedure: Esophageal Balloon Provocation Study;  Surgeon: Jhon Chavez DO;  Location: [facility]    TONSILLECTOMY ADULT Bilateral 11/13/2019    Procedure: Bilateral Tonsillectomy;  Surgeon: Evita Hampton MD;  Location: [facility]    TURBINECTOMY Bilateral 11/13/2019    Procedure: Turbinectomy;  Surgeon: vEita Hampton MD;  [facility]    UVULOPALATOPHARYNGOPLASTY N/A 11/13/2019    Procedure: Uvulopalatopharyngoplasty, bilateral tonsilectomy and bilateral inferior turbinoplasty;  Surgeon: Evita Hampton MD;  Location:  OR        History reviewed. No pertinent family history.    Social History     Socioeconomic History    Marital status:      Spouse name: Not on file    Number of children: Not on file    Years of education: Not on file    Highest education level: Not on file   Occupational History    Not on file   Tobacco Use    Smoking status: Former     Packs/day: 0     Types: Cigarettes    Smokeless tobacco: Never    Tobacco comments:     quit 5 years ago   Vaping Use    Vaping Use: Never used   Substance and Sexual Activity    Alcohol use: Not Currently     Comment: occationally    Drug use: Never    Sexual activity: Not on file   Other Topics Concern    Parent/sibling w/ CABG, MI or angioplasty before 65F 55M? Not Asked   Social History Narrative    Not on file     Social Determinants of Health     Financial Resource Strain: Not on file   Food Insecurity: Not on file   Transportation  Needs: Not on file   Physical Activity: Not on file   Stress: Not on file   Social Connections: Not on file   Interpersonal Safety: Not on file   Housing Stability: Not on file       Outpatient Encounter Medications as of 12/20/2023   Medication Sig Dispense Refill    amLODIPine (NORVASC) 5 MG tablet Take 5 mg by mouth daily      atorvastatin (LIPITOR) 40 MG tablet Take 40 mg by mouth every 24 hours      desoximetasone (TOPICORT) 0.25 % OINT ointment Apply topically 2 times daily 100 g 6    DULoxetine (CYMBALTA) 60 MG capsule Take 120 mg by mouth      losartan (COZAAR) 100 MG tablet Take 100 mg by mouth daily      predniSONE (DELTASONE) 10 MG tablet 4 tablets by mouth for 3 days decrease by 1/2 tablet every 3 days 60 tablet 0    TRAZODONE HCL PO Take 100 mg by mouth At Bedtime       albuterol (PROAIR HFA/PROVENTIL HFA/VENTOLIN HFA) 108 (90 Base) MCG/ACT inhaler Inhale 2 puffs into the lungs (Patient not taking: Reported on 10/29/2021)      ALPRAZolam (XANAX PO) Take 1 mg by mouth as needed for anxiety  (Patient not taking: Reported on 10/29/2021)      augmented betamethasone dipropionate (DIPROLENE AF) 0.05 % external cream Apply sparingly to affected area twice daily as needed.  Do not apply to face. 200 g 3    estradiol (ESTRACE) 0.1 MG/GM vaginal cream Place 1 g vaginally (Patient not taking: Reported on 10/29/2021)      fluticasone (FLONASE) 50 MCG/ACT nasal spray Spray 1 spray in nostril      predniSONE (DELTASONE) 10 MG tablet Take 4 tabs x 3 days, 3 tabs x 3 days, 2 tabs x 3 days, 1 tab x 3 days. 30 tablet 0    pregabalin (LYRICA) 75 MG capsule Take 75 mg by mouth      semaglutide (OZEMPIC, 0.25 OR 0.5 MG/DOSE,) 2 MG/1.5ML SOPN pen Inject 0.5 mg Subcutaneous      triamcinolone (KENALOG) 0.1 % external cream  (Patient not taking: Reported on 1/23/2023)      triamcinolone (KENALOG) 0.1 % external ointment Apply topically 2 times daily 80 g 1     No facility-administered encounter medications on file as of  12/20/2023.             O:   NAD, WDWN, Alert & Oriented, Mood & Affect wnl, Vitals stable   General appearance normal   Vitals stable   Alert, oriented and in no acute distress     Legs clear  Tags on neck       Eyes: Conjunctivae/lids:Normal     ENT: Lips, buccal mucosa, tongue: normal    MSK:Normal    Cardiovascular: peripheral edema none    Pulm: Breathing Normal    Neuro/Psych: Orientation:Alert and Orientedx3 ; Mood/Affect:normal       A/P:  Stasis derm  Cont skin care, compression and emollients  2. Skin tags x14  LN2:  Treated with LN2 for 5s for 1-2 cycles. Warned risks of blistering, pain, pigment change, scarring, and incomplete resolution.  Advised patient to return if lesions do not completely resolve.  Wound care sheet given.  It was a pleasure speaking to Tessie Mcdaniels today.  Previous clinic notes and pertinent laboratory tests were reviewed prior to Tessie Mcdaniels's visit.    Nature and genetics of benign skin lesions dicussed with patient.  Skin care regimen reviewed with patient: Eliminate harsh soaps, i.e. Dial, zest, irsih spring; Mild soaps such as Cetaphil or Dove sensitive skin, avoid hot or cold showers, aggressive use of emollients including vanicream, cetaphil or cerave discussed with patient.    Return to clinic 12 months

## 2023-12-20 NOTE — LETTER
12/20/2023         RE: Tessie Mcdaniels  [address]  [place] [address]        Dear Colleague,    Thank you for referring your patient, Tessie Mcdaniels, to the [facility]. Please see a copy of my visit note below.    Tessie Mcdaniels is an extremely pleasant 58 year old year old female patient here today for follow up stasis derm all clear.  She notes spots on neck.  Patient has no other skin complaints today.  Remainder of the HPI, Meds, PMH, Allergies, FH, and SH was reviewed in chart.      Past Medical History:   Diagnosis Date     PONV (postoperative nausea and vomiting)      Sleep apnea        Past Surgical History:   Procedure Laterality Date     ESOPHAGEAL BALLOON PROVOCATION STUDY N/A 9/14/2021    Procedure: Esophageal Balloon Provocation Study;  Surgeon: Jhon Chavez DO;  [facility]     TONSILLECTOMY ADULT Bilateral 11/13/2019    Procedure: Bilateral Tonsillectomy;  Surgeon: Evita Hampton MD;  [facility]     TURBINECTOMY Bilateral 11/13/2019    Procedure: Turbinectomy;  Surgeon: Evita Hampton MD;  [facility]: [facility]     UVULOPALATOPHARYNGOPLASTY N/A 11/13/2019    Procedure: Uvulopalatopharyngoplasty, bilateral tonsilectomy and bilateral inferior turbinoplasty;  Surgeon: Evita Hampton MD;  Location:  OR        History reviewed. No pertinent family history.    Social History     Socioeconomic History     Marital status:      Spouse name: Not on file     Number of children: Not on file     Years of education: Not on file     Highest education level: Not on file   Occupational History     Not on file   Tobacco Use     Smoking status: Former     Packs/day: 0     Types: Cigarettes     Smokeless tobacco: Never     Tobacco comments:     quit 5 years ago   Vaping Use     Vaping Use: Never used   Substance and Sexual Activity     Alcohol use: Not Currently     Comment: occationally     Drug use: Never     Sexual activity:  Not on file   Other Topics Concern     Parent/sibling w/ CABG, MI or angioplasty before 65F 55M? Not Asked   Social History Narrative     Not on file     Social Determinants of Health     Financial Resource Strain: Not on file   Food Insecurity: Not on file   Transportation Needs: Not on file   Physical Activity: Not on file   Stress: Not on file   Social Connections: Not on file   Interpersonal Safety: Not on file   Housing Stability: Not on file       Outpatient Encounter Medications as of 12/20/2023   Medication Sig Dispense Refill     amLODIPine (NORVASC) 5 MG tablet Take 5 mg by mouth daily       atorvastatin (LIPITOR) 40 MG tablet Take 40 mg by mouth every 24 hours       desoximetasone (TOPICORT) 0.25 % OINT ointment Apply topically 2 times daily 100 g 6     DULoxetine (CYMBALTA) 60 MG capsule Take 120 mg by mouth       losartan (COZAAR) 100 MG tablet Take 100 mg by mouth daily       predniSONE (DELTASONE) 10 MG tablet 4 tablets by mouth for 3 days decrease by 1/2 tablet every 3 days 60 tablet 0     TRAZODONE HCL PO Take 100 mg by mouth At Bedtime        albuterol (PROAIR HFA/PROVENTIL HFA/VENTOLIN HFA) 108 (90 Base) MCG/ACT inhaler Inhale 2 puffs into the lungs (Patient not taking: Reported on 10/29/2021)       ALPRAZolam (XANAX PO) Take 1 mg by mouth as needed for anxiety  (Patient not taking: Reported on 10/29/2021)       augmented betamethasone dipropionate (DIPROLENE AF) 0.05 % external cream Apply sparingly to affected area twice daily as needed.  Do not apply to face. 200 g 3     estradiol (ESTRACE) 0.1 MG/GM vaginal cream Place 1 g vaginally (Patient not taking: Reported on 10/29/2021)       fluticasone (FLONASE) 50 MCG/ACT nasal spray Spray 1 spray in nostril       predniSONE (DELTASONE) 10 MG tablet Take 4 tabs x 3 days, 3 tabs x 3 days, 2 tabs x 3 days, 1 tab x 3 days. 30 tablet 0     pregabalin (LYRICA) 75 MG capsule Take 75 mg by mouth       semaglutide (OZEMPIC, 0.25 OR 0.5 MG/DOSE,) 2 MG/1.5ML  SOPN pen Inject 0.5 mg Subcutaneous       triamcinolone (KENALOG) 0.1 % external cream  (Patient not taking: Reported on 1/23/2023)       triamcinolone (KENALOG) 0.1 % external ointment Apply topically 2 times daily 80 g 1     No facility-administered encounter medications on file as of 12/20/2023.             O:   NAD, WDWN, Alert & Oriented, Mood & Affect wnl, Vitals stable   General appearance normal   Vitals stable   Alert, oriented and in no acute distress     Legs clear  Tags on neck       Eyes: Conjunctivae/lids:Normal     ENT: Lips, buccal mucosa, tongue: normal    MSK:Normal    Cardiovascular: peripheral edema none    Pulm: Breathing Normal    Neuro/Psych: Orientation:Alert and Orientedx3 ; Mood/Affect:normal       A/P:  Stasis derm  Cont skin care, compression and emollients  2. Skin tags x14  LN2:  Treated with LN2 for 5s for 1-2 cycles. Warned risks of blistering, pain, pigment change, scarring, and incomplete resolution.  Advised patient to return if lesions do not completely resolve.  Wound care sheet given.  It was a pleasure speaking to Tessie Mcdaniels today.  Previous clinic notes and pertinent laboratory tests were reviewed prior to Tessie Mcdaniels's visit.    Nature and genetics of benign skin lesions dicussed with patient.  Skin care regimen reviewed with patient: Eliminate harsh soaps, i.e. Dial, zest, irsih spring; Mild soaps such as Cetaphil or Dove sensitive skin, avoid hot or cold showers, aggressive use of emollients including vanicream, cetaphil or cerave discussed with patient.    Return to clinic 12 months      Again, thank you for allowing me to participate in the care of your patient.        Sincerely,        Ash Tolbert MD

## 2024-01-31 ENCOUNTER — LAB (OUTPATIENT)
Dept: LAB | Facility: CLINIC | Age: 59
End: 2024-01-31
Payer: COMMERCIAL

## 2024-01-31 ENCOUNTER — OFFICE VISIT (OUTPATIENT)
Dept: SURGERY | Facility: CLINIC | Age: 59
End: 2024-01-31
Payer: COMMERCIAL

## 2024-01-31 VITALS
HEIGHT: 66 IN | SYSTOLIC BLOOD PRESSURE: 120 MMHG | DIASTOLIC BLOOD PRESSURE: 80 MMHG | BODY MASS INDEX: 38.97 KG/M2 | WEIGHT: 242.5 LBS

## 2024-01-31 DIAGNOSIS — E66.01 CLASS 2 SEVERE OBESITY DUE TO EXCESS CALORIES WITH SERIOUS COMORBIDITY IN ADULT, UNSPECIFIED BMI (H): Primary | ICD-10-CM

## 2024-01-31 DIAGNOSIS — E83.10 DISORDER OF IRON METABOLISM, UNSPECIFIED: ICD-10-CM

## 2024-01-31 DIAGNOSIS — R63.39 OTHER FEEDING DIFFICULTIES: ICD-10-CM

## 2024-01-31 DIAGNOSIS — R23.8: ICD-10-CM

## 2024-01-31 DIAGNOSIS — E11.69: ICD-10-CM

## 2024-01-31 DIAGNOSIS — K76.0: ICD-10-CM

## 2024-01-31 DIAGNOSIS — R63.5 ABNORMAL WEIGHT GAIN: ICD-10-CM

## 2024-01-31 DIAGNOSIS — I42.9: ICD-10-CM

## 2024-01-31 DIAGNOSIS — E66.812 CLASS 2 SEVERE OBESITY DUE TO EXCESS CALORIES WITH SERIOUS COMORBIDITY IN ADULT, UNSPECIFIED BMI (H): ICD-10-CM

## 2024-01-31 DIAGNOSIS — E66.01 CLASS 2 SEVERE OBESITY DUE TO EXCESS CALORIES WITH SERIOUS COMORBIDITY IN ADULT, UNSPECIFIED BMI (H): ICD-10-CM

## 2024-01-31 DIAGNOSIS — E88.1: ICD-10-CM

## 2024-01-31 DIAGNOSIS — K76.9 LIVER DISEASE, UNSPECIFIED: ICD-10-CM

## 2024-01-31 DIAGNOSIS — E66.812 CLASS 2 SEVERE OBESITY DUE TO EXCESS CALORIES WITH SERIOUS COMORBIDITY IN ADULT, UNSPECIFIED BMI (H): Primary | ICD-10-CM

## 2024-01-31 PROBLEM — F10.21 HISTORY OF ALCOHOLISM (H): Status: ACTIVE | Noted: 2022-07-25

## 2024-01-31 PROBLEM — L30.9 ECZEMA: Status: ACTIVE | Noted: 2023-02-01

## 2024-01-31 PROBLEM — G89.29 CHRONIC BACK PAIN: Status: ACTIVE | Noted: 2022-07-25

## 2024-01-31 PROBLEM — N95.2 ATROPHIC VAGINITIS: Status: ACTIVE | Noted: 2019-03-13

## 2024-01-31 PROBLEM — T40.2X1A OPIOID OVERDOSE (H): Status: ACTIVE | Noted: 2019-11-17

## 2024-01-31 PROBLEM — R87.610 ASCUS OF CERVIX WITH NEGATIVE HIGH RISK HPV: Status: ACTIVE | Noted: 2020-05-06

## 2024-01-31 PROBLEM — F41.9 ANXIETY: Status: ACTIVE | Noted: 2022-07-25

## 2024-01-31 PROBLEM — H92.03 OTALGIA OF BOTH EARS: Status: ACTIVE | Noted: 2019-11-19

## 2024-01-31 PROBLEM — M54.9 CHRONIC BACK PAIN: Status: ACTIVE | Noted: 2022-07-25

## 2024-01-31 PROBLEM — R91.1 NODULE OF LOWER LOBE OF RIGHT LUNG: Status: ACTIVE | Noted: 2019-03-07

## 2024-01-31 PROBLEM — N17.9 AKI (ACUTE KIDNEY INJURY) (H): Status: ACTIVE | Noted: 2019-11-17

## 2024-01-31 PROBLEM — E78.5 HYPERLIPIDEMIA: Status: ACTIVE | Noted: 2022-07-25

## 2024-01-31 PROBLEM — I95.9 HYPOTENSION: Status: ACTIVE | Noted: 2019-11-17

## 2024-01-31 PROBLEM — F10.20 ALCOHOL USE DISORDER, SEVERE, DEPENDENCE (H): Status: ACTIVE | Noted: 2024-01-10

## 2024-01-31 PROBLEM — Z87.891 HISTORY OF TOBACCO USE: Status: ACTIVE | Noted: 2024-01-31

## 2024-01-31 PROBLEM — E83.51 HYPOCALCEMIA: Status: ACTIVE | Noted: 2019-11-17

## 2024-01-31 PROBLEM — K58.9 IRRITABLE BOWEL SYNDROME: Status: ACTIVE | Noted: 2022-07-25

## 2024-01-31 PROBLEM — R73.03 PRE-DIABETES: Status: ACTIVE | Noted: 2022-07-25

## 2024-01-31 LAB
ERYTHROCYTE [DISTWIDTH] IN BLOOD BY AUTOMATED COUNT: 12.2 % (ref 10–15)
FOLATE SERPL-MCNC: 11.7 NG/ML (ref 4.6–34.8)
HBA1C MFR BLD: 5.3 % (ref 0–5.6)
HCT VFR BLD AUTO: 39.4 % (ref 35–47)
HGB BLD-MCNC: 13.3 G/DL (ref 11.7–15.7)
MCH RBC QN AUTO: 30.2 PG (ref 26.5–33)
MCHC RBC AUTO-ENTMCNC: 33.8 G/DL (ref 31.5–36.5)
MCV RBC AUTO: 90 FL (ref 78–100)
PLATELET # BLD AUTO: 271 10E3/UL (ref 150–450)
RBC # BLD AUTO: 4.4 10E6/UL (ref 3.8–5.2)
WBC # BLD AUTO: 9.6 10E3/UL (ref 4–11)

## 2024-01-31 PROCEDURE — 83690 ASSAY OF LIPASE: CPT

## 2024-01-31 PROCEDURE — 99204 OFFICE O/P NEW MOD 45 MIN: CPT | Performed by: EMERGENCY MEDICINE

## 2024-01-31 PROCEDURE — 83036 HEMOGLOBIN GLYCOSYLATED A1C: CPT

## 2024-01-31 PROCEDURE — 80053 COMPREHEN METABOLIC PANEL: CPT

## 2024-01-31 PROCEDURE — 85027 COMPLETE CBC AUTOMATED: CPT

## 2024-01-31 PROCEDURE — 84590 ASSAY OF VITAMIN A: CPT | Mod: 90

## 2024-01-31 PROCEDURE — 82607 VITAMIN B-12: CPT

## 2024-01-31 PROCEDURE — 83735 ASSAY OF MAGNESIUM: CPT

## 2024-01-31 PROCEDURE — 84443 ASSAY THYROID STIM HORMONE: CPT

## 2024-01-31 PROCEDURE — 36415 COLL VENOUS BLD VENIPUNCTURE: CPT

## 2024-01-31 PROCEDURE — 84630 ASSAY OF ZINC: CPT | Mod: 90

## 2024-01-31 PROCEDURE — 99000 SPECIMEN HANDLING OFFICE-LAB: CPT

## 2024-01-31 PROCEDURE — 83970 ASSAY OF PARATHORMONE: CPT

## 2024-01-31 PROCEDURE — 84425 ASSAY OF VITAMIN B-1: CPT | Mod: 90

## 2024-01-31 PROCEDURE — 82728 ASSAY OF FERRITIN: CPT

## 2024-01-31 PROCEDURE — 82306 VITAMIN D 25 HYDROXY: CPT

## 2024-01-31 PROCEDURE — 83550 IRON BINDING TEST: CPT

## 2024-01-31 PROCEDURE — 83540 ASSAY OF IRON: CPT

## 2024-01-31 PROCEDURE — 82525 ASSAY OF COPPER: CPT | Mod: 90

## 2024-01-31 PROCEDURE — 82746 ASSAY OF FOLIC ACID SERUM: CPT

## 2024-01-31 RX ORDER — DISULFIRAM 250 MG/1
500 TABLET ORAL
COMMUNITY
Start: 2022-03-29

## 2024-01-31 RX ORDER — ROSUVASTATIN CALCIUM 10 MG/1
TABLET, COATED ORAL
COMMUNITY
Start: 2023-09-20

## 2024-01-31 RX ORDER — OMEPRAZOLE 40 MG/1
1 CAPSULE, DELAYED RELEASE ORAL 2 TIMES DAILY
COMMUNITY
Start: 2022-05-11

## 2024-01-31 RX ORDER — ESZOPICLONE 1 MG/1
TABLET, FILM COATED ORAL
COMMUNITY
End: 2024-07-11

## 2024-01-31 RX ORDER — HYDROXYZINE HYDROCHLORIDE 50 MG/1
25-50 TABLET, FILM COATED ORAL
COMMUNITY
Start: 2022-03-29

## 2024-01-31 NOTE — PATIENT INSTRUCTIONS
Plan:  Welcome to the surgical program. We'll start the process but I anticipate some delay in usual time frame due to recently getting alcohol abuse treatment. Stability in use will be required and endorsement in the future from your therapists and psychiatrist as well as clearance from Dr. Edgar, our Bariatric Program psychologist.    2. Continue to avoid nicotine for life and avoid alcohol.     3. Check labs today.    4. Visits with dietician and bariatric psychology.     5. We'll get report from 2019 colonoscopy, unless indicated otherwise, that should be sufficient.    6. Due to GERD/acid reflux hx I'd recommend against a sleeve gastrectomy due to higher risks for worsening disease.    7. Anticipate Actigall after surgery to reduce gallstone risks (6 months of therapy during rapid weight loss phase).    8. Continue CPAP use, bring machine to your surgery.    9. Check in with me in 2-4 months to see how process is going after you've met with Dr. Edgar.           Before being submitted for insurance approval, you will need the following:    -Clearance by the Psychologist  -Clearance by the dietitian  -Attend Support Group ([address] [place] [address] at [facility]) 2nd Tuesday of the month 6:30-8pm. Make sure to sign in.  -Routine Health Care Maintenance must be up to date (mammograms yearly after age 40, paps as recommended by your primary provider,  colonoscopy after age 50, earlier if high risk.  -If you are on estrogen, estrogen will be discontinued one month prior to surgery. It may be resumed one month after surgery unless otherwise advised to limit blood clotting risks.  -Pre Operative Lab work-ordered today.    -Structured weight loss visits IF mandated by your insurance carrier or bariatrician.  -Surgeon consult as we get nearer to potential surgery or sooner if you have special considerations that may make your surgery more complex.  -If you have sleep apnea you will need to be using CPAP for at least one  month before surgery to reduce your risk of breathing problems after surgery. Make sure to bring your CPAP or BiPAP to the hospital at the time of surgery.    -You will need to be tobacco free for 2 months before surgery and remain a non-smoker thereafter. If you are currently smoking or have recently quit, your urine will be evaluated for tobacco metabolites pre-operatively.  Smoking is a major risk factor for developing ulceration of your surgical sites and potential severe complications.    -If you are on insulin, you might be referred to an endocrinologist who will manage your insulin during the liquid diet and around the time of surgery. This endocrinologist does not replace your primary provider or your endocrinologist.   -You will need an exercise plan which includes MOVE, ie., walking and MUSCLE, ie.,calisthenics, bands, weight, machines, etc...Maintenance of long term weight loss and quality of life is much improved with regular exercise as the weight comes off.  ______________________________________________________________________    Remember that after your bariatric surgery, vitamin supplementation is a lifelong need.    You will take:    B-12 300-500mcg or higher sublingual (under the tongue) daily or by 1000mcg injection 1-2X/month  D3:  3000- 5000 IUs daily   Multivitamin containing 18mg of iron twice a day  Calcium citrate 1 or 2 daily    To keep your weight off and your vitamin levels up, follow-up is important.    Your labs will be monitored every 6 months for the first two years (every 3 months if you had a duodenal switch) and yearly thereafter.    To avoid ulcers in your stomach avoid tobacco forever, alcohol in excess, caffeine in excess and anti-inflammatories (NSAIDS)  (Aspirin, Ibuprofen, Naproxen and similar medications). Tylenol is fine at usual doses on the box.    If you are told by your physician take Aspirin to protect your heart or for another reason, make sure to take omeprazole or  similar medication (protonix, nexium, prevacid) to protect your stomach.    Remember that alcohol affects you differently after bariatric surgery. If you have even ONE drink DO NOT DRIVE due to rapid absorption and fast spiking of blood alcohol levels within minutes of consumption.     Slowly Increasing your exercise capacity such that 3-6 months after your surgery you're tolerating 150-250 minutes of exercise weekly will help optimize your metabolism and improve the chance of good weight loss maintenance.          We offer support groups for patients who are working on weight loss and considering, preparing for or have had weight loss surgery.   There is no cost for this opportunity.  You are invited to attend the?Virtual Support Groups?provided by any of the following locations:    [facility] via Microsoft Teams with Ritika Mcbride RN  2.   [place] via [employer] with Gustavo Edgar, PhD, LP  3.   [place] via [employer] with Marla Nice RN  4.   [facility] via Microsoft Teams with Marla Christensen [facility]    The following Support Group information can also be found on our website:  [email or web address]      [facility] Weight Loss Surgery Support Group    [facility] Weight Loss Surgery Support Group  The support group is a patient-lead forum that meets monthly to share experiences, encouragement and education. It is open to those who have had weight loss surgery, are scheduled for surgery, and those who are considering surgery.   WHEN: This group meets on the 3rd Wednesday of each month from 5:00PM - 6:00PM virtually using Microsoft Teams.   FACILITATOR: Led by Ritika Mcbride, the program's Clinical Coordinator.   TO REGISTER: Please contact the clinic via [employer] [place] or call the nurse line directly at [phone] to inform our staff that you would like an invite sent to you and to let us know the email you would like  the invite sent to. Prior to the meeting, a link with directions on how to join the meeting will be sent to you.    [facility] Support Groups    Connections: Bariatric Care Support Group?  This is open to all [facility] (and those external to this program) pre- and post- operative bariatric surgery patients as well as their support system.   WHEN: This group meets the 3rd Tuesday of each month from 6:30 PM - 8:00 PM virtually using Microsoft Teams.   FACILITATOR: Led by Gustavo Edgar, Ph.D who is a Licensed Psychologist with the [facility] Comprehensive Weight Management Program.   TO REGISTER: Please send an email to Gustavo Edgar, Ph.D., LP at [email or web address]?if you would like an invitation to the group and to learn about using Microsoft Teams.      Connections: Post-Operative Bariatric Surgery Support Group  This is a support group for [facility] bariatric patients (and those external to [facility]) who have had bariatric surgery and are at least 3 months post-surgery.  WHEN: This support group meets the 4th Wednesday of the month from 11:00 AM - 12:00 PM virtually using Microsoft Teams.   FACILITATOR: Led by Certified Bariatric Nurse, Marla Nice RN.   TO REGISTER: Please send an email to Marla at [email or web address] if you would like an invitation to the group and to learn about using [employer].      [facility] Healthy Lifestyle Virtual Support Group    Healthy Lifestyle Virtual Support Group?  This is 60 minutes of small group guided discussion, support and resources. All are welcome who want a healthy lifestyle.  WHEN: This group meets monthly on a Friday from 12:30 PM - to 1:30 PM virtually using Microsoft Teams.   FACILITATOR: Led by National Board Certified Health , Marla Christensen Count includes the [facility].   TO REGISTER: Please send an email to Marla [email or web address] to  receive monthly invites to the group or if you have any questions about having a health .  Prior to the meeting, a link with directions on how to join the meeting will be sent to you.      Example Meal Plan for a 8487-1398 Calorie Diet:    In order to fuel your weight loss properly and avoid hunger-induced overeating later in the day, for your height and weight, you will enjoy the most success by following the diet below or similar with adjustments based on your particular tastes and preferences.  Exercise may influence speed, amount of weight loss further.     I recommend getting into a meal routine and keeping it similar day to day in the beginning so you don t have to think too hard about what you re going to make/eat.  Keep snacks healthy, ideally containing protein and some vegetables.  Non-processed food is preferable to packaged items.  Eat at least a few crunchy green vegetables if having a snack, which should be 2-3 hours after your mealtimes(prepare these ahead of time for ease of use).  Drink 64 oz -80 oz of water daily for most, some of you will need more and we'll discuss it at your visit if that is the case.      When changing our diet,  we can often mistake thirst for hunger or just have some distracted eating habits that we need to break free from ('bored/mindless eating', screen time,work, driving,etc).  A glass of water and reconsideration of our hunger is often all that is needed.  Having the urge is not the problem, but watching it pass by without acting on it is the goal.    If you re having hunger problems, add a protein drink/snack to your morning hours or afternoon snack with at least 20grams of protein and not too much sugar (under 10g).  A carton of higher protein/low sugar yogurt can work as well.  If the urge to snack is overwhelming and not satiated, try going for a 10 minute walk/exercise, come home and drink a glass of water and if still hungry, have a  calorie snack  (handful of raw/sprouted nuts, veggies and string cheese, protein bar, etc).  Savor it.    It is better to have a large breakfast, a moderate lunch and a smaller dinner to fuel your day.  People lose 10-15% more weight during their weight loss season with this strategy. Optimizing your protein intake at each meal will further keep you more satisfied while eating less food overall.  Getting exercise in early has also been shown to offer the best results (before breakfast ideally but anytime is the right time to exercise if that is not an option for you).    To make sure you re getting adequate vitamins and minerals during weight loss, I recommend one complete multivitamin a day of your choice.  Consider a probiotic and taking some vitamin D 2000 IU daily.    Let supper be your last meal of the day and ideally try to have at least 12 hours between supper and breakfast the next day to tap into some beneficial overnight fasting dynamics.  Midnight snacks need to go away. Water in the evening is fine, unsweetened, non caffeinated herbal tea is helpful as well.  Consolidating your meals within a 8-12 hour period of your day will help tap into these additional metabolic benefits and tends to keep your appetite up for breakfast, further helping to stay on track.  For most of my patients, I don't recommend an intermittent fasting style diet (many find it hard to fit in their lifestyle) but an overnight fast is very doable for most patients and helps regulate our hunger drives a little better.  This makes it very important to nail good intake at all three meals to feel satisfied/energized and still lose weight.      If evening snacking desires are high, consider a glass of fiber supplement for some additional fullness (metamucil or similar). Most of us don't get the 25-30 grams per day of fiber that promotes good gut health/satiety.  Benefiber, metamucil, citrucel are reasonable/affordable options for most people.  Inulin,  chicory, psyllium husk are reasonable options but start slow and low in the dose to avoid gas/bloating until your gut gets acclimated (ramping up to 5-10 grams per day of supplemental fiber after 3-4 weeks if needed).      Example Meal Plan:  Breakfast: 450-475 Calories  1 egg cooked on low in olive oil:   calories.  5oz Greek Yogurt (Fage plain classic: ~150 daniel)  Handful of Berries of your choice (about a calorie per berry or 20-40cal per handful)    cup(cooked) of  old fashioned oatmeal or 1/2 cup(cooked) steel cut oats. (150 daniel)  Sprinkle amount of brown sugar and a pat of butter. (40 daniel)  Glass of  Water  Black coffee or unsweetened Tea (0calories).      2-3 hours Later Snack: (195 calories).  Glass of water  One string Cheese (80 calories) or 4 oz creamed cottage cheese (115 calories) with  Crunchy Celery sticks (less than 10 calories per large stalk) 2 stalks. (20 calories)    of a  Large Banana or   of a Large Apple (60 calories):  eat second half at lunch or afternoon snack.     Lunch:300 -350 calories   Chicken Breast  (baked/broiled/roasted/grilled)  4-6 oz.  (125-180 daniel), BBQ sauce/hot sauce/mustard/seasoning is free. Just use a reasonable amount. Or a can of tuna with 1 tablespoon mayonnaise.  Salad: lettuce, any other veggies (cucumbers, green peppers/celery you like and a small drizzle of dressing to just flavor.  Go as big on the veggies as you like,  as they are practically calorie free.   A whole, 8 inch cucumber is 45 calories, a whole green pepper is 23 calories, a stalk of celery is 9 calories.  [place] Dressing is 60 calories per tablespoon..so moderate your desired dressing or do a drizzle of olive oil and splash of balsamic vinegar on top,  Total calories unlikely to be over 150 even with dressing.  Glass of Water.    Option for lunch is meal replacement protein drink/smoothie.  Need at least 20 grams of protein and eat the rest of your apple/banana from the morning  snack.      Afternoon Snack: 150-200 calories   Cheese Stick or cottage cheese again  and a fresh fruit OR  Granola Bar (protein Bar acceptable if under 200 calories OR  Homemade smoothies:  8oz skim milk,  a handful of berries (fresh or frozen and a serving of protein powder such as BiPro or Adri sWhey for example.  If you don't like dairy, make with 8oz water, one small banana, handful of berries and the protein powder, add any veggies you want as well:  roughly 200 calories.   Glass of Water    Dinner: 325 calories  4oz of fresh, Atlantic salmon.  Broiled (salt/pepper/dill) for about 8-8.5 minutes (200calories) or  4oz filet mignon steak or sirloin steak  Salad or vegetable sautéed lightly in olive oil or   Broccoli 1.5  cups chopped and steamed  or micro-waved in a little water (75 calories)  Glass of Water,    Cup of herbal tea (unsweetened, caffeine free)      Herbs and seasonings are encouraged to flavor your foods/vegetables.  Make your food delicious.      Tips for Success:  1.  Prepare proteins ahead of time (broil chicken breasts in bulk so you can grab and go), steel cut oats/lentils can be stored in casserole dish/bowl in the fridge for quick scoop in the morning and rewarm in microwave, make use of crock pot recipes (watch salt content).  Making meals that cover 3-4 future meals is an easy way to stay on track.  2.  Drink a 8-12 oz glass of water every 2-3 hours when awake.  We often mistake hunger for thirst, especially when losing weight.  3. Remember your Reward and Motivation when things get hard.  4.  Weigh yourself every morning and record, you'll stay on track better and learn how our biorhythms, diet and elimination patterns show up on the scale. Don't worry about 1 or 2 day patterns, but when on track you'll notice good trend downward of weight over 3-4 day segments.  Plateaus tend to resolve after 4-8 days in most cases if you stay consistent with your plan.  These are natural and part of  "weight loss, even if you're perfect with your plan execution.  5. Call if problems/concerns.  [employer] is a great tool to stay in touch and provide weekly outside accountability. Check in with questions or if you want to brag.  6.  Find a handful of meals/foods that keep you on track and feeling good and get into a routine that is sustainable for you.  It's OK to have a routine that works for you.  7.  Consider taking a complete multivitamin just to make sure all micronutrients are adequate during weight loss.  8. If losing hair/brittle nails it usually means you are not taking enough protein.  Minimum goal is 60 grams daily of protein for smaller women, 80 grams a day for men. Consider taking Biotin as supplement or a \"Hair and Nail\" multivitamin.      LEAN PROTEIN SOURCES  Getting 20-30 grams of protein, 3 meals daily, is appropriate for most people, some need more but more than about 40 grams per meal is not useful.  General rule is drinking one ounce of water per gram of protein eaten over the course of the day:  70 grams of protein each day, drink 70 oz of water.  Protein Source Portion Calories Grams of Protein                           Nonfat, plain Greek yogurt    (10 grams sugar or less) 3/4 cup (6 oz)  12-17   Light Yogurt (10 grams sugar or less) 3/4 cup (6 oz)  6-8   Protein Shake 1 shake 110-180 15-30   Skim/1% Milk or lactose-free milk 1 cup ( 8 oz)  8   Plain or light, flavored soymilk 1 cup  7-8   Plain or light, hemp milk 1 cup 110 6   Fat Free or 1% Cottage Cheese 1/2 cup 90 15   Part skim ricotta cheese 1/2 cup 100 14   Part skim or reduced fat cheese slices 1 ounce 65-80 8     Mozzarella String Cheese 1 80 8   Canned tuna, chicken, crab or salmon  (canned in water)  1/2 cup 100 15-20   White fish (broiled, grilled, baked) 3 ounces 100 21   Tamiment/Tuna (broiled, grilled, baked) 3 ounces 150-180 21   Shrimp, Scallops, Lobster, Crab 3 ounces 100 21   Pork loin, Pork Tenderloin " "3 ounces 150 21   Boneless, skinless chicken /turkey breast                          (broiled, grilled, baked) 3 ounces 120 21   [place], [place], [place], and Venison 3 ounces 120 21   Lean cuts of red meat and pork (sirloin,   round, tenderloin, flank, ground 93%-96%) 3 ounces 170 21   Lean or Extra Lean Ground Turkey 1/2 cup 150 20   90-95% Lean Warren Burger 1 dax 140-180 21   Low-fat casserole with lean meat 3/4 cup 200 17   Luncheon Meats                                                        (turkey, lean ham, roast beef, chicken) 3 ounces 100 21   Egg (boiled, poached, scrambled) 1 Egg 60 7   Egg Substitute 1/2 cup 70 10   Nuts (limit to 1 serving per day)  3 Tbsp. 150 7   Nut Rentz (peanut, almond)  Limit to 1 serving or less daily 1 Tbsp. 90 4   Soy Burger (varies) 1  15   Garbanzo, Black, Arias Beans 1/2 cup 110 7   Refried Beans 1/2 cup 100 7   Kidney and Lima beans 1/2 cup 110 7   Tempeh 3 oz 175 18   Vegan crumbles 1/2 cup 100 14   Tofu 1/2 cup 110 14   Chili (beans and extra lean beef or turkey) 1 cup 200 23   Lentil Stew/Soup 1 cup 150 12   Black Bean Soup 1 cup 175 12       Protein intake goal of 70-75 grams per day, about 20-25 grams per meal every 5-6 hours will help with weight reduction even now.             Bariatric Surgery Seminar Information:     Your health and weight history qualifies for consideration of our surgical weight loss program. We recommend you contact your insurance provider to make sure you don't have a \"plan exclusion\" for bariatric surgery, in which case we'd be unable to pursue this tool for you.  Modern bariatric surgery offers a very strong tool to reduce excess weight and keep it off for the long term, but it is a tool and like any tool it needs to be used the right way for optimal results. To learn more about the surgical tool, as well as additional non surgical tools/options (24 week program, meal replacements, etc), you can view our online seminar in the privacy " of your own home by going to our website and scrolling down to the video section on the web site below:      Website:  [email or web address]      As always if questions arise, don't hesitate to give us a call at [phone].      If you feel you'd like to proceed towards a procedure and have a Body Mass Index (BMI) over 40 or a BMI over 35 if you have type II diabetes, sleep apnea, hypertension requiring medication management or fatty liver disease then book an introductory Bariatric Surgery visit (45minutes) and we'll discuss if may be an appropriate tool for your weight related health needs.      Bariatric Task List    Fax:  Please fax all paperwork to: [phone] -     Status:  Is patient a candidate for bariatric surgery?:  patient is a candidate for bariatric surgery -     Cleared to schedule surgeon consult?:  not cleared to schedule surgeon consult -     Status:  surgery evaluation in process -     Surgeon: Teena or Michael -        Insurance: Insurance:  medica/medicare -      Contact insurance to discuss coverage: Needed -       Other:  Call Radha at [phone] to discuss insurance requirements. -        Patient Info: Initial Weight:  242.5lb -     Date of Initial Weight/Height:  1/31/2024 -     Goal Weight (lbs):  242 -     Required Weight Loss:  NO gain from initial weight, any loss is great. -     Surgery Type:  gastric bypass - GERD and GLobus issues that would make a sleeve a poor choice      Dietician Visits: Structured weight loss required by insurance?:    -     Dietician Visit 1:  Needed -     Dietician Visit 2:  Needed -     Dietician Visit 3:  Needed -     Clearance from dietician to see surgeon?:  Needed - Rosana      Psychological Evaluation: Psych eval:  Needed - Gustavo      Lab Work: Complete Blood Count:  Completed -     Comprehensive Metabolic Panel:  Completed -     Vitamin D:  Completed -     PTH:  Completed -     Hgb A1c:  Completed -  "5.3 on 1/31/2024    TSH (DRE, SCA, [place] MA): Completed -       Ferritin: Completed -       Folate: Completed -     Thiamine: Completed -     Vitamin A: Completed -     Vitamin B12: Completed -     Zinc: Completed -        Consults/ Clearance Sleep Medicine:  Completed - NEERU hx on cpap already. will bring to her surgery.      Testing: EGD:    - in chart.   Sleep Study:   - currently use Cpap      Health Maintenance: Colonoscopy(> 50 yrs or family hx):  Needed - get report from 2019.   Mammogram (> 40 yrs or family hx):  Completed - 8/18/2023-negative.   Pap Smear (women): Completed - normal 2021      Stopping Smoking/ Alcohol Use/Cannabis Use: Quit tobacco use (3 months smoke free)?:  Completed - Quit 2014--never again!   Quit alcohol use: Needed -        Patient Education:  Information Session:  Needed - Must watch videos!   Given \"Making your decision\" handout?:  Yes -     Given \"A Roadmap to you Weight Loss Surgery\" handout?: Yes -     Attended support group?:  Needed - Must attend at least once!!   Support plan in place?:  Completed - Sig other, mother, friends      Additional Surgery Requirements: Review Coag plan:  Completed - standard risks.   Birth control plan:  Completed - postmenopausal   Gallstone prevention plan (Actigall for 6 months postop): Needed -        Final Tasks:  Before surgery online preop class:  Needed - Prior to surgery date.   After surgery online class:  Needed - 1 week after surgery w/dietitian.   History and Physical: Primary Care Provider -   Needed - within 30 days of surgery   Final labs per clinic: Needed - within 30 days of surgery   Chest xray per clinic: Needed - within 90 days of surgery   Electrocardiogram (ECG) per clinic: Needed - within 90 days of surgery   Schedule postop appointments: Needed - Radha to set up when scheduling surgery.             "

## 2024-01-31 NOTE — PROGRESS NOTES
"    New Bariatric Surgery Consultation Note    2024    RE: Tessie Mcdaniels  MR#: [id number]  : 1965      Referring provider:       2024     6:25 PM   --   Who referred you Dr. Marilee Guy       Chief Complaint/Reason for visit: evaluation for possible weight loss surgery    Dear Jammie Ramos,  (General),    I had the pleasure of seeing your patient, Tessie Mcdaniels, to evaluate her obesity and consider her for possible weight loss surgery. As you know, Tessie Mcdaniels is 58 year old.  She has a height of 5' 5.5\", a weight of 242 lbs 8 oz, and calculated Body mass index is 39.74 kg/m ..  Today we discussed our Bariatric Surgery program to address their weight related health. She has has not yet viewed our online seminar prior to this visit and on discussion today, she has decided to pursue the surgical program.  Given the proximity to recent alcohol abuse treatment and still on antabuse, I suspect clearance for future bariatric surgery will be delayed to make sure she's in good control of her alcoholism before undergoing the strain of a bariatric procedure. We can work on non surgical tools depending on her needs and plan to follow up in the spring to review options. On Medicare, GLP1 RA therapies will not be affordable for her.     Factors that could affect the success/risk of their bariatric surgery include:  Alcoholism hx, s/p treatment 2023 reported. Currently on antabuse.    GERD hx would make sleeve gastrectomy a poor option.  Some of her GERD may have been diet related but in light of her findings below, should her chemical dependency be well controlled RNY gastric bypass preferable for resolution of her GERD.     Globus sensation could increase her anxiety postop.    Low activity tolerance.    Regular with her CPAP use.  No evidence for drowsiness today.  .    Assessment & Plan   Problem List Items Addressed This Visit    None     Plan:  Welcome to the surgical " program. We'll start the process but I anticipate some delay in usual time frame due to recently getting alcohol abuse treatment. Stability in treatment will be required and endorsement in the future from your therapists and psychiatrist as well as clearance from Dr. Edgar, our Bariatric Program psychologist.    2. Continue to avoid nicotine for life and avoid alcohol.     3. Check labs today.    4. Visits with dietician and bariatric psychology.     5. We'll get report from 2019 colonoscopy, unless indicated otherwise, that should be sufficient.    6. Due to GERD/acid reflux hx I'd recommend against a sleeve gastrectomy due to higher risks for worsening disease.    7. Anticipate Actigall after surgery to reduce gallstone risks (6 months of therapy during rapid weight loss phase).    8. Continue CPAP use, bring machine to your surgery.    9. Check in with me in 2-4 months to see how process is going after you've met with Dr. Edgar.       HISTORY OF PRESENT ILLNESS:      1/30/2024     6:25 PM   Weight Loss History Reviewed with Patient   How long have you been overweight? Since late 20's to early 40's   What is the most that you have ever weighed 240   What is the most weight you have lost? 120   I have tried the following methods to lose weight Watching portions or calories    Exercise    Weight Watchers    Atkins type diet (low carb/high protein)    Prescription Medications   I have tried the following weight loss medications? (Check all that apply) Naltrexone    Contrave (Naltrexone + Buproion)   Wegovy was on her initial med list but she found it to be poorly tolerated w/ nauseating side effects.   Contrave was substituted by bupropion/naltrexone. Lower efficacy for her.    CO-MORBIDITIES OF OBESITY INCLUDE:      1/30/2024     6:25 PM   --   I have the following health issues associated with obesity Pre-Diabetes    High Blood Pressure    High Cholesterol    Sleep Apnea    Stress Incontinence   CPAP use is  regular.    PAST MEDICAL HISTORY:  Past Medical History:   Diagnosis Date    PONV (postoperative nausea and vomiting)     Sleep apnea        PAST SURGICAL HISTORY:  Past Surgical History:   Procedure Laterality Date    ESOPHAGEAL BALLOON PROVOCATION STUDY N/A 9/14/2021    Procedure: Esophageal Balloon Provocation Study;  Surgeon: Jhon Chavez DO;  Location:  GI    TONSILLECTOMY ADULT Bilateral 11/13/2019    Procedure: Bilateral Tonsillectomy;  Surgeon: Evita Hampton MD;  Location:  OR    TURBINECTOMY Bilateral 11/13/2019    Procedure: Turbinectomy;  Surgeon: Evita Hampton MD;  Location:  OR    UVULOPALATOPHARYNGOPLASTY N/A 11/13/2019    Procedure: Uvulopalatopharyngoplasty, bilateral tonsilectomy and bilateral inferior turbinoplasty;  Surgeon: Evita Hampton MD;  Location:  OR     Was sober 32 years after struggling in her 20s w/ alcoholism. During COVID era started abusing alcohol again. Has psychiatrist, did go to detox program prior to starting antabuse.   FAMILY HISTORY:   No family history on file.    SOCIAL HISTORY:       1/30/2024     6:25 PM   Social History Questions Reviewed With Patient   Which best describes your employment status (select all that apply) I work alternate shifts    I am disabled   If you work, what is your occupation?    Which best describes your marital status in a relationship   Who do you have in your support network that can be available to help you for the first 2 weeks after surgery? several people   Who can you count on for support throughout your weight loss surgery journey? Sig other, mother, friends   Disability stems from back/anxiety issues..     HABITS:      1/30/2024     6:25 PM   --   How often do you drink alcohol? Monthly or less   If you do drink alcohol, how many drinks might you have in a day? (one drink = 5 oz. wine, 1 can/bottle of beer, 1 shot liquor) 7 to 9   Do you currently use any of the  "following Nicotine products? No   Have you ever used any of the following nicotine products? Cigarettes   If you previously used any of these products, what year did you quit? 2014   Have you or are you currently using street drugs or prescription strength medication for which you do not have a prescription for? No   Do you have a history of chemical dependency (alcohol or drug abuse)? Yes   Treatment for chem dep?last month. On antabuse. Will likely require a year of sobriety before being cleared for bariatric surgery.        PSYCHOLOGICAL HISTORY:       1/30/2024     6:25 PM   Psychological History Reviewed With Patient   Have you ever attempted suicide? Never.   Have you had thoughts of suicide in the past year? No   Have you ever been hospitalized for mental illness or a suicide attempt? Never.   Do you have a history of chronic pain? Yes   Have you ever been diagnosed with fibromyalgia? No   Are you currently being treated for any of the following? (select all that apply) Depression    Anxiety   Are you currently seeing a therapist or counselor? Yes   Are you currently seeing a psychiatrist? Yes   Scale of 1-10 right now, she deems her mental health as a 7-8, \"in a good spot\".     ROS:      1/30/2024     6:25 PM   --   Skin Leg swelling   HEENT None of these   Musculoskeletal Joint Pain    Back pain    Swelling of legs    Arthritis   Cardiovascular None of the above   Pulmonary Snoring    Awaken from sleep to catch your breath    People have told me I stop breathing while asleep   Gastrointestinal Reflux   Genitourinary Stress incontinence (losing urine when coughing, sneezing, etc.)   Hematological None of the above   Neurological Migraine headaches   Female only Post-menopausal   GERD sx are treated with 40mg PPI. Frequency of symptoms is controlled reasonably right now. GERD on Bravo study. Review of Dr. Chavez's notes from 10/29/21 visit showed:  STUDIES & PROCEDURES:     EGD:   Date: " 9/14/21  Impression:  The examined esophagus was normal. With the patient in the left lateral        decubitus position, a 16 cm long 3 mm diameter functional lumen imaging        probe (FLIP), with a volume-based barostat bag, was placed at the lower        esophageal sphincter without endoscopic visualization for the globus.        Saline was infused into the bag to a volume of 30 mL. Additional saline        was infused to a volume of 40 mL, Additional saline was infused to a        volume of 50 mL, Additional saline was infused to a volume of 60 mL and        Additional saline was infused to a volume of 70 mL. FLIP Topography was        performed using stepwise distensions and demonstrated repetitive        antegrade contractions (RAC). The catheter was deflated and withdrawn.        The BRAVO capsule with delivery system was introduced through the mouth        and advanced into the esophagus, such that the BRAVO pH capsule was        positioned 31 cm from the incisors, which was 6 cm proximal to the GE        junction. Suction was applied to the well of the BRAVO pH capsule to        suck in the adjacent mucosa of the esophagus using the external vacuum        pump for 30 seconds. The BRAVO pH capsule was then deployed by        depressing the plunger on top of the handle to advance the locking pin        into the mucosa, thereby attaching the capsule to the esophagus. The        plunger was then rotated a quarter turn clockwise to release the capsule        from the delivery system. The delivery system was then withdrawn.        Endoscopy was utilized for probe placement and diagnostic evaluation.        This was the second Bravo deployed. The first attempt did not actually        deploy the Bravo capsule. The entire delivery system and capsule was        removed prior to insertion and successful deployment.        - See op note in Epic for fill volume data.        The entire examined stomach was normal.         The examined duodenum was normal.                                                                                     Impression:          - Normal esophagus.                        - Normal stomach.                        - Normal examined duodenum.                        - FLIP imaging performed, diagnostic of normal                        esophageal contractility.                        - The BRAVO pH capsule was positioned 31 cm from the                        incisors, which was 6 cm proximal to the GE junction.                        - No specimens collected.   Pathology Report:     Colonoscopy: 03/13/2014   Date:  Impression:  Pathology Report:                EndoFLIP directed at the LES  16cm (EF-322) balloon length):   Date: 9/14/21     16cm balloon  Balloon inflation Balloon pressure CSA (mm^2) DI (mm^2/mmHg) Dmin (mm) Compliance   30 (ladmark ID) [id number]   10.95 22     40 50   10.03 25.3     50 25.3   6.87 19.9     60 62.1   6.01 21.8     70 75   5.37 22.7                    Normal RACs     High Resolution Manometry:  Date:  Impression:     PH/Impedance:  Date:  Impression:                Bravo:  96hr  Date: 9/14/21  Impression:    CT:  Date:  Impression:     Esophagram:  Date: 04/15/2021                                                                Impression:  1. No penetration or aspiration. Please see the speech pathologist  report for further details.  2. Gastroesophageal reflux.                Prior medical records were reviewed including, but not limited to, notes from referring providers, lab work, radiographic tests, and other diagnostic tests. Pertinent results were summarized above.   EATING BEHAVIORS:      1/30/2024     6:25 PM   --   Have you or anyone else thought that you had an eating disorder? Yes   If you answered yes to the previous eating disorder question, select the types that apply from this list Binge Eating   Do you currently binge eat (eat a large amount of food in a short time)?  Yes   Are you an emotional eater? Yes   Do you get up to eat after falling asleep? No   Can you afford 3 meals a day? Yes   Can you afford 50-60 dollars a month for vitamins? Yes   Any previous treatment for binge eating disorder or working in CBT type tools in the past w/ her therapist? Yes.     EXERCISE:      1/30/2024     6:25 PM   --   How often do you exercise? 1 to 2 times per week   What is the duration of your exercise (in minutes)? 20 Minutes   What types of exercise do you do? walking    swimming    climbing stairs at work   What keeps you from being more active? Pain    I should be more active but I just have not gotten around to it    Worried people will look at me       MEDICATIONS:  Current Outpatient Medications   Medication Sig Dispense Refill    albuterol (PROAIR HFA/PROVENTIL HFA/VENTOLIN HFA) 108 (90 Base) MCG/ACT inhaler Inhale 2 puffs into the lungs      ALPRAZolam (XANAX PO) Take 1 mg by mouth as needed for anxiety      amLODIPine (NORVASC) 5 MG tablet Take 5 mg by mouth daily      atorvastatin (LIPITOR) 40 MG tablet Take 40 mg by mouth every 24 hours      desoximetasone (TOPICORT) 0.25 % OINT ointment Apply topically 2 times daily 100 g 6    disulfiram (ANTABUSE) 250 MG tablet Take 500 mg by mouth      DULoxetine (CYMBALTA) 60 MG capsule Take 120 mg by mouth      eszopiclone (LUNESTA) 1 MG tablet eszopiclone 1 mg tablet      hydrOXYzine HCl (ATARAX) 50 MG tablet Take 25-50 mg by mouth      losartan (COZAAR) 100 MG tablet Take 100 mg by mouth daily      omeprazole (PRILOSEC) 40 MG DR capsule Take 1 capsule by mouth 2 times daily      predniSONE (DELTASONE) 10 MG tablet 4 tablets by mouth for 3 days decrease by 1/2 tablet every 3 days 60 tablet 0    rosuvastatin (CRESTOR) 10 MG tablet TAKE 1 TABLET BY MOUTH ONCE DAILY WITH A MEAL      TRAZODONE HCL PO Take 100 mg by mouth At Bedtime       triamcinolone (KENALOG) 0.1 % external cream       augmented betamethasone dipropionate (DIPROLENE AF) 0.05  "% external cream Apply sparingly to affected area twice daily as needed.  Do not apply to face. 200 g 3    estradiol (ESTRACE) 0.1 MG/GM vaginal cream Place 1 g vaginally (Patient not taking: Reported on 10/29/2021)      fluticasone (FLONASE) 50 MCG/ACT nasal spray Spray 1 spray in nostril      predniSONE (DELTASONE) 10 MG tablet Take 4 tabs x 3 days, 3 tabs x 3 days, 2 tabs x 3 days, 1 tab x 3 days. 30 tablet 0    pregabalin (LYRICA) 75 MG capsule Take 75 mg by mouth      semaglutide (OZEMPIC, 0.25 OR 0.5 MG/DOSE,) 2 MG/1.5ML SOPN pen Inject 0.5 mg Subcutaneous      triamcinolone (KENALOG) 0.1 % external ointment Apply topically 2 times daily 80 g 1       ALLERGIES:  Allergies   Allergen Reactions    Diflucan [Fluconazole]      Mouth sores    Sulfa Antibiotics Nausea and Vomiting, Nausea and GI Disturbance   No results found for: \"A1C\"  No results found for: \"TSH\"  Chart review shows 12/12/23 LFTs w/ ALT of 112 and AST of 94, normal bilirubin and alk phos levels. 9/20/23 Glucose of 171mg/dL with borderline diabetic A1c of 6.3% unchanged from 2/17/23 level of 6.3% in [facility] system.   Elevated triglycerides of 212 mg/dL. 9/20/23, HDL 64, LDL 88, noted to be fasting.10/04/23 US Elastography c/w Fatty liver disease:  EXAM: US ELASTOGRAPHY WITH US ABDOMEN LIMITED   LOCATION: [facility]   DATE: 10/4/2023     INDICATION: Elevated Liver Enzymes   COMPARISON: CT abdomen pelvis dated 05/14/2023.     FINDINGS:   GALLBLADDER: Normal. No gallstones, wall thickening, or pericholecystic fluid. Negative sonographic Quevedo's sign.     BILE DUCTS: No biliary dilatation. The common duct measures or 0.5.     LIVER: Increased echogenicity from diffuse fatty infiltration. No focal mass. The portal vein is patent with flow in the normal direction.     ELASTOGRAPHY: 1.50 m/s (6.77 kPa units) equals Metavir score F0 - F1.     SPLEEN: 10.5 x 12.1 x 3.8 cm with normal sonographic appearance. No ascites.     Hx of " multiple but stable pulmonary nodules:  EXAM: CT CHEST WO   LOCATION: [facility]   DATE/TIME: 2023 10:05 AM CDT     INDICATION: Multiple Lung Nodules   Multiple lung nodules   COMPARISON: 2022.   TECHNIQUE: CT chest without IV contrast. Multiplanar reformats were obtained. Dose reduction techniques were used.   CONTRAST: None.     FINDINGS:   LUNGS AND PLEURA: Multiple nodules includin mm subsolid right lower lobe image 195 of series 7 is stable.   7 mm subsolid inferior lingula image 200, stable.   3 mm pleural-based left upper lobe image 121. Stable   3 mm triangular along the left major fissure image 132 consistent with an intrapulmonary lymph node, stable   5 mm left lower lobe image 179. Stable   4 mm left lower lobe subpleural image 174 previously 5 mm.   4 mm subpleural inferior left lower lobe image 231, stable   4 mm triangular along the right minor fissure right middle lobe image 161 new from the prior study but consistent with benign intrapulmonary lymph node.   2 mm right upper lobe along an accessory fissure image 163 3 mm on the prior study likely representing a intrapulmonary lymph node.   No suspicious or enlarging nodules or masses head   no consolidation, pneumothorax or pleural effusion.     MEDIASTINUM/AXILLAE: No lymphadenopathy. Normal heart size. No pericardial effusion. No thoracic aortic aneurysm.     CORONARY ARTERY CALCIFICATION: None.     UPPER ABDOMEN: Diffuse hepatic steatosis without focal hepatic lesion.     MUSCULOSKELETAL: Schmorl's node superior endplate of T12 no suspicious bony lesions or acute fractures.     ECHO 3/15/23 w/ good LV function:  CARDIAC ROUTINE ECHOCARDIOGRAM    Narrative  Performed by PROSOLV  Summary    1. Normal LV and RV size and systolic function.   LVEF 70%.    2. Mild TR.    Trace MR.    3. No prior studies for comparison.      Report Signatures  Amended by Teresa Chase on 3/16/2023 10:32 AM  Finalized by Ang   "Terrileander on 3/15/2023 12:11 PM    9/15/17 PSG with SEVERE NEERU: AHI of 70, RDI of 81 w/ hypoxia reaching 85% SpO2.     BMI of 1738ckcal/day, Ffm of 117 lbs. Body fat 51.5%.   PHYSICAL EXAM:  Objective    /80   Ht 1.664 m (5' 5.5\")   Wt 110 kg (242 lb 8 oz)   BMI 39.74 kg/m    /80   Ht 1.664 m (5' 5.5\")   Wt 110 kg (242 lb 8 oz)   BMI 39.74 kg/m    Body mass index is 39.74 kg/m .  Physical Exam   GENERAL: alert and no distress  NECK: no adenopathy, no asymmetry, masses, or scars  RESP: lungs clear to auscultation - no rales, rhonchi or wheezes  CV: regular rate and rhythm, normal S1 S2, no S3 or S4, no murmur, click or rub, no peripheral edema  ABDOMEN: enlarged liver margin, nontender, no ascites evident. Central adiposity. Scratch w/ bandaid lower pannus. No cellulitis.  MS: no gross musculoskeletal defects noted, no edema. Mid shin compression socks.      In summary, Tessie Mcdaniels has Class II obesity with a body mass index of Body mass index is 39.74 kg/m . kg/m2 and the comorbidities stated above. She completed an informational seminar and is a possible candidate for the laparoscopic gastric bypass.  She will have to complete the following pre-requisites:      Today in the office we discussed gastric bypass surgery.  Preoperative, perioperative, and postoperative processes, management, and follow up were addressed.  Risks and benefits were outlined including the risk of death, staple line leak (1-2%), PE, DVT, ulcer, N/V, stricture, hernia, wound infection, weight regain, and vitamin deficiencies. I emphasized exercise and activity along with appropriate food choice as the main foundation for weight loss with surgery providing surgical reinforcement of this. All questions were answered.  A goal sheet and support group handout were given to the patient. . We've covered the basics of bariatric method required for the optimization of their weight related health.  We've discussed how surgery will " affect their future nutritional needs; including the general nutritional approach, vitamin supplementation and which chemical/medication irritants we wish to avoid following surgery to reduce the risk of complications. We expect to be an integral part of their on going health care following surgery and long term follow up is the expectation should it relate to their nutritional and supplement needs or bariatric anatomy. Once two years out from surgery, annual visits are the current recommendation to help maintenance of optimal weight related/bariatric health.      Once the patient has completed the requirements in their task list and there are no further recommendations, the pt will be allowed to see the surgeon of her choice for consultation on the laparoscopic gastric bypass surgery. Patient verbalizes understanding of the process to surgery and expectations for the postoperative period including the need for lifelong lifestyle changes, vitamin supplementation, and laboratory monitoring. For patients capable of child bearing, we discussed taking precautions to avoid pregnancy for at least the first 15-18 months following their procedure.  We do not recommend high progesterone options given the effects on appetite/weight and encouraged follow up with the patient's primary care provider for birth control options.  Sincerely,     Roldan Mills MD  1/31/2024  7:32 AM

## 2024-01-31 NOTE — LETTER
"    2024         RE: Tessie Mcdaniels  [address]  [place] [address]        Dear Colleague,    Thank you for referring your patient, Tessie Mcdaniels, to the [facility]. Please see a copy of my visit note below.        New Bariatric Surgery Consultation Note    2024    RE: Tessie Mcdaniels  MR#: [id number]  : 1965      Referring provider:       2024     6:25 PM   --   Who referred you Dr. Marilee Guy       Chief Complaint/Reason for visit: evaluation for possible weight loss surgery    Dear Jammie Ramos DO (General),    I had the pleasure of seeing your patient, Tessie Mcdaniels, to evaluate her obesity and consider her for possible weight loss surgery. As you know, Tessie Mcdaniels is 58 year old.  She has a height of 5' 5.5\", a weight of 242 lbs 8 oz, and calculated Body mass index is 39.74 kg/m ..  Today we discussed our Bariatric Surgery program to address their weight related health. She has has not yet viewed our online seminar prior to this visit and on discussion today, she has decided to pursue the surgical program.  Given the proximity to recent alcohol abuse treatment and still on antabuse, I suspect clearance for future bariatric surgery will be delayed to make sure she's in good control of her alcoholism before undergoing the strain of a bariatric procedure. We can work on non surgical tools depending on her needs and plan to follow up in the spring to review options. On Medicare, GLP1 RA therapies will not be affordable for her.     Factors that could affect the success/risk of their bariatric surgery include:  Alcoholism hx, s/p treatment 2023 reported. Currently on antabuse.    GERD hx would make sleeve gastrectomy a poor option.  Some of her GERD may have been diet related but in light of her findings below, should her chemical dependency be well controlled RNY gastric bypass " preferable for resolution of her GERD.     Globus sensation could increase her anxiety postop.    Low activity tolerance.    Regular with her CPAP use.  No evidence for drowsiness today.  .    Assessment & Plan  Problem List Items Addressed This Visit    None     Plan:  Welcome to the surgical program. We'll start the process but I anticipate some delay in usual time frame due to recently getting alcohol abuse treatment. Stability in treatment will be required and endorsement in the future from your therapists and psychiatrist as well as clearance from Dr. Edgar, our Bariatric Program psychologist.    2. Continue to avoid nicotine for life and avoid alcohol.     3. Check labs today.    4. Visits with dietician and bariatric psychology.     5. We'll get report from 2019 colonoscopy, unless indicated otherwise, that should be sufficient.    6. Due to GERD/acid reflux hx I'd recommend against a sleeve gastrectomy due to higher risks for worsening disease.    7. Anticipate Actigall after surgery to reduce gallstone risks (6 months of therapy during rapid weight loss phase).    8. Continue CPAP use, bring machine to your surgery.    9. Check in with me in 2-4 months to see how process is going after you've met with Dr. Edgar.       HISTORY OF PRESENT ILLNESS:      1/30/2024     6:25 PM   Weight Loss History Reviewed with Patient   How long have you been overweight? Since late 20's to early 40's   What is the most that you have ever weighed 240   What is the most weight you have lost? 120   I have tried the following methods to lose weight Watching portions or calories    Exercise    Weight Watchers    Atkins type diet (low carb/high protein)    Prescription Medications   I have tried the following weight loss medications? (Check all that apply) Naltrexone    Contrave (Naltrexone + Buproion)   Wegovy was on her initial med list but she found it to be poorly tolerated w/ nauseating side effects.   Contrave was  substituted by bupropion/naltrexone. Lower efficacy for her.    CO-MORBIDITIES OF OBESITY INCLUDE:      1/30/2024     6:25 PM   --   I have the following health issues associated with obesity Pre-Diabetes    High Blood Pressure    High Cholesterol    Sleep Apnea    Stress Incontinence   CPAP use is regular.    PAST MEDICAL HISTORY:  Past Medical History:   Diagnosis Date     PONV (postoperative nausea and vomiting)      Sleep apnea        PAST SURGICAL HISTORY:  Past Surgical History:   Procedure Laterality Date     ESOPHAGEAL BALLOON PROVOCATION STUDY N/A 9/14/2021    Procedure: Esophageal Balloon Provocation Study;  Surgeon: Jhon Chavez DO;  Location:  GI     TONSILLECTOMY ADULT Bilateral 11/13/2019    Procedure: Bilateral Tonsillectomy;  Surgeon: Evita Hampton MD;  Location:  OR     TURBINECTOMY Bilateral 11/13/2019    Procedure: Turbinectomy;  Surgeon: Evita Hampton MD;  Location:  OR     UVULOPALATOPHARYNGOPLASTY N/A 11/13/2019    Procedure: Uvulopalatopharyngoplasty, bilateral tonsilectomy and bilateral inferior turbinoplasty;  Surgeon: Evita Hampton MD;  Location:  OR     Was sober 32 years after struggling in her 20s w/ alcoholism. During COVID era started abusing alcohol again. Has psychiatrist, did go to detox program prior to starting antabuse.   FAMILY HISTORY:   No family history on file.    SOCIAL HISTORY:       1/30/2024     6:25 PM   Social History Questions Reviewed With Patient   Which best describes your employment status (select all that apply) I work alternate shifts    I am disabled   If you work, what is your occupation?    Which best describes your marital status in a relationship   Who do you have in your support network that can be available to help you for the first 2 weeks after surgery? several people   Who can you count on for support throughout your weight loss surgery journey? Sig other, mother, friends  "  Disability stems from back/anxiety issues..     HABITS:      1/30/2024     6:25 PM   --   How often do you drink alcohol? Monthly or less   If you do drink alcohol, how many drinks might you have in a day? (one drink = 5 oz. wine, 1 can/bottle of beer, 1 shot liquor) 7 to 9   Do you currently use any of the following Nicotine products? No   Have you ever used any of the following nicotine products? Cigarettes   If you previously used any of these products, what year did you quit? 2014   Have you or are you currently using street drugs or prescription strength medication for which you do not have a prescription for? No   Do you have a history of chemical dependency (alcohol or drug abuse)? Yes   Treatment for chem dep?last month. On antabuse. Will likely require a year of sobriety before being cleared for bariatric surgery.        PSYCHOLOGICAL HISTORY:       1/30/2024     6:25 PM   Psychological History Reviewed With Patient   Have you ever attempted suicide? Never.   Have you had thoughts of suicide in the past year? No   Have you ever been hospitalized for mental illness or a suicide attempt? Never.   Do you have a history of chronic pain? Yes   Have you ever been diagnosed with fibromyalgia? No   Are you currently being treated for any of the following? (select all that apply) Depression    Anxiety   Are you currently seeing a therapist or counselor? Yes   Are you currently seeing a psychiatrist? Yes   Scale of 1-10 right now, she deems her mental health as a 7-8, \"in a good spot\".     ROS:      1/30/2024     6:25 PM   --   Skin Leg swelling   HEENT None of these   Musculoskeletal Joint Pain    Back pain    Swelling of legs    Arthritis   Cardiovascular None of the above   Pulmonary Snoring    Awaken from sleep to catch your breath    People have told me I stop breathing while asleep   Gastrointestinal Reflux   Genitourinary Stress incontinence (losing urine when coughing, sneezing, etc.)   Hematological None " of the above   Neurological Migraine headaches   Female only Post-menopausal   GERD sx are treated with 40mg PPI. Frequency of symptoms is controlled reasonably right now. GERD on Bravo study. Review of Dr. Chavez's notes from 10/29/21 visit showed:  STUDIES & PROCEDURES:     EGD:   Date: 9/14/21  Impression:  The examined esophagus was normal. With the patient in the left lateral        decubitus position, a 16 cm long 3 mm diameter functional lumen imaging        probe (FLIP), with a volume-based barostat bag, was placed at the lower        esophageal sphincter without endoscopic visualization for the globus.        Saline was infused into the bag to a volume of 30 mL. Additional saline        was infused to a volume of 40 mL, Additional saline was infused to a        volume of 50 mL, Additional saline was infused to a volume of 60 mL and        Additional saline was infused to a volume of 70 mL. FLIP Topography was        performed using stepwise distensions and demonstrated repetitive        antegrade contractions (RAC). The catheter was deflated and withdrawn.        The BRAVO capsule with delivery system was introduced through the mouth        and advanced into the esophagus, such that the BRAVO pH capsule was        positioned 31 cm from the incisors, which was 6 cm proximal to the GE        junction. Suction was applied to the well of the BRAVO pH capsule to        suck in the adjacent mucosa of the esophagus using the external vacuum        pump for 30 seconds. The BRAVO pH capsule was then deployed by        depressing the plunger on top of the handle to advance the locking pin        into the mucosa, thereby attaching the capsule to the esophagus. The        plunger was then rotated a quarter turn clockwise to release the capsule        from the delivery system. The delivery system was then withdrawn.        Endoscopy was utilized for probe placement and diagnostic evaluation.        This was the second  Bravo deployed. The first attempt did not actually        deploy the Bravo capsule. The entire delivery system and capsule was        removed prior to insertion and successful deployment.        - See op note in Epic for fill volume data.        The entire examined stomach was normal.        The examined duodenum was normal.                                                                                     Impression:          - Normal esophagus.                        - Normal stomach.                        - Normal examined duodenum.                        - FLIP imaging performed, diagnostic of normal                        esophageal contractility.                        - The BRAVO pH capsule was positioned 31 cm from the                        incisors, which was 6 cm proximal to the GE junction.                        - No specimens collected.   Pathology Report:     Colonoscopy: 03/13/2014   Date:  Impression:  Pathology Report:                EndoFLIP directed at the LES  16cm (EF-322) balloon length):   Date: 9/14/21     16cm balloon  Balloon inflation Balloon pressure CSA (mm^2) DI (mm^2/mmHg) Dmin (mm) Compliance   30 (ladmark ID) [id number]   10.95 22     40 50   10.03 25.3     50 25.3   6.87 19.9     60 62.1   6.01 21.8     70 75   5.37 22.7                    Normal RACs     High Resolution Manometry:  Date:  Impression:     PH/Impedance:  Date:  Impression:                Bravo:  96hr  Date: 9/14/21  Impression:    CT:  Date:  Impression:     Esophagram:  Date: 04/15/2021                                                                Impression:  1. No penetration or aspiration. Please see the speech pathologist  report for further details.  2. Gastroesophageal reflux.                Prior medical records were reviewed including, but not limited to, notes from referring providers, lab work, radiographic tests, and other diagnostic tests. Pertinent results were summarized above.   EATING BEHAVIORS:       1/30/2024     6:25 PM   --   Have you or anyone else thought that you had an eating disorder? Yes   If you answered yes to the previous eating disorder question, select the types that apply from this list Binge Eating   Do you currently binge eat (eat a large amount of food in a short time)? Yes   Are you an emotional eater? Yes   Do you get up to eat after falling asleep? No   Can you afford 3 meals a day? Yes   Can you afford 50-60 dollars a month for vitamins? Yes   Any previous treatment for binge eating disorder or working in CBT type tools in the past w/ her therapist? Yes.     EXERCISE:      1/30/2024     6:25 PM   --   How often do you exercise? 1 to 2 times per week   What is the duration of your exercise (in minutes)? 20 Minutes   What types of exercise do you do? walking    swimming    climbing stairs at work   What keeps you from being more active? Pain    I should be more active but I just have not gotten around to it    Worried people will look at me       MEDICATIONS:  Current Outpatient Medications   Medication Sig Dispense Refill     albuterol (PROAIR HFA/PROVENTIL HFA/VENTOLIN HFA) 108 (90 Base) MCG/ACT inhaler Inhale 2 puffs into the lungs       ALPRAZolam (XANAX PO) Take 1 mg by mouth as needed for anxiety       amLODIPine (NORVASC) 5 MG tablet Take 5 mg by mouth daily       atorvastatin (LIPITOR) 40 MG tablet Take 40 mg by mouth every 24 hours       desoximetasone (TOPICORT) 0.25 % OINT ointment Apply topically 2 times daily 100 g 6     disulfiram (ANTABUSE) 250 MG tablet Take 500 mg by mouth       DULoxetine (CYMBALTA) 60 MG capsule Take 120 mg by mouth       eszopiclone (LUNESTA) 1 MG tablet eszopiclone 1 mg tablet       hydrOXYzine HCl (ATARAX) 50 MG tablet Take 25-50 mg by mouth       losartan (COZAAR) 100 MG tablet Take 100 mg by mouth daily       omeprazole (PRILOSEC) 40 MG DR capsule Take 1 capsule by mouth 2 times daily       predniSONE (DELTASONE) 10 MG tablet 4 tablets by mouth for 3  "days decrease by 1/2 tablet every 3 days 60 tablet 0     rosuvastatin (CRESTOR) 10 MG tablet TAKE 1 TABLET BY MOUTH ONCE DAILY WITH A MEAL       TRAZODONE HCL PO Take 100 mg by mouth At Bedtime        triamcinolone (KENALOG) 0.1 % external cream        augmented betamethasone dipropionate (DIPROLENE AF) 0.05 % external cream Apply sparingly to affected area twice daily as needed.  Do not apply to face. 200 g 3     estradiol (ESTRACE) 0.1 MG/GM vaginal cream Place 1 g vaginally (Patient not taking: Reported on 10/29/2021)       fluticasone (FLONASE) 50 MCG/ACT nasal spray Spray 1 spray in nostril       predniSONE (DELTASONE) 10 MG tablet Take 4 tabs x 3 days, 3 tabs x 3 days, 2 tabs x 3 days, 1 tab x 3 days. 30 tablet 0     pregabalin (LYRICA) 75 MG capsule Take 75 mg by mouth       semaglutide (OZEMPIC, 0.25 OR 0.5 MG/DOSE,) 2 MG/1.5ML SOPN pen Inject 0.5 mg Subcutaneous       triamcinolone (KENALOG) 0.1 % external ointment Apply topically 2 times daily 80 g 1       ALLERGIES:  Allergies   Allergen Reactions     Diflucan [Fluconazole]      Mouth sores     Sulfa Antibiotics Nausea and Vomiting, Nausea and GI Disturbance   No results found for: \"A1C\"  No results found for: \"TSH\"  Chart review shows 12/12/23 LFTs w/ ALT of 112 and AST of 94, normal bilirubin and alk phos levels. 9/20/23 Glucose of 171mg/dL with borderline diabetic A1c of 6.3% unchanged from 2/17/23 level of 6.3% in [facility] system.   Elevated triglycerides of 212 mg/dL. 9/20/23, HDL 64, LDL 88, noted to be fasting.10/04/23 US Elastography c/w Fatty liver disease:  EXAM: US ELASTOGRAPHY WITH US ABDOMEN LIMITED   LOCATION: [facility]   DATE: 10/4/2023     INDICATION: Elevated Liver Enzymes   COMPARISON: CT abdomen pelvis dated 05/14/2023.     FINDINGS:   GALLBLADDER: Normal. No gallstones, wall thickening, or pericholecystic fluid. Negative sonographic Quevedo's sign.     BILE DUCTS: No biliary dilatation. The common duct measures or " 0.5.     LIVER: Increased echogenicity from diffuse fatty infiltration. No focal mass. The portal vein is patent with flow in the normal direction.     ELASTOGRAPHY: 1.50 m/s (6.77 kPa units) equals Metavir score F0 - F1.     SPLEEN: 10.5 x 12.1 x 3.8 cm with normal sonographic appearance. No ascites.     Hx of multiple but stable pulmonary nodules:  EXAM: CT CHEST WO   LOCATION: [facility]   DATE/TIME: 2023 10:05 AM CDT     INDICATION: Multiple Lung Nodules   Multiple lung nodules   COMPARISON: 2022.   TECHNIQUE: CT chest without IV contrast. Multiplanar reformats were obtained. Dose reduction techniques were used.   CONTRAST: None.     FINDINGS:   LUNGS AND PLEURA: Multiple nodules includin mm subsolid right lower lobe image 195 of series 7 is stable.   7 mm subsolid inferior lingula image 200, stable.   3 mm pleural-based left upper lobe image 121. Stable   3 mm triangular along the left major fissure image 132 consistent with an intrapulmonary lymph node, stable   5 mm left lower lobe image 179. Stable   4 mm left lower lobe subpleural image 174 previously 5 mm.   4 mm subpleural inferior left lower lobe image 231, stable   4 mm triangular along the right minor fissure right middle lobe image 161 new from the prior study but consistent with benign intrapulmonary lymph node.   2 mm right upper lobe along an accessory fissure image 163 3 mm on the prior study likely representing a intrapulmonary lymph node.   No suspicious or enlarging nodules or masses head   no consolidation, pneumothorax or pleural effusion.     MEDIASTINUM/AXILLAE: No lymphadenopathy. Normal heart size. No pericardial effusion. No thoracic aortic aneurysm.     CORONARY ARTERY CALCIFICATION: None.     UPPER ABDOMEN: Diffuse hepatic steatosis without focal hepatic lesion.     MUSCULOSKELETAL: Schmorl's node superior endplate of T12 no suspicious bony lesions or acute fractures.     ECHO 3/15/23 w/ good LV  "function:  CARDIAC ROUTINE ECHOCARDIOGRAM    Narrative  Performed by [employer]  Summary    1. Normal LV and RV size and systolic function.   LVEF 70%.    2. Mild TR.    Trace MR.    3. No prior studies for comparison.      Report Signatures  Amended by Teresa Chase on 3/16/2023 10:32 AM  Finalized by Ang Westfall on 3/15/2023 12:11 PM    9/15/17 PSG with SEVERE NEERU: AHI of 70, RDI of 81 w/ hypoxia reaching 85% SpO2.     BMI of 1738ckcal/day, Ffm of 117 lbs. Body fat 51.5%.   PHYSICAL EXAM:  Objective   /80   Ht 1.664 m (5' 5.5\")   Wt 110 kg (242 lb 8 oz)   BMI 39.74 kg/m    /80   Ht 1.664 m (5' 5.5\")   Wt 110 kg (242 lb 8 oz)   BMI 39.74 kg/m    Body mass index is 39.74 kg/m .  Physical Exam   GENERAL: alert and no distress  NECK: no adenopathy, no asymmetry, masses, or scars  RESP: lungs clear to auscultation - no rales, rhonchi or wheezes  CV: regular rate and rhythm, normal S1 S2, no S3 or S4, no murmur, click or rub, no peripheral edema  ABDOMEN: enlarged liver margin, nontender, no ascites evident. Central adiposity. Scratch w/ bandaid lower pannus. No cellulitis.  MS: no gross musculoskeletal defects noted, no edema. Mid shin compression socks.      In summary, Tessie Mcdaniels has Class II obesity with a body mass index of Body mass index is 39.74 kg/m . kg/m2 and the comorbidities stated above. She completed an informational seminar and is a possible candidate for the laparoscopic gastric bypass.  She will have to complete the following pre-requisites:      Today in the office we discussed gastric bypass surgery.  Preoperative, perioperative, and postoperative processes, management, and follow up were addressed.  Risks and benefits were outlined including the risk of death, staple line leak (1-2%), PE, DVT, ulcer, N/V, stricture, hernia, wound infection, weight regain, and vitamin deficiencies. I emphasized exercise and activity along with appropriate food choice as the main foundation for " weight loss with surgery providing surgical reinforcement of this. All questions were answered.  A goal sheet and support group handout were given to the patient. . We've covered the basics of bariatric method required for the optimization of their weight related health.  We've discussed how surgery will affect their future nutritional needs; including the general nutritional approach, vitamin supplementation and which chemical/medication irritants we wish to avoid following surgery to reduce the risk of complications. We expect to be an integral part of their on going health care following surgery and long term follow up is the expectation should it relate to their nutritional and supplement needs or bariatric anatomy. Once two years out from surgery, annual visits are the current recommendation to help maintenance of optimal weight related/bariatric health.      Once the patient has completed the requirements in their task list and there are no further recommendations, the pt will be allowed to see the surgeon of her choice for consultation on the laparoscopic gastric bypass surgery. Patient verbalizes understanding of the process to surgery and expectations for the postoperative period including the need for lifelong lifestyle changes, vitamin supplementation, and laboratory monitoring. For patients capable of child bearing, we discussed taking precautions to avoid pregnancy for at least the first 15-18 months following their procedure.  We do not recommend high progesterone options given the effects on appetite/weight and encouraged follow up with the patient's primary care provider for birth control options.  Sincerely,     Roldan Mills MD  1/31/2024  7:32 AM            Again, thank you for allowing me to participate in the care of your patient.        Sincerely,        Roldan Mills MD

## 2024-02-01 LAB
ALBUMIN SERPL BCG-MCNC: 4.4 G/DL (ref 3.5–5.2)
ALP SERPL-CCNC: 118 U/L (ref 40–150)
ALT SERPL W P-5'-P-CCNC: 68 U/L (ref 0–50)
ANION GAP SERPL CALCULATED.3IONS-SCNC: 11 MMOL/L (ref 7–15)
AST SERPL W P-5'-P-CCNC: 49 U/L (ref 0–45)
BILIRUB SERPL-MCNC: 0.2 MG/DL
BUN SERPL-MCNC: 20.3 MG/DL (ref 8–23)
CALCIUM SERPL-MCNC: 9.6 MG/DL (ref 8.8–10.2)
CHLORIDE SERPL-SCNC: 102 MMOL/L (ref 98–107)
CREAT SERPL-MCNC: 0.76 MG/DL (ref 0.51–0.95)
DEPRECATED HCO3 PLAS-SCNC: 25 MMOL/L (ref 22–29)
EGFRCR SERPLBLD CKD-EPI 2021: 89 ML/MIN/1.73M2
FERRITIN SERPL-MCNC: 129 NG/ML (ref 11–328)
GLUCOSE SERPL-MCNC: 168 MG/DL (ref 70–99)
IRON BINDING CAPACITY (ROCHE): 326 UG/DL (ref 240–430)
IRON SATN MFR SERPL: 21 % (ref 15–46)
IRON SERPL-MCNC: 67 UG/DL (ref 37–145)
LIPASE SERPL-CCNC: 34 U/L (ref 13–60)
MAGNESIUM SERPL-MCNC: 2.1 MG/DL (ref 1.7–2.3)
POTASSIUM SERPL-SCNC: 4.3 MMOL/L (ref 3.4–5.3)
PROT SERPL-MCNC: 8.1 G/DL (ref 6.4–8.3)
PTH-INTACT SERPL-MCNC: 45 PG/ML (ref 15–65)
SODIUM SERPL-SCNC: 138 MMOL/L (ref 135–145)
TSH SERPL DL<=0.005 MIU/L-ACNC: 0.4 UIU/ML (ref 0.3–4.2)
VIT B12 SERPL-MCNC: 339 PG/ML (ref 232–1245)
VIT D+METAB SERPL-MCNC: 38 NG/ML (ref 20–50)

## 2024-02-02 LAB
ANNOTATION COMMENT IMP: NORMAL
COPPER SERPL-MCNC: 120.6 UG/DL
RETINYL PALMITATE SERPL-MCNC: 0.04 MG/L
VIT A SERPL-MCNC: 0.81 MG/L
ZINC SERPL-MCNC: 72.7 UG/DL

## 2024-02-04 LAB — VIT B1 PYROPHOSHATE BLD-SCNC: 198 NMOL/L

## 2024-02-21 ENCOUNTER — VIRTUAL VISIT (OUTPATIENT)
Dept: SURGERY | Facility: CLINIC | Age: 59
End: 2024-02-21
Payer: COMMERCIAL

## 2024-02-21 DIAGNOSIS — Z71.3 NUTRITIONAL COUNSELING: ICD-10-CM

## 2024-02-21 DIAGNOSIS — E66.812 CLASS 2 SEVERE OBESITY DUE TO EXCESS CALORIES WITH SERIOUS COMORBIDITY AND BODY MASS INDEX (BMI) OF 39.0 TO 39.9 IN ADULT (H): ICD-10-CM

## 2024-02-21 DIAGNOSIS — K21.9 GASTROESOPHAGEAL REFLUX DISEASE WITHOUT ESOPHAGITIS: Primary | ICD-10-CM

## 2024-02-21 DIAGNOSIS — E66.01 CLASS 2 SEVERE OBESITY DUE TO EXCESS CALORIES WITH SERIOUS COMORBIDITY AND BODY MASS INDEX (BMI) OF 39.0 TO 39.9 IN ADULT (H): ICD-10-CM

## 2024-02-21 PROCEDURE — 97802 MEDICAL NUTRITION INDIV IN: CPT | Mod: 95 | Performed by: DIETITIAN, REGISTERED

## 2024-02-21 NOTE — PROGRESS NOTES
Tessie Mcdaniels is a 58 year old who is being evaluated via a billable video visit.      How would you like to obtain your AVS? MyChart  If the video visit is dropped, the invitation should be resent by: Send to e-mail at: [email or web address]  Will anyone else be joining your video visit? No        Initial Structured Weight Loss Supervised Diet Evaluation     Assessment:  Tessie is being seen today for initial RD nutritional evaluation. Patient has been unsuccessful with non-surgical weight loss methods and is interested in bariatric surgery. Today we reviewed current eating habits and level of physical activity, and instructed on the changes that are required for successful bariatric outcomes.      Surgery of interest per pt: RNY.    Workflow review:  Support Group: Not completed.  Psychology:In progress.  Lab work:Completed.  SWL:No       Weight goal: At or below initial.    Anthropometrics:  Pt's weight is 240 lbs   Initial weight: 242.5 lbs   Weight change: 2.5 lbs (down)   BMI: There is no height or weight on file to calculate BMI.   Ideal body weight: 58.2 kg (128 lb 3.2 oz)  Adjusted ideal body weight: 78.9 kg (173 lb 14.7 oz)    Medical History:  Patient Active Problem List   Diagnosis    Major depressive disorder, recurrent episode, severe (H)    NEERU (obstructive sleep apnea)    Obstructive sleep apnea    Gastroesophageal reflux disease without esophagitis    Globus sensation    Heartburn    Adjustment disorder with anxiety    History of tobacco use    BLAKE (acute kidney injury) (H24)    Alcohol use disorder, severe, dependence (H)    Anxiety    ASCUS of cervix with negative high risk HPV    Atrophic vaginitis    Chronic back pain    Eczema    History of alcoholism (H)    Hyperlipidemia    Hypocalcemia    Hypotension    Irritable bowel syndrome    Nodule of lower lobe of right lung    Opioid overdose (H)    Otalgia of both ears    Generalized anxiety disorder    Pre-diabetes    Class 2 severe obesity  "due to excess calories with serious comorbidity in adult (H)      Diabetes: No  HbA1c:  No results found for: \"HGBA1C\"      Nutrition History  Food allergies/intolerances/cultural or religous food customs: No     Vitamins/Mineral currently taking: None currently    Socioeconomic Status  Who does the grocery shopping for your household? Self    Who prepares your meals at home? Self    Diet Recall/Time  Breakfast: yogurt with granola or nuts or couple of eggs with a slice of toast or cottage cheese with fruit   Lunch: sandwich with meat and cheese, chips or pasta bar or leftovers from the night before   Dinner: spaghetti or hot dish or shrimp with garlic butter and asparagus or hamburger with a bun, cheese or pizza     Typical Snacks: chips or nachos with cheese    Overnight eating: No    Eating out: 2 times/week     Beverages  7 Up, Diet Coke, Juice diluted in water, Water with Lemon, Infused Water at work, coffee in the AM (mixed with decaf)    Exercise  Walking-especially while on vacation, around 2 miles/day, some swimming, does have a membership for [employer], some horseback riding     Nutrition Diagnosis (PES statement)    Obesity (NC 3.3) related to overeating and poor lifestyle habits as evidenced by patient's subjective statements and BMI 39.4 kg/m2.       Intervention    Nutrition Education:   Provided general overview of diet and lifestyle modifications needed to be a deemed a safe candidate for bariatric surgery.   Educated patient on how to read a food label: choosing foods with than 10 grams fat and 10 grams sugar per serving to avoid dumping syndrome.  Dumping Syndrome: Described the mechanisms of syndrome, symptoms, and prevention tools from a dietary perspective.   Vitamins: Educated on post-op vitamin regimen including MVI+ 18 mg Fe two times a day, calcium citrate 400-600 mg two times a day, 5437-1352 mcg sublingual B12 daily, 5000 IU vitamin D3 daily.     Food/Nutrient Delivery:  Educated " patient on eating three meals, with cutting out snacking.  Bariatric Plate: Patient and I discussed the importance of including a lean protein source (20-30 grams/meal), vegetables (included at lunch and dinner), one serving (15g) of carbohydrate, and limited added fat (1 tb/day) at each meal.   Educated patient on using a protein powder drink as a meal replacement and/or supplement after bariatric surgery.   Discussed importance of adequate hydration after surgery, with goal of at least 64 oz of fluids/day.  Addressed avoiding all carbonated, caffeinated and sweetened drinks to prepare for bariatric surgery.     Nutrition Counseling:  Mindful eating techniques: Encouraged slow meal pace, chewing foods to applesauce consistency for 20-30 minutes/meal.   Discussed  fluids 30 minutes before, during, and after meal to prevent dumping syndrome and discomfort post bariatric surgery.     Instructions/Goals:     Start implementing bariatric surgery lifestyle modifications.      Handouts Provided:   [facility] Bariatric Surgery Nutrition Info  Protein supplement list    Monitor/Evaluation:  Pt. s target weight: no gain from initial visit, patient verbalized understanding.     Plan for next visit:     (Final Supervised Diet visit with RD) pre/post-op  diet progression, give review of surgery process.        Video-Visit Details    Type of service:  Video Visit    Video Start Time (time video started): 12:50 PM     Video End Time (time video stopped): 1:26 PM    Originating Location (pt. Location): Home      Distant Location (provider location):  Off-site    Mode of Communication:  Video Conference via [employer]    Physician has received verbal consent for a Video Visit from the patient? Yes      Rosana Gardner RD

## 2024-02-21 NOTE — LETTER
2/21/2024         RE: Tessie Mcdaniels  [address]  [place] [address]        Dear Colleague,    Thank you for referring your patient, Tessie Mcdaniels, to the [facility] [place]. Please see a copy of my visit note below.    Tessie Mcdaniels is a 58 year old who is being evaluated via a billable video visit.      How would you like to obtain your AVS? MyChart  If the video visit is dropped, the invitation should be resent by: Send to e-mail at: [email or web address]  Will anyone else be joining your video visit? No        Initial Structured Weight Loss Supervised Diet Evaluation     Assessment:  Tessie is being seen today for initial RD nutritional evaluation. Patient has been unsuccessful with non-surgical weight loss methods and is interested in bariatric surgery. Today we reviewed current eating habits and level of physical activity, and instructed on the changes that are required for successful bariatric outcomes.      Surgery of interest per pt: RNY.    Workflow review:  Support Group: Not completed.  Psychology:In progress.  Lab work:Completed.  SWL:No       Weight goal: At or below initial.    Anthropometrics:  Pt's weight is 240 lbs   Initial weight: 242.5 lbs   Weight change: 2.5 lbs (down)   BMI: There is no height or weight on file to calculate BMI.   Ideal body weight: 58.2 kg (128 lb 3.2 oz)  Adjusted ideal body weight: 78.9 kg (173 lb 14.7 oz)    Medical History:  Patient Active Problem List   Diagnosis     Major depressive disorder, recurrent episode, severe (H)     NEERU (obstructive sleep apnea)     Obstructive sleep apnea     Gastroesophageal reflux disease without esophagitis     Globus sensation     Heartburn     Adjustment disorder with anxiety     History of tobacco use     BLAKE (acute kidney injury) (H24)     Alcohol use disorder, severe, dependence (H)     Anxiety     ASCUS of cervix with negative high risk HPV     Atrophic  "vaginitis     Chronic back pain     Eczema     History of alcoholism (H)     Hyperlipidemia     Hypocalcemia     Hypotension     Irritable bowel syndrome     Nodule of lower lobe of right lung     Opioid overdose (H)     Otalgia of both ears     Generalized anxiety disorder     Pre-diabetes     Class 2 severe obesity due to excess calories with serious comorbidity in adult (H)      Diabetes: No  HbA1c:  No results found for: \"HGBA1C\"      Nutrition History  Food allergies/intolerances/cultural or religous food customs: No     Vitamins/Mineral currently taking: None currently    Socioeconomic Status  Who does the grocery shopping for your household? Self    Who prepares your meals at home? Self    Diet Recall/Time  Breakfast: yogurt with granola or nuts or couple of eggs with a slice of toast or cottage cheese with fruit   Lunch: sandwich with meat and cheese, chips or pasta bar or leftovers from the night before   Dinner: spaghetti or hot dish or shrimp with garlic butter and asparagus or hamburger with a bun, cheese or pizza     Typical Snacks: chips or nachos with cheese    Overnight eating: No    Eating out: 2 times/week     Beverages  7 Up, Diet Coke, Juice diluted in water, Water with Lemon, Infused Water at work, coffee in the AM (mixed with decaf)    Exercise  Walking-especially while on vacation, around 2 miles/day, some swimming, does have a membership for [employer], some horseback riding     Nutrition Diagnosis (PES statement)    Obesity (NC 3.3) related to overeating and poor lifestyle habits as evidenced by patient's subjective statements and BMI 39.4 kg/m2.       Intervention    Nutrition Education:   Provided general overview of diet and lifestyle modifications needed to be a deemed a safe candidate for bariatric surgery.   Educated patient on how to read a food label: choosing foods with than 10 grams fat and 10 grams sugar per serving to avoid dumping syndrome.  Dumping Syndrome: Described the " mechanisms of syndrome, symptoms, and prevention tools from a dietary perspective.   Vitamins: Educated on post-op vitamin regimen including MVI+ 18 mg Fe two times a day, calcium citrate 400-600 mg two times a day, 2395-2987 mcg sublingual B12 daily, 5000 IU vitamin D3 daily.     Food/Nutrient Delivery:  Educated patient on eating three meals, with cutting out snacking.  Bariatric Plate: Patient and I discussed the importance of including a lean protein source (20-30 grams/meal), vegetables (included at lunch and dinner), one serving (15g) of carbohydrate, and limited added fat (1 tb/day) at each meal.   Educated patient on using a protein powder drink as a meal replacement and/or supplement after bariatric surgery.   Discussed importance of adequate hydration after surgery, with goal of at least 64 oz of fluids/day.  Addressed avoiding all carbonated, caffeinated and sweetened drinks to prepare for bariatric surgery.     Nutrition Counseling:  Mindful eating techniques: Encouraged slow meal pace, chewing foods to applesauce consistency for 20-30 minutes/meal.   Discussed  fluids 30 minutes before, during, and after meal to prevent dumping syndrome and discomfort post bariatric surgery.     Instructions/Goals:     Start implementing bariatric surgery lifestyle modifications.      Handouts Provided:   [facility] Bariatric Surgery Nutrition Info  Protein supplement list    Monitor/Evaluation:  Pt. s target weight: no gain from initial visit, patient verbalized understanding.     Plan for next visit:     (Final Supervised Diet visit with RD) pre/post-op  diet progression, give review of surgery process.        Video-Visit Details    Type of service:  Video Visit    Video Start Time (time video started): 12:50 PM     Video End Time (time video stopped): 1:26 PM    Originating Location (pt. Location): Home      Distant Location (provider location):  Off-site    Mode of Communication:  Video Conference  via [facility]    Physician has received verbal consent for a Video Visit from the patient? Yes      Rosana Gardner RD        Again, thank you for allowing me to participate in the care of your patient.        Sincerely,        Rosana Gardner RD

## 2024-02-22 ENCOUNTER — VIRTUAL VISIT (OUTPATIENT)
Dept: SURGERY | Facility: CLINIC | Age: 59
End: 2024-02-22
Payer: COMMERCIAL

## 2024-02-22 DIAGNOSIS — E66.01 MORBID OBESITY (H): Primary | ICD-10-CM
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
## 2024-03-11 ENCOUNTER — VIRTUAL VISIT (OUTPATIENT)
Dept: SURGERY | Facility: CLINIC | Age: 59
End: 2024-03-11
Payer: COMMERCIAL

## 2024-03-11 DIAGNOSIS — E66.01 MORBID OBESITY (H): Primary | ICD-10-CM

## 2024-03-11 NOTE — PROGRESS NOTES
Virtual Visit Details    Type of service:  Video Visit   Video Start Time:  11 AM  Video End Time: 11:38 AM    Originating Location (pt. Location): Home    Distant Location (provider location):  Off-site  Platform used for Video Visit: Tish    Tessie has made some minor changes in eating and lifestyle, but still needs to be more mindful about her eating. She will follow up with a list of activities and mindful eating strategies. She also needs to complete the MMPI-3;. F33.1; F43.10; alcohol use disorder, in early sustained remission; E66.01

## 2024-03-25 ENCOUNTER — TELEPHONE (OUTPATIENT)
Dept: DERMATOLOGY | Facility: CLINIC | Age: 59
End: 2024-03-25
Payer: COMMERCIAL

## 2024-03-25 NOTE — TELEPHONE ENCOUNTER
Prior Authorization Specialty Medication Request    Medication/Dose:Desoximetasone 0.25% ointment    Diagnosis and ICD code (if different than what is on RX):    New/renewal/insurance change PA/secondary ins. PA:  Previously Tried and Failed:      Important Lab Values:   Rationale:     Insurance   Primary:   Insurance ID:      Secondary (if applicable):  Insurance ID:      Pharmacy Information (if different than what is on RX)  Name:    Phone:    Fax:

## 2024-04-01 ENCOUNTER — VIRTUAL VISIT (OUTPATIENT)
Dept: SURGERY | Facility: CLINIC | Age: 59
End: 2024-04-01
Payer: COMMERCIAL

## 2024-04-01 DIAGNOSIS — E66.01 MORBID OBESITY (H): Primary | ICD-10-CM
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
## 2024-06-29 ENCOUNTER — HEALTH MAINTENANCE LETTER (OUTPATIENT)
Age: 59
End: 2024-06-29

## 2024-07-11 ENCOUNTER — VIRTUAL VISIT (OUTPATIENT)
Dept: SURGERY | Facility: CLINIC | Age: 59
End: 2024-07-11
Payer: COMMERCIAL

## 2024-07-11 VITALS — WEIGHT: 227 LBS | BODY MASS INDEX: 36.48 KG/M2 | HEIGHT: 66 IN

## 2024-07-11 DIAGNOSIS — E66.812 CLASS 2 SEVERE OBESITY DUE TO EXCESS CALORIES WITH SERIOUS COMORBIDITY AND BODY MASS INDEX (BMI) OF 37.0 TO 37.9 IN ADULT (H): Primary | ICD-10-CM

## 2024-07-11 DIAGNOSIS — E66.01 CLASS 2 SEVERE OBESITY DUE TO EXCESS CALORIES WITH SERIOUS COMORBIDITY AND BODY MASS INDEX (BMI) OF 37.0 TO 37.9 IN ADULT (H): Primary | ICD-10-CM

## 2024-07-11 DIAGNOSIS — K76.9 LIVER DISEASE, UNSPECIFIED: ICD-10-CM

## 2024-07-11 PROCEDURE — 99214 OFFICE O/P EST MOD 30 MIN: CPT | Mod: 95 | Performed by: EMERGENCY MEDICINE

## 2024-07-11 RX ORDER — PREDNISOLONE ACETATE 10 MG/ML
SUSPENSION/ DROPS OPHTHALMIC
COMMUNITY

## 2024-07-11 RX ORDER — CEPHALEXIN 500 MG/1
CAPSULE ORAL
COMMUNITY

## 2024-07-11 RX ORDER — ONDANSETRON 4 MG/1
TABLET, ORALLY DISINTEGRATING ORAL
COMMUNITY
Start: 2024-04-05

## 2024-07-11 RX ORDER — METHOCARBAMOL 750 MG/1
TABLET, FILM COATED ORAL
COMMUNITY
Start: 2023-05-04

## 2024-07-11 RX ORDER — OXYCODONE HYDROCHLORIDE 5 MG/1
TABLET ORAL
COMMUNITY

## 2024-07-11 RX ORDER — LORAZEPAM 1 MG/1
TABLET ORAL
COMMUNITY
Start: 2024-04-05

## 2024-07-11 RX ORDER — BUSPIRONE HYDROCHLORIDE 15 MG/1
TABLET ORAL
COMMUNITY
Start: 2024-03-28

## 2024-07-11 RX ORDER — TIZANIDINE 2 MG/1
2 TABLET ORAL
COMMUNITY

## 2024-07-11 RX ORDER — DOXYCYCLINE HYCLATE 100 MG
TABLET ORAL
COMMUNITY

## 2024-07-11 RX ORDER — CELECOXIB 100 MG/1
100 CAPSULE ORAL
COMMUNITY

## 2024-07-11 ASSESSMENT — PAIN SCALES - GENERAL: PAINLEVEL: MODERATE PAIN (4)

## 2024-07-11 NOTE — PATIENT INSTRUCTIONS
Plan   We'll conitnue with non surgical, medicaiton supported weight loss this year to see if we can stabilize BMI under 30. If struggling to get under 35 BMI, and a year of sobriety w stable mental health then we'll get back in touch with Dr. Edgar for re-evaluation of clearance process.     Continue working with dietician. Set up visit this summer and recheck with me on progress this Fall.       Compounded Tirzepatide through your PCP currently, ramping up to a dose between 5-7.5mg/week over the next couple of months would recommended if affordably covered. As your PCP ordered it from a compounding pharmacy I'm not familiar with, I cannot continue to order unless you desire it through the [facility].     Track intake with an capo like [employer]. I'd recommend aiming for 1400-1500kcal/day with 75-80grams of lean protein with your light exercise routines (bare minimum of 1250kcal/day with 70g of lean protein). Separate beverages from meals by 20 minutes and hydrate well between meals to hit intake goal of 80 oz/day of water.     Continue walks/swims and consider some back PT if capable.           LEAN PROTEIN SOURCES  Getting 20-30 grams of protein, 3 meals daily, is appropriate for most people, some need more but more than about 40 grams per meal is not useful.  General rule is drinking one ounce of water per gram of protein eaten over the course of the day:  70 grams of protein each day, drink 70 oz of water.  Protein Source Portion Calories Grams of Protein                           Nonfat, plain Greek yogurt    (10 grams sugar or less) 3/4 cup (6 oz)  12-17   Light Yogurt (10 grams sugar or less) 3/4 cup (6 oz)  6-8   Protein Shake 1 shake 110-180 15-30   Skim/1% Milk or lactose-free milk 1 cup ( 8 oz)  8   Plain or light, flavored soymilk 1 cup  7-8   Plain or light, hemp milk 1 cup 110 6   Fat Free or 1% Cottage Cheese 1/2 cup 90 15   Part skim ricotta cheese 1/2 cup  100 14   Part skim or reduced fat cheese slices 1 ounce 65-80 8     Mozzarella String Cheese 1 80 8   Canned tuna, chicken, crab or salmon  (canned in water)  1/2 cup 100 15-20   White fish (broiled, grilled, baked) 3 ounces 100 21   [place]/Tuna (broiled, grilled, baked) 3 ounces 150-180 21   Shrimp, Scallops, Lobster, Crab 3 ounces 100 21   Pork loin, Pork Tenderloin 3 ounces 150 21   Boneless, skinless chicken /turkey breast                          (broiled, grilled, baked) 3 ounces 120 21   [place], Patillas, [place], and Venison 3 ounces 120 21   Lean cuts of red meat and pork (sirloin,   round, tenderloin, flank, ground 93%-96%) 3 ounces 170 21   Lean or Extra Lean Ground Turkey 1/2 cup 150 20   90-95% Lean [place] Burger 1 dax 140-180 21   Low-fat casserole with lean meat 3/4 cup 200 17   Luncheon Meats                                                        (turkey, lean ham, roast beef, chicken) 3 ounces 100 21   Egg (boiled, poached, scrambled) 1 Egg 60 7   Egg Substitute 1/2 cup 70 10   Nuts (limit to 1 serving per day)  3 Tbsp. 150 7   Nut [place] (peanut, almond)  Limit to 1 serving or less daily 1 Tbsp. 90 4   Soy Burger (varies) 1  15   Garbanzo, Black, [place] Beans 1/2 cup 110 7   Refried Beans 1/2 cup 100 7   Kidney and Lima beans 1/2 cup 110 7   Tempeh 3 oz 175 18   Vegan crumbles 1/2 cup 100 14   Tofu 1/2 cup 110 14   Chili (beans and extra lean beef or turkey) 1 cup 200 23   Lentil Stew/Soup 1 cup 150 12   Black Bean Soup 1 cup 175 12           .On-the-Go Breakfast Ideas  As of 2015, the latest research shows what a huge impact eating breakfast has on losing weight and feeling your best. People lose more weight when they make breakfast their biggest meal of the day compared to Dinner, but even if you cannot go to that degree, getting a breakfast that has at least 20 grams of protein and even a moderate amount of fat is ideal for maintaining good energy through the day and limits overeating in the  evening hours.  The following are some quick and easy suggestions for at least getting something of substance into your body in the morning.  Enjoy!    Eating breakfast within 90 minutes of waking up is an important part of taking care of your body on a restricted calorie diet plan.  After sleeping for hours, your body is in need of fuel.  An ideal breakfast is a combination of protein, whole-grain carbohydrates, or fruit.  Here s why:    -Protein digests very slowly in the body, helping you feel more satisfied.  -Whole grains provide dietary fiber, which also digests slowly and helps keep your gut clean.  -Fruit is a great source of vitamins, minerals, and fiber.     Each one of these breakfast combinations has between 200-300 calories and 15-20 grams of protein.  Feel free to mix and match!    Bone Broth (chicken bone broth or beef bone broth) is a great way to boost protein content. 8oz of bone broth will typically have 9-12grams of protein for 40kcal of energy.    Protein: Choose  -1/2 cup low-fat cottage cheese  -2 hard boiled eggs , or one cooked in olive oil (low/slow heat).  -1 low fat string cheese stick  -1 Tablespoon natural peanut butter  -Talkdesk vegetarian sausage dax (found in freezer section)  -1 slice lowfat cheese  -6 oz 2% or lowfat Greek yogurt, such as Fage or Oikos.    PLUS    Whole Grains:  Choose   -1 whole wheat English muffin  -1 whole wheat angel, half  -1/2 Fiber One frozen muffin, thawed  -1/2 Fiber One toaster pastry  -1 whole wheat bagel thin  -1/2 cup Kashi cereal  -1 Kashi waffle (or other whole grain high-fiber waffle)  Aim for whole grain/sprouted breads with at least 3g of fiber/slice if having bread. [place] is one such brand.    OR    Fruit: Choose  -1/3 cup blueberries  -1/2 banana (or a plantain- similar to a banana, yet smaller)  -1/2 cup cantaloupe cubes  -1 small apple  -1 small orange  -1/2 cup strawberries  -handful raspberries/blackberries (each berry is  about 1 calorie).    *Adapted from [employer], Fall 20    Ten Breakfasts Under 250 calories    Ideally, getting between 350-600 calories  (depending on starting height and weight)for breakfast is ideal for avoiding hunger later in the day, adjust/add to the following accordingly:    One- 250 calories, 8.5 g protein  1 slice whole-grain toast   1 Tbsp peanut butter    banana    Two- 250 calories, 8 g protein    cup nonfat/lowfat yogurt  1/3rd cup diced no-sugar peaches  1/3rd cup cereal (like Special K, Cheerios, or bran flakes)    Three- 250 calories, 25 g protein  1 egg scrambled with 1 oz skim milk    cup shredded cheddar    whole grain English muffin  1 oz [place] dubois  1 tsp margarine spread    Four- 225 calories, 25 g protein  1/2 cup Kashi Go-Lean cereal    cup skim milk mixed with 1 scoop Bariatric Advantage protein powder    cup no-sugar diced pears    Five- 250 calories, 20 g protein    cup oatmeal prepared with skim milk, 1 scoop protein powder, and sugar-free maple syrup    Six- 200 calories, 5 g protein  1 whole grain waffle, toasted  1 tablespoon creamy peanut or almond butter    Seven-  250 calories, 19 g protein  Breakfast sandwich: 1 slice whole grain toast, cut in half.  Add 1 scrambled egg and one slice cheddar  cheese.    Eight-  250 calories, 15 g protein  2 eggs scrambled with 1/3 cup frozen spinach (heat before adding to eggs) and 2 tablespoons low fat cream cheese.    Nine-  150 calories, 15 g protein  2/3rd cup cottage cheese    cup cantaloupe    Ten- 200 calories, 20 g protein  Fruit smoothie made with 4 oz. nonfat Greek yogurt,   cup berries, 1 scoop protein powder, and 4 oz skim milk.    Ten Lunches Under 250 Calories    Aim for lunch to be around 300-400 calories a day when trying to lose weight and get that protein in!    One- 200 calories, 11 g protein  1/3 cup tuna salad made with light jones on 1 slice whole grain bread  1 small peeled apple    Two- 250 calories, 16 g  protein  1/3 cup lowfat cottage cheese    cup cooked green beans    small fruit cocktail (in natural juice)    Three- 200 calories, 11 g protein    grilled cheese sandwich on whole grain bread with lowfat cheese  2/3rd cup of tomato soup    Four- 250 calories, 22 g protein  Deli wrap: 1 oz sliced turkey, 1 oz sliced ham, 1 oz sliced chicken rolled up with 1 slice low-fat cheese  1 small orange    Five- 250 calories, 28 g protein  2/3rd cup chili with 1 oz shredded cheese  4 saltine crackers    Six- 250 calories, 22 g protein  1 cup fresh spinach with 2 oz chicken, 1/3rd cup mandarin oranges, and 2 tablespoons sliced almonds with 1 tablespoon  vinaigrette dressing    Seven- 200 calories, 11 g protein  1 Tbsp sugar-free preserves and 1 Tbsp peanut butter on 1 slice whole grain toast    cup nonfat/lowfat Greek yogurt    Eight- 250 calories, 18 g protein  1 small soft-shell chicken taco with 1 oz shredded cheese, lettuce, tomato, salsa, and 1 Tbsp light sour cream    cup black beans    Nine- 225 calories, 13 g protein  2 ounces baked chicken  1/4 cup mashed potatoes    cup green beans    Ten- 200 calories, 21 g protein  Deli angel: 2 oz roast beef or other deli meat with 1 tsp Ming mayonnaise and sliced tomato, onion, and lettuce  1/3rd cup cottage cheese      Ten Dinners Under 300 calories    If you're eating a large breakfast and medium lunch, keep dinner small.  300-400 calories is ideal for most people depending on their caloric needs.    One- 300 calories, 12 g protein  1-inch thick slice of turkey meatloaf    cup baked butternut squash    Two- 200 calories, 9 g protein  Bread-less BLT: 3 slices turkey dubois, sliced tomato, wrapped in a large lettuce leaf    cup peeled fruit    Three- 275 calories, 36 g protein  3 oz roasted chicken    cup cooked broccoli    cup shredded cheddar cheese    cup unsweetened applesauce    Four- 200 calories, 25 g protein  3 oz baked tilapia  1/3rd cup cooked carrots    cup  yogurt    Five- 250 calories, 20 g protein  Grilled ham  n  Swiss: spread 2 tsp ghee or butter on 1 slice of whole grain bread.  Cut bread in half, layer 2 oz deli ham with 1 piece of Swiss cheese and grill until cheese is melted.    cup cooked vegetables    Six- 250 calories, 18 g protein  Vegetarian cheeseburger: 1 [place] cheeseburger topped with lettuce, onion, tomato, and ketchup/mustard    cup sweet potato fries    Seven- 250 calories, 18 g protein  Pork pot roast: 2 oz roasted pork loin, 1/3rd cup roasted carrots,   medium potato, cooked with   cup gravy    Eight- 330 calories, 25 g protein  2 oz meatballs (about 2 small meatballs)    cup spaghetti sauce  1/2 piece toast topped with 1 tsp ghee or butterand topped with garlic powder, toasted in oven    Nine- 250 calories, 16 g protein  Mexican pizza: one 8  corn tortilla topped with 2 oz chicken,   cup salsa, 2 tablespoons black beans, 2 tablespoons shredded cheese.  Bake until cheese is melted.    Ten- 250 calories, 22 g protein  Shrimp stir-poon: 3 oz cooked shrimp, 1/6th onion,   pepper,   cup chopped carrots sautéed in 1 tablespoon olive oil, topped with 2 tablespoons stir poon sauce and a pinch of sesame seeds        150 Calories or Less Snack Ideas   1 hardboiled egg with   cup berries  1 small apple with 1 hardboiled egg  10 almonds with   cup berries  2 clementines with 1 light string cheese  1 light string cheese with   sliced apple  1 light string cheese wrapped in 2 slices of turkey  4 100% whole wheat crackers (e.g. Triscuit) with 1 light string cheese    c. cottage cheese with   cup fruit and 1 Tbsp sunflower seeds     cup cottage cheese with   of an avocado     can tuna fish with 1 cup sliced cucumbers     cup roasted garbanzo beans with paprika and cayenne pepper    baked sweet potato with   cup chili beans or   cup cottage cheese  2 oz. nitrate free turkey slices with 1 cup carrots  1 container (6 oz) of low sugar (less than 10 grams of sugar) greek  yogurt   3 Tablespoons of hummus with 1 cup sliced bell peppers   2 Tablespoons of hummus with 15 baby carrots  4 Tablespoons ranch dip made with plain Greek Yogurt and 3 mini cucumbers  1/4 cup nuts (any kind)  1 Tablespoon peanut butter with 1 stalk celery   1 dill pickle wrapped in 1-2 slices of deli ham with 1 tsp of mayonnaise/mustard.    Example Meal Plan for a 1476-1500 Calorie Diet:    In order to fuel your weight loss properly and avoid hunger-induced overeating later in the day, for your height and weight, you will enjoy the most success by following the diet below or similar with adjustments based on your particular tastes and preferences.  Exercise may influence speed, amount of weight loss further.     I recommend getting into a meal routine and keeping it similar day to day in the beginning so you don t have to think too hard about what you re going to make/eat.  Keep snacks healthy, ideally containing protein and some vegetables.  Non-processed food is preferable to packaged items.  Eat at least a few crunchy green vegetables if having a snack, which should be 2-3 hours after your mealtimes(prepare these ahead of time for ease of use).  Drink 64 oz -80 oz of water daily for most, some of you will need more and we'll discuss it at your visit if that is the case.      When changing our diet,  we can often mistake thirst for hunger or just have some distracted eating habits that we need to break free from ('bored/mindless eating', screen time,work, driving,etc).  A glass of water and reconsideration of our hunger is often all that is needed.  Having the urge is not the problem, but watching it pass by without acting on it is the goal.    If you re having hunger problems, add a protein drink/snack to your morning hours or afternoon snack with at least 20grams of protein and not too much sugar (under 10g).  A carton of higher protein/low sugar yogurt can work as well.  If the urge to snack is overwhelming  and not satiated, try going for a 10 minute walk/exercise, come home and drink a glass of water and if still hungry, have a  calorie snack (handful of raw/sprouted nuts, veggies and string cheese, protein bar, etc).  Savor it.    It is better to have a large breakfast, a moderate lunch and a smaller dinner to fuel your day.  People lose 10-15% more weight during their weight loss season with this strategy. Optimizing your protein intake at each meal will further keep you more satisfied while eating less food overall.  Getting exercise in early has also been shown to offer the best results (before breakfast ideally but anytime is the right time to exercise if that is not an option for you).    To make sure you re getting adequate vitamins and minerals during weight loss, I recommend one complete multivitamin a day of your choice.  Consider a probiotic and taking some vitamin D 2000 IU daily.    Let supper be your last meal of the day and ideally try to have at least 12 hours between supper and breakfast the next day to tap into some beneficial overnight fasting dynamics.  Midnight snacks need to go away. Water in the evening is fine, unsweetened, non caffeinated herbal tea is helpful as well.  Consolidating your meals within a 8-12 hour period of your day will help tap into these additional metabolic benefits and tends to keep your appetite up for breakfast, further helping to stay on track.  For most of my patients, I don't recommend an intermittent fasting style diet (many find it hard to fit in their lifestyle) but an overnight fast is very doable for most patients and helps regulate our hunger drives a little better.  This makes it very important to nail good intake at all three meals to feel satisfied/energized and still lose weight.      If evening snacking desires are high, consider a glass of fiber supplement for some additional fullness (metamucil or similar). Most of us don't get the 25-30 grams per  day of fiber that promotes good gut health/satiety.  Benefiber, metamucil, citrucel are reasonable/affordable options for most people.  Inulin, chicory, psyllium husk are reasonable options but start slow and low in the dose to avoid gas/bloating until your gut gets acclimated (ramping up to 5-10 grams per day of supplemental fiber after 3-4 weeks if needed).      Example Meal Plan:  Breakfast: 450-475 Calories  1 egg cooked on low in olive oil:   calories.  5oz Greek Yogurt (Fage plain classic: ~150 daniel)  Handful of Berries of your choice (about a calorie per berry or 20-40cal per handful)    cup(cooked) of  old fashioned oatmeal or 1/2 cup(cooked) steel cut oats. (150 daniel)  Sprinkle amount of brown sugar and a pat of butter. (40 daniel)  Glass of  Water  Black coffee or unsweetened Tea (0calories).      2-3 hours Later Snack: (195 calories).  Glass of water  One string Cheese (80 calories) or 4 oz creamed cottage cheese (115 calories) with  Crunchy Celery sticks (less than 10 calories per large stalk) 2 stalks. (20 calories)    of a  Large Banana or   of a Large Apple (60 calories):  eat second half at lunch or afternoon snack.     Lunch:300 -350 calories   Chicken Breast  (baked/broiled/roasted/grilled)  4-6 oz.  (125-180 daniel), BBQ sauce/hot sauce/mustard/seasoning is free. Just use a reasonable amount. Or a can of tuna with 1 tablespoon mayonnaise.  Salad: lettuce, any other veggies (cucumbers, green peppers/celery you like and a small drizzle of dressing to just flavor.  Go as big on the veggies as you like,  as they are practically calorie free.   A whole, 8 inch cucumber is 45 calories, a whole green pepper is 23 calories, a stalk of celery is 9 calories.  [place] Dressing is 60 calories per tablespoon..so moderate your desired dressing or do a drizzle of olive oil and splash of balsamic vinegar on top,  Total calories unlikely to be over 150 even with dressing.  Glass of Water.    Option for lunch  is meal replacement protein drink/smoothie.  Need at least 20 grams of protein and eat the rest of your apple/banana from the morning snack.      Afternoon Snack: 150-200 calories   Cheese Stick or cottage cheese again  and a fresh fruit OR  Granola Bar (protein Bar acceptable if under 200 calories OR  Homemade smoothies:  8oz skim milk,  a handful of berries (fresh or frozen and a serving of protein powder such as BiPro or Adri sWhey for example.  If you don't like dairy, make with 8oz water, one small banana, handful of berries and the protein powder, add any veggies you want as well:  roughly 200 calories.   Glass of Water    Dinner: 325 calories  4oz of fresh, [place] salmon.  Broiled (salt/pepper/dill) for about 8-8.5 minutes (200calories) or  4oz filet mignon steak or sirloin steak  Salad or vegetable sautéed lightly in olive oil or   Broccoli 1.5  cups chopped and steamed  or micro-waved in a little water (75 calories)  Glass of Water,    Cup of herbal tea (unsweetened, caffeine free)      Herbs and seasonings are encouraged to flavor your foods/vegetables.  Make your food delicious.      Tips for Success:  1.  Prepare proteins ahead of time (broil chicken breasts in bulk so you can grab and go), steel cut oats/lentils can be stored in casserole dish/bowl in the fridge for quick scoop in the morning and rewarm in microwave, make use of crock pot recipes (watch salt content).  Making meals that cover 3-4 future meals is an easy way to stay on track.  2.  Drink a 8-12 oz glass of water every 2-3 hours when awake.  We often mistake hunger for thirst, especially when losing weight.  3. Remember your Reward and Motivation when things get hard.  4.  Weigh yourself every morning and record, you'll stay on track better and learn how our biorhythms, diet and elimination patterns show up on the scale. Don't worry about 1 or 2 day patterns, but when on track you'll notice good trend downward of weight over 3-4 day  "segments.  Plateaus tend to resolve after 4-8 days in most cases if you stay consistent with your plan.  These are natural and part of weight loss, even if you're perfect with your plan execution.  5. Call if problems/concerns.  [employer] is a great tool to stay in touch and provide weekly outside accountability. Check in with questions or if you want to brag.  6.  Find a handful of meals/foods that keep you on track and feeling good and get into a routine that is sustainable for you.  It's OK to have a routine that works for you.  7.  Consider taking a complete multivitamin just to make sure all micronutrients are adequate during weight loss.  8. If losing hair/brittle nails it usually means you are not taking enough protein.  Minimum goal is 60 grams daily of protein for smaller women, 80 grams a day for men. Consider taking Biotin as supplement or a \"Hair and Nail\" multivitamin.        Zepbound (Tirzepatide) is a very effective satiety boosting appetite suppressant that elevates satiety hormones GLP1 and GIP. It needs to be ramped up slowly to be tolerated adequately.  About 1/10 people will not tolerate this medication. Each month, you move up to a higher dose until eventually reaching the 10mg/week dose if tolerated with further ramping to follow if needed. If intolerant or severe side effects, a dose decrease would be wise, so keep me posted if not tolerated the ramping well. This may be a longer term medication based on individual needs/physiology and appetite control.     Injections can be given after cleansing the skin with alcohol prep pad or swab (available OTC).     Stop Zepbound if severe abdominal pain/vomiting/rash/throat swelling or constant nausea that prevents adequate food/water intake. Stop 2-3 weeks prior to any planned general anesthesia surgeries to reduce risk for something called a post operative ileus.     Gallstones can occur in about 1% of patients on this medication so update me if " increase right upper abdominal pain after eating.     Start meals with protein first, separate beverages from meals by 20 minutes and work hard in between meals to get your 64-75 oz of water daily to reduce risks for severe constipation. Consider a fiber supplement like powdered psyllium husk in 12 oz water each night, stool softerners as needed and Miralax or milk of Magnesia if more than 3 days have passed without a Bowel Movement.     Check out [employer] for patient resources.  If you have weekends off, I recommend dosing Friday evenings.     Some people starve on this medication if not mindful about food intake. I recommend starting meals with the protein part of your meal first, chew thoroughly and separate beverages from meals by about 20 minutes to make sure you get your nourishment in first. Include vegetables/complex carbohydrates and unsaturated fat as part of your balanced diet but group these at the end of the meal, after your protein is mostly gone. Satiety will kick in too early if drinking too much with meals and under-nourishment can result.     It's not a bad idea to take a complete multivitamin most days of the week if using this medication. Adequate hydration is essential for feeling your best, efficient fat burning, waste elimination and constipation prevention. For those without fluid restrictions due to other disease, the goal is at least one ounce of water per gram of protein consumed with a  minimum of 64oz/day goal. Stool softners and fiber supplements as well as occasional laxative use (miralax, etc) may be necessary if getting too constipated.    Pancreatitis is a very rare but potentially serious side effect. Stop Zepbound if severe mid abdominal pain/burning in nature or if unable to eat/drink due to severe nausea/discomfort.   People with strong history of pancreatitis without clear cause should stay clear of this medication as should those planning to get pregnant, those with strong  personal or family history for medullary thyroid cancer or Multiple Endocrine Neoplasia (rare).     Stop Zepbound at least 2 weeks prior to any planned surgery.    Kind Regards,  Roldan Mills MD  [facility]

## 2024-07-11 NOTE — PROGRESS NOTES
"Virtual Visit Details    Type of service:  Video Visit   Video Start Time: 11:01 AM  Video End Time:11:36 AM    Originating Location (pt. Location): Other [place]    Distant Location (provider location):  On-site  Platform used for Video Visit: [facility]    Outpatient Bariatric Medicine Progress Note-Structured Weight Loss   Indication: Medical bariatric therapy to review progress towards bariatric surgery.      Surgery:   TBD    Surgeon:  AMIRA    Impression     59 year old female with Body mass index is 37.19 kg/m . in the setting of morbid obesity which satisfies the NIH criteria for bariatric surgery. She is following up today after an introductory visit on 1/31/24 in our surgical program. She was deemed to be a poor candidate from a psychological readiness standpoint after her evaluation with our Bariatric Psychologist, Dr. Edgar with his note on 4/1/24 indicating that she had been ambivalent about proceeding towards surgery and had just recently become sober in December of 2023. His assessment for future progression to surgery was:  \"In order to be deemed a viable candidate for bariatric surgery, this patient will need to complete the following treatment recommendations: Continue individual psychotherapy to focus on improved coping mechanisms, maintain medication management to promote mood stability, maintain sobriety, attend support group meetings to improve knowledge of surgery and support around surgery, continue to see the bariatric dietitian to ensure appropriate nutrition and eating behaviors, maintain mindful eating behaviors and follow up with this clinician only after she is 100% certain she wants to proceed with surgery, she has maintain sobriety for a year and her therapist feels she is in a good position to proceed.\" .    Upon review today, she would like to proceed to surgery eventually but isn't committed yet and working on medication support at present for 2024 and we reviewed combined medication " therapy with mindful diet.. She reports 16 lbs weight reduction this year, 6.6% total body weight reduction. Her Victoza has been stopped as she's ramped up Zepbound (compounded version at reduced dose that started 2 weeks ago at 1.5mg/week through her PCP). Encouraged ramping to 5-7.5mg/week depending on cost/affordability over the next 2 months.     Chart review shows dietician visit on 2/21/24. Will set up secondary visit.    Just met w/ her pain clinic.  Not interested in pain pump options.    Will walk a couple miles weekly and swimming regularly this summer 2x weekly. Water aerobics for about 30-60 minutes.   Comorbidities:  Patient Active Problem List   Diagnosis    Major depressive disorder, recurrent episode, severe (H)    NEERU (obstructive sleep apnea)    Obstructive sleep apnea    Gastroesophageal reflux disease without esophagitis    Globus sensation    Heartburn    Adjustment disorder with anxiety    History of tobacco use    BLAKE (acute kidney injury) (H24)    Alcohol use disorder, severe, dependence (H)    Anxiety    ASCUS of cervix with negative high risk HPV    Atrophic vaginitis    Chronic back pain    Eczema    History of alcoholism (H)    Hyperlipidemia    Hypocalcemia    Hypotension    Irritable bowel syndrome    Nodule of lower lobe of right lung    Opioid overdose (H)    Otalgia of both ears    Generalized anxiety disorder    Pre-diabetes    Class 2 severe obesity due to excess calories with serious comorbidity in adult (H)     Lab Results   Component Value Date    A1C 5.3 01/31/2024   A1c of 6.3%  [facility] on 3/27/24, 9/20/23 and 2/17/23.      Plan   We'll conitnue with non surgical, medicaiton supported weight loss this year to see if we can stabilize BMI under 30. If struggling to get under 35 BMI, and a year of sobriety w stable mental health then we'll get back in touch with Dr. Edgar for re-evaluation of clearance process.     Continue working with dietician. Set up visit this summer and  recheck with me on progress this Fall.       Compounded Tirzepatide through your PCP currently, ramping up to a dose between 5-7.5mg/week over the next couple of months would recommended if affordably covered. As your PCP ordered it from a compounding pharmacy I'm not familiar with, I cannot continue to order unless you desire it through the [facility].     Track intake with an capo like [employer]. I'd recommend aiming for 1400-1500kcal/day with 75-80grams of lean protein with your light exercise routines (bare minimum of 1250kcal/day with 70g of lean protein). Separate beverages from meals by 20 minutes and hydrate well between meals to hit intake goal of 80 oz/day of water.     Continue walks/swims and consider some back PT if capable.   Past Surgical History        Past Surgical History:   Procedure Laterality Date    ESOPHAGEAL BALLOON PROVOCATION STUDY N/A 9/14/2021    Procedure: Esophageal Balloon Provocation Study;  Surgeon: Jhon Chavez DO;  Location:  GI    TONSILLECTOMY ADULT Bilateral 11/13/2019    Procedure: Bilateral Tonsillectomy;  Surgeon: Evita Hampton MD;  Location:  OR    TURBINECTOMY Bilateral 11/13/2019    Procedure: Turbinectomy;  Surgeon: Evita Hampton MD;  Location:  OR    UVULOPALATOPHARYNGOPLASTY N/A 11/13/2019    Procedure: Uvulopalatopharyngoplasty, bilateral tonsilectomy and bilateral inferior turbinoplasty;  Surgeon: Evita Hampton MD;  Location:  OR       Family History, Social History   Reviewed as documented. See time and date confirmation.      Medications and Allergies      Current Outpatient Medications   Medication Sig Dispense Refill    albuterol (PROAIR HFA/PROVENTIL HFA/VENTOLIN HFA) 108 (90 Base) MCG/ACT inhaler Inhale 2 puffs into the lungs      ALPRAZolam (XANAX PO) Take 1 mg by mouth as needed for anxiety      amLODIPine (NORVASC) 5 MG tablet Take 5 mg by mouth daily      busPIRone (BUSPAR) 15 MG tablet        "celecoxib (CELEBREX) 100 MG capsule Take 100 mg by mouth      cephALEXin (KEFLEX) 500 MG capsule       desoximetasone (TOPICORT) 0.25 % OINT ointment Apply topically 2 times daily 100 g 6    disulfiram (ANTABUSE) 250 MG tablet Take 500 mg by mouth      doxycycline hyclate (VIBRA-TABS) 100 MG tablet       DULoxetine (CYMBALTA) 60 MG capsule Take 120 mg by mouth      hydrOXYzine HCl (ATARAX) 50 MG tablet Take 25-50 mg by mouth      LORazepam (ATIVAN) 1 MG tablet Take 1 tablets every 8 hours as needed with withdrawal symptoms      losartan (COZAAR) 100 MG tablet Take 100 mg by mouth daily      methocarbamol (ROBAXIN) 750 MG tablet       omeprazole (PRILOSEC) 40 MG DR capsule Take 1 capsule by mouth 2 times daily      ondansetron (ZOFRAN ODT) 4 MG ODT tab Place 1 tablet every 4 hours by translingual route as needed.      oxyCODONE (ROXICODONE) 5 MG tablet       prednisoLONE acetate (PRED FORTE) 1 % ophthalmic suspension       rosuvastatin (CRESTOR) 10 MG tablet TAKE 1 TABLET BY MOUTH ONCE DAILY WITH A MEAL      tirzepatide-Weight Management (ZEPBOUND) 2.5 MG/0.5ML prefilled pen Inject 2.5 mg subcutaneously      tiZANidine (ZANAFLEX) 2 MG tablet Take 2 mg by mouth      TRAZODONE HCL PO Take 100 mg by mouth At Bedtime       estradiol (ESTRACE) 0.1 MG/GM vaginal cream Place 1 g vaginally (Patient not taking: Reported on 10/29/2021)       Allergies: Diflucan [fluconazole] and Sulfa antibiotics    Habits   See above  Dietary History   Mild e  Physical Exam   Vitals: Ht 1.664 m (5' 5.51\")   Wt 103 kg (227 lb)   BMI 37.19 kg/m    Body mass index is 37.19 kg/m .  GENERAL: appears her stated age. Seated in parked car.  NECK:thick.    Normal speech/cognition.     Counseling     Reviewed use of medication support with diet. Importance of strengthening/exercise for metabolic health and spine support. Reviewed goals of mental/chemical health stability if considering future bariatric surgery.      35 minutes spent by me on the date " of the encounter doing chart review, history and exam, documentation and further activities per the note      Roldan Mills MD  [facility]  7/11/2024  7:55 AM

## 2024-07-11 NOTE — LETTER
"7/11/2024      Tessie Mcdaniels  [address] [facility]  [place] [address]      Dear Colleague,    Thank you for referring your patient, Tessie Mcdaniels, to the [facility]. Please see a copy of my visit note below.    Virtual Visit Details    Type of service:  Video Visit   Video Start Time: 11:01 AM  Video End Time:11:36 AM    Originating Location (pt. Location): Other [place]    Distant Location (provider location):  On-site  Platform used for Video Visit: [facility]    Outpatient Bariatric Medicine Progress Note-Structured Weight Loss   Indication: Medical bariatric therapy to review progress towards bariatric surgery.      Surgery:   TBD    Surgeon:  TBD    Impression     59 year old female with Body mass index is 37.19 kg/m . in the setting of morbid obesity which satisfies the NIH criteria for bariatric surgery. She is following up today after an introductory visit on 1/31/24 in our surgical program. She was deemed to be a poor candidate from a psychological readiness standpoint after her evaluation with our Bariatric Psychologist, Dr. Edgar with his note on 4/1/24 indicating that she had been ambivalent about proceeding towards surgery and had just recently become sober in December of 2023. His assessment for future progression to surgery was:  \"In order to be deemed a viable candidate for bariatric surgery, this patient will need to complete the following treatment recommendations: Continue individual psychotherapy to focus on improved coping mechanisms, maintain medication management to promote mood stability, maintain sobriety, attend support group meetings to improve knowledge of surgery and support around surgery, continue to see the bariatric dietitian to ensure appropriate nutrition and eating behaviors, maintain mindful eating behaviors and follow up with this clinician only after she is 100% certain she wants to proceed with surgery, she has maintain " "sobriety for a year and her therapist feels she is in a good position to proceed.\" .    Upon review today, she would like to proceed to surgery eventually but isn't committed yet and working on medication support at present for 2024 and we reviewed combined medication therapy with mindful diet.. She reports 16 lbs weight reduction this year, 6.6% total body weight reduction. Her Victoza has been stopped as she's ramped up Zepbound (compounded version at reduced dose that started 2 weeks ago at 1.5mg/week through her PCP). Encouraged ramping to 5-7.5mg/week depending on cost/affordability over the next 2 months.     Chart review shows dietician visit on 2/21/24. Will set up secondary visit.    Just met w/ her pain clinic.  Not interested in pain pump options.    Will walk a couple miles weekly and swimming regularly this summer 2x weekly. Water aerobics for about 30-60 minutes.   Comorbidities:  Patient Active Problem List   Diagnosis     Major depressive disorder, recurrent episode, severe (H)     NEERU (obstructive sleep apnea)     Obstructive sleep apnea     Gastroesophageal reflux disease without esophagitis     Globus sensation     Heartburn     Adjustment disorder with anxiety     History of tobacco use     BLAKE (acute kidney injury) (H24)     Alcohol use disorder, severe, dependence (H)     Anxiety     ASCUS of cervix with negative high risk HPV     Atrophic vaginitis     Chronic back pain     Eczema     History of alcoholism (H)     Hyperlipidemia     Hypocalcemia     Hypotension     Irritable bowel syndrome     Nodule of lower lobe of right lung     Opioid overdose (H)     Otalgia of both ears     Generalized anxiety disorder     Pre-diabetes     Class 2 severe obesity due to excess calories with serious comorbidity in adult (H)     Lab Results   Component Value Date    A1C 5.3 01/31/2024   A1c of 6.3%  [facility] on 3/27/24, 9/20/23 and 2/17/23.      Plan   We'll conitnue with non surgical, medicaiton supported " weight loss this year to see if we can stabilize BMI under 30. If struggling to get under 35 BMI, and a year of sobriety w stable mental health then we'll get back in touch with Dr. Edgar for re-evaluation of clearance process.     Continue working with dietician. Set up visit this summer and recheck with me on progress this Fall.       Compounded Tirzepatide through your PCP currently, ramping up to a dose between 5-7.5mg/week over the next couple of months would recommended if affordably covered. As your PCP ordered it from a compounding pharmacy I'm not familiar with, I cannot continue to order unless you desire it through the [facility] pharmacy.     Track intake with an capo like [employer]. I'd recommend aiming for 1400-1500kcal/day with 75-80grams of lean protein with your light exercise routines (bare minimum of 1250kcal/day with 70g of lean protein). Separate beverages from meals by 20 minutes and hydrate well between meals to hit intake goal of 80 oz/day of water.     Continue walks/swims and consider some back PT if capable.   Past Surgical History        Past Surgical History:   Procedure Laterality Date     ESOPHAGEAL BALLOON PROVOCATION STUDY N/A 9/14/2021    Procedure: Esophageal Balloon Provocation Study;  Surgeon: Jhon Chavez DO;  Location:  GI     TONSILLECTOMY ADULT Bilateral 11/13/2019    Procedure: Bilateral Tonsillectomy;  Surgeon: Evita Hampton MD;  Location:  OR     TURBINECTOMY Bilateral 11/13/2019    Procedure: Turbinectomy;  Surgeon: Evita Hampton MD;  Location:  OR     UVULOPALATOPHARYNGOPLASTY N/A 11/13/2019    Procedure: Uvulopalatopharyngoplasty, bilateral tonsilectomy and bilateral inferior turbinoplasty;  Surgeon: Evita Hampton MD;  Location:  OR       Family History, Social History   Reviewed as documented. See time and date confirmation.      Medications and Allergies      Current Outpatient Medications   Medication Sig Dispense  "Refill     albuterol (PROAIR HFA/PROVENTIL HFA/VENTOLIN HFA) 108 (90 Base) MCG/ACT inhaler Inhale 2 puffs into the lungs       ALPRAZolam (XANAX PO) Take 1 mg by mouth as needed for anxiety       amLODIPine (NORVASC) 5 MG tablet Take 5 mg by mouth daily       busPIRone (BUSPAR) 15 MG tablet        celecoxib (CELEBREX) 100 MG capsule Take 100 mg by mouth       cephALEXin (KEFLEX) 500 MG capsule        desoximetasone (TOPICORT) 0.25 % OINT ointment Apply topically 2 times daily 100 g 6     disulfiram (ANTABUSE) 250 MG tablet Take 500 mg by mouth       doxycycline hyclate (VIBRA-TABS) 100 MG tablet        DULoxetine (CYMBALTA) 60 MG capsule Take 120 mg by mouth       hydrOXYzine HCl (ATARAX) 50 MG tablet Take 25-50 mg by mouth       LORazepam (ATIVAN) 1 MG tablet Take 1 tablets every 8 hours as needed with withdrawal symptoms       losartan (COZAAR) 100 MG tablet Take 100 mg by mouth daily       methocarbamol (ROBAXIN) 750 MG tablet        omeprazole (PRILOSEC) 40 MG DR capsule Take 1 capsule by mouth 2 times daily       ondansetron (ZOFRAN ODT) 4 MG ODT tab Place 1 tablet every 4 hours by translingual route as needed.       oxyCODONE (ROXICODONE) 5 MG tablet        prednisoLONE acetate (PRED FORTE) 1 % ophthalmic suspension        rosuvastatin (CRESTOR) 10 MG tablet TAKE 1 TABLET BY MOUTH ONCE DAILY WITH A MEAL       tirzepatide-Weight Management (ZEPBOUND) 2.5 MG/0.5ML prefilled pen Inject 2.5 mg subcutaneously       tiZANidine (ZANAFLEX) 2 MG tablet Take 2 mg by mouth       TRAZODONE HCL PO Take 100 mg by mouth At Bedtime        estradiol (ESTRACE) 0.1 MG/GM vaginal cream Place 1 g vaginally (Patient not taking: Reported on 10/29/2021)       Allergies: Diflucan [fluconazole] and Sulfa antibiotics    Habits   See above  Dietary History   Mild e  Physical Exam   Vitals: Ht 1.664 m (5' 5.51\")   Wt 103 kg (227 lb)   BMI 37.19 kg/m    Body mass index is 37.19 kg/m .  GENERAL: appears her stated age. Seated in parked " car.  NECK:thick.    Normal speech/cognition.     Counseling     Reviewed use of medication support with diet. Importance of strengthening/exercise for metabolic health and spine support. Reviewed goals of mental/chemical health stability if considering future bariatric surgery.      35 minutes spent by me on the date of the encounter doing chart review, history and exam, documentation and further activities per the note      Roldan Mills MD  [facility]  7/11/2024  7:55 AM      Again, thank you for allowing me to participate in the care of your patient.        Sincerely,        Roldan Mills MD

## 2024-07-11 NOTE — NURSING NOTE
Is the patient currently in the state of [place]? YES    Visit mode:VIDEO    If the visit is dropped, the patient can be reconnected by: VIDEO VISIT: Text to cell phone:   Telephone Information:   Mobile [phone]       Will anyone else be joining the visit? NO  (If patient encounters technical issues they should call [phone]    How would you like to obtain your AVS? MyChart    Are changes needed to the allergy or medication list? No, Pt stated no changes to allergies, and Pt stated no med changes    Are refills needed on medications prescribed by this physician? NO    Reason for visit: RECHECK ([facility])    Vera Aden VVF    Pt stated currently taking zepbound, stated she selected victoza on questionnaire because zepbound isn't listed.

## 2024-09-26 ENCOUNTER — TELEPHONE (OUTPATIENT)
Dept: DERMATOLOGY | Facility: CLINIC | Age: 59
End: 2024-09-26
Payer: COMMERCIAL

## 2024-09-26 DIAGNOSIS — I87.2 STASIS DERMATITIS OF BOTH LEGS: ICD-10-CM

## 2024-09-26 DIAGNOSIS — L30.4 INTERTRIGO: ICD-10-CM

## 2024-09-26 RX ORDER — DESOXIMETASONE 2.5 MG/G
OINTMENT TOPICAL 2 TIMES DAILY
Qty: 100 G | Refills: 1 | Status: SHIPPED | OUTPATIENT
Start: 2024-09-26

## 2024-09-26 NOTE — TELEPHONE ENCOUNTER
[facility] Call Center    Phone Message    May a detailed message be left on voicemail: yes     Reason for Call: Medication Refill Request    Has the patient contacted the pharmacy for the refill? Yes   Name of medication being requested: desoximetasone (TOPICORT) 0.25 % OINT ointment  Provider who prescribed the medication: Didi  Pharmacy:   [facility]. [address]     Date medication is needed: Now      Action Taken: Other: [place] [facility]    Travel Screening: Not Applicable     Date of Service:

## 2024-10-23 ENCOUNTER — TELEPHONE (OUTPATIENT)
Dept: DERMATOLOGY | Facility: CLINIC | Age: 59
End: 2024-10-23
Payer: COMMERCIAL

## 2024-10-23 NOTE — TELEPHONE ENCOUNTER
Patient Contact    Attempt # 1    Was call answered?  No.    Called patient. No answer. Left message to call back. Clinic number was provided.     Nani Oleary RN    [facility]   [phone]

## 2024-10-23 NOTE — TELEPHONE ENCOUNTER
[facility] Call Center    Phone Message    May a detailed message be left on voicemail: yes     Reason for Call: Other: Pt is calling to see if she can be seen sooner than her 1/30 appt due to her having an eczema flare up, please call back thanks!     Action Taken: Other: [place] [facility]    Travel Screening: Not Applicable     Date of Service:

## 2024-10-24 ENCOUNTER — TELEPHONE (OUTPATIENT)
Dept: DERMATOLOGY | Facility: CLINIC | Age: 59
End: 2024-10-24
Payer: COMMERCIAL

## 2024-10-24 DIAGNOSIS — L30.9 DERMATITIS: Primary | ICD-10-CM

## 2024-10-24 DIAGNOSIS — I87.2 STASIS DERMATITIS OF BOTH LEGS: ICD-10-CM

## 2024-10-24 RX ORDER — PREDNISONE 10 MG/1
TABLET ORAL
Qty: 60 TABLET | Refills: 0 | Status: CANCELLED | OUTPATIENT
Start: 2024-10-24

## 2024-10-24 RX ORDER — PREDNISONE 10 MG/1
TABLET ORAL
Qty: 24 TABLET | Refills: 0 | Status: SHIPPED | OUTPATIENT
Start: 2024-10-24

## 2024-10-24 NOTE — TELEPHONE ENCOUNTER
Spoke to patient and she accepted a work in appt next week with Dr. Tolbert.     Wants Prednisone refilled-see separate encounter sent to ASIF Oleary RN

## 2024-10-24 NOTE — TELEPHONE ENCOUNTER
Ok will send taper, follow-up with dr. Tolbert as planned.   Please take photos so if clear during office visit next week Dr. Tolbert can see how it looked prior.

## 2024-10-24 NOTE — TELEPHONE ENCOUNTER
Spoke to patient who is asking if Prednisone can be refilled today?     She accepted a work in appt with Dr. Tolbert next week.     (We called her yesterday, but did not hear back for pt)    She has hx of stasis dermatitis per chart review.     Nani Oleary RN

## 2024-10-24 NOTE — TELEPHONE ENCOUNTER
[facility] Call Center    Phone Message    May a detailed message be left on voicemail: yes     Reason for Call: Medication Refill Request    Has the patient contacted the pharmacy for the refill? Yes   Name of medication being requested: Prednisone   Provider who prescribed the medication: Dr. Tolbert  Pharmacy: [facility]  Date medication is needed: asap     Pharmacy is specifically reaching out regarding Dr. Tolbert to refill this medication, I don't see it noted in the pt chart    Action Taken: Other: TE to [facility]    Travel Screening: Not Applicable     Date of Service:

## 2024-10-30 ENCOUNTER — OFFICE VISIT (OUTPATIENT)
Dept: DERMATOLOGY | Facility: CLINIC | Age: 59
End: 2024-10-30
Payer: COMMERCIAL

## 2024-10-30 DIAGNOSIS — I87.2 STASIS DERMATITIS OF BOTH LEGS: Primary | ICD-10-CM

## 2024-10-30 PROCEDURE — 99213 OFFICE O/P EST LOW 20 MIN: CPT | Performed by: DERMATOLOGY

## 2024-10-30 RX ORDER — BETAMETHASONE DIPROPIONATE 0.5 MG/G
CREAM TOPICAL
Qty: 300 G | Refills: 3 | Status: SHIPPED | OUTPATIENT
Start: 2024-10-30

## 2024-10-30 ASSESSMENT — PAIN SCALES - GENERAL: PAINLEVEL_OUTOF10: NO PAIN (0)

## 2024-10-30 NOTE — NURSING NOTE
Tessie Mcdaniels's chief complaint for this visit includes:  Chief Complaint   Patient presents with    Derm Problem     Patient is irritated on bilateral shin and has increase itching.      PCP: Jammie Ramos    Referring Provider:  Referred Self, MD  No address on file    There were no vitals taken for this visit.  No Pain (0)        Allergies   Allergen Reactions    Diflucan [Fluconazole]      Mouth sores    Sulfa Antibiotics Nausea and Vomiting, Nausea and GI Disturbance         Do you need any medication refills at today's visit? No    Rox Donis MA

## 2024-10-30 NOTE — PROGRESS NOTES
Tessie Mcdaniels is an extremely pleasant 59 year old year old female patient here today for follow stasis derm, legs have broken out again.  She is not wearing compression hose.  Patient has no other skin complaints today.  Remainder of the HPI, Meds, PMH, Allergies, FH, and SH was reviewed in chart.      Past Medical History:   Diagnosis Date    Fatty liver     Gastroesophageal reflux disease with esophagitis     HTN (hypertension)     Hyperlipidemia     on statin therapy    PONV (postoperative nausea and vomiting)     Sleep apnea     on cpap       Past Surgical History:   Procedure Laterality Date    ESOPHAGEAL BALLOON PROVOCATION STUDY N/A 9/14/2021    Procedure: Esophageal Balloon Provocation Study;  Surgeon: Jhon Chavez DO;  Location: [facility]    TONSILLECTOMY ADULT Bilateral 11/13/2019    Procedure: Bilateral Tonsillectomy;  Surgeon: Evita Hampton MD;  [facility]    TURBINECTOMY Bilateral 11/13/2019    Procedure: Turbinectomy;  Surgeon: Evita Hampton MD;  [facility]    UVULOPALATOPHARYNGOPLASTY N/A 11/13/2019    Procedure: Uvulopalatopharyngoplasty, bilateral tonsilectomy and bilateral inferior turbinoplasty;  Surgeon: Evita Hampton MD;  Location: [facility]        Family History   Problem Relation Age of Onset    Kidney Disease Mother     Thyroid Disease Mother     Lung Cancer Father     Emphysema Father        Social History     Socioeconomic History    Marital status:      Spouse name: Not on file    Number of children: Not on file    Years of education: Not on file    Highest education level: Not on file   Occupational History    Not on file   Tobacco Use    Smoking status: Former     Current packs/day: 0.00     Average packs/day: 0.1 packs/day for 10.0 years (1.0 ttl pk-yrs)     Types: Cigarettes     Start date: 2004     Quit date: 2014     Years since quitting: 10.8    Smokeless tobacco: Never   Vaping Use    Vaping status: Never Used   Substance and  Sexual Activity    Alcohol use: Not Currently     Comment: occationally    Drug use: Never    Sexual activity: Yes     Birth control/protection: Post-menopausal   Other Topics Concern    Parent/sibling w/ CABG, MI or angioplasty before 65F 55M? Not Asked   Social History Narrative    Not on file     Social Drivers of Health     Financial Resource Strain: High Risk (1/9/2024)    [facility], [facility]    Overall Financial Resource Strain (CARDIA)     How hard is it for you to pay for the very basics like food, housing, medical care, and heating?: Hard   Food Insecurity: No Food Insecurity (1/9/2024)    Received from [facility], [facility]    Hunger Vital Sign     Worried About Running Out of Food in the Last Year: Never true     Ran Out of Food in the Last Year: Never true   Transportation Needs: No Transportation Needs (1/9/2024)    Received from [facility], [facility]    PRAPARE - Transportation     In the past 12 months, has lack of transportation kept you from medical appointments or from getting medications?: No     In the past 12 months, has lack of transportation kept you from meetings, work, or from getting things needed for daily living?: No   Physical Activity: Insufficiently Active (1/9/2024)    Received from [facility], [facility]    Exercise Vital Sign     Days of Exercise per Week: 3 days     Minutes of Exercise per Session: 30 min   Stress: Stress Concern Present (1/9/2024)    Received from [facility], [facility]    [place] of Occupational Health - Occupational Stress Questionnaire     Do you feel stress - tense, restless, nervous, or anxious, or unable to sleep at night because your mind is troubled all the time  - these days?: Rather much   Social Connections: Socially Isolated (1/9/2024)    Received from [facility], [facility]    Social Connection and Isolation Panel [NHANES]     Frequency of Communication with Friends and Family: Three times a week     Frequency of Social Gatherings with Friends and Family: Once a week     Attends Mandaeism Services: Never     Active Member of Clubs or Organizations: No     Attends Club or Organization Meetings: Patient declined     Marital Status:    Interpersonal Safety: Not At Risk (1/9/2024)    Received from [facility], [facility]    Humiliation, Afraid, Rape, and Kick questionnaire     Fear of Current or Ex-Partner: No     Emotionally Abused: No     Physically Abused: No     Sexually Abused: No   Housing Stability: Low Risk  (1/9/2024)    Received from [facility]    Housing Stability Vital Sign     Unable to Pay for Housing in the Last Year: No     In the last 12 months, how many places have you lived?: 2     In the last 12 months, was there a time when you did not have a steady place to sleep or slept in a shelter (including now)?: No       Outpatient Encounter Medications as of 10/30/2024   Medication Sig Dispense Refill    albuterol (PROAIR HFA/PROVENTIL HFA/VENTOLIN HFA) 108 (90 Base) MCG/ACT inhaler Inhale 2 puffs into the lungs      ALPRAZolam (XANAX PO) Take 1 mg by mouth as needed for anxiety      amLODIPine (NORVASC) 5 MG tablet Take 5 mg by mouth daily      busPIRone (BUSPAR) 15 MG tablet       celecoxib (CELEBREX) 100 MG capsule Take 100 mg by mouth      cephALEXin (KEFLEX) 500 MG capsule       desoximetasone (TOPICORT) 0.25 % OINT ointment Apply topically 2 times daily. 100 g 1    disulfiram (ANTABUSE) 250 MG tablet Take 500 mg by mouth      doxycycline hyclate (VIBRA-TABS) 100 MG tablet       DULoxetine (CYMBALTA) 60 MG capsule  Take 120 mg by mouth      estradiol (ESTRACE) 0.1 MG/GM vaginal cream Place vaginally.      hydrOXYzine HCl (ATARAX) 50 MG tablet Take 25-50 mg by mouth      LORazepam (ATIVAN) 1 MG tablet Take 1 tablets every 8 hours as needed with withdrawal symptoms      losartan (COZAAR) 100 MG tablet Take 100 mg by mouth daily      methocarbamol (ROBAXIN) 750 MG tablet       omeprazole (PRILOSEC) 40 MG DR capsule Take 1 capsule by mouth 2 times daily      ondansetron (ZOFRAN ODT) 4 MG ODT tab Place 1 tablet every 4 hours by translingual route as needed.      oxyCODONE (ROXICODONE) 5 MG tablet       prednisoLONE acetate (PRED FORTE) 1 % ophthalmic suspension       predniSONE (DELTASONE) 10 MG tablet Take 3 tablets daily for 4 day, 2 tablets daily for 4 days, 1 tablet daily for 4 days. 24 tablet 0    rosuvastatin (CRESTOR) 10 MG tablet TAKE 1 TABLET BY MOUTH ONCE DAILY WITH A MEAL      tirzepatide-Weight Management (ZEPBOUND) 2.5 MG/0.5ML prefilled pen Inject 2.5 mg subcutaneously      tiZANidine (ZANAFLEX) 2 MG tablet Take 2 mg by mouth      TRAZODONE HCL PO Take 100 mg by mouth At Bedtime        No facility-administered encounter medications on file as of 10/30/2024.             O:   NAD, WDWN, Alert & Oriented, Mood & Affect wnl, Vitals stable   General appearance normal   Vitals stable   Alert, oriented and in no acute distress     1+ edema with stasis derm on legs L >R      Eyes: Conjunctivae/lids:Normal     ENT: Lips, mucosa: normal    MSK:Normal    Cardiovascular: peripheral edema none    Pulm: Breathing Normal    Neuro/Psych: Orientation:Alert and Orientedx3 ; Mood/Affect:normal       A/P:  Stasis derm   To reduce swelling in the leg:  Don't stand for long periods.   Take regular walks.   Elevate your feet when sitting: if your legs are swollen they need to be above your hips to drain effectively.   Elevate the foot of your bed overnight.   Once the dermatitis is under control, wear special graduated compression stockings  long term.    To treat the dermatitis:   Dry up oozing patches with dilute vinegar on gauze as compresses.   Topical betamethasone  cream daily  Return to clinic in 2 months . (Stop at desk for appointment)  Use vanicream daily.  Try not to scratch: it keeps the dermatitis going.   Protect your skin from injury: this can result in infection or ulceration.  Vinegar is 5% acetic acid. Make a 1% solution by adding   cup of vinegar (white or brown) to 1 pint of water.   It was a pleasure speaking to Tessie Mcdaniels today.  Previous clinic notes and pertinent laboratory tests were reviewed prior to Tessie Mcdaniels's visit.  Skin care regimen reviewed with patient: Eliminate harsh soaps, i.e. Dial, zest, irsih spring; Mild soaps such as Cetaphil or Dove sensitive skin, avoid hot or cold showers, aggressive use of emollients including vanicream, cetaphil or cerave discussed with patient.    Return to clinic 2 months

## 2024-10-30 NOTE — LETTER
10/30/2024      Tessie Mcdaniels  [address]  [place] [address]      Dear Colleague,    Thank you for referring your patient, Tessie Mcdaniels, to the [facility]. Please see a copy of my visit note below.    Tessie Mcdaniels is an extremely pleasant 59 year old year old female patient here today for follow stasis derm, legs have broken out again.  She is not wearing compression hose.  Patient has no other skin complaints today.  Remainder of the HPI, Meds, PMH, Allergies, FH, and SH was reviewed in chart.      Past Medical History:   Diagnosis Date     Fatty liver      Gastroesophageal reflux disease with esophagitis      HTN (hypertension)      Hyperlipidemia     on statin therapy     PONV (postoperative nausea and vomiting)      Sleep apnea     on cpap       Past Surgical History:   Procedure Laterality Date     ESOPHAGEAL BALLOON PROVOCATION STUDY N/A 9/14/2021    Procedure: Esophageal Balloon Provocation Study;  Surgeon: Jhon Chavez DO;  Location: [facility]     TONSILLECTOMY ADULT Bilateral 11/13/2019    Procedure: Bilateral Tonsillectomy;  Surgeon: Evita Hampton MD;  [facility]     TURBINECTOMY Bilateral 11/13/2019    Procedure: Turbinectomy;  Surgeon: Evita Hampton MD;  [facility]     UVULOPALATOPHARYNGOPLASTY N/A 11/13/2019    Procedure: Uvulopalatopharyngoplasty, bilateral tonsilectomy and bilateral inferior turbinoplasty;  Surgeon: Evita Hampton MD;  [facility]        Family History   Problem Relation Age of Onset     Kidney Disease Mother      Thyroid Disease Mother      Lung Cancer Father      Emphysema Father        Social History     Socioeconomic History     Marital status:      Spouse name: Not on file     Number of children: Not on file     Years of education: Not on file     Highest education level: Not on file   Occupational History     Not on file   Tobacco Use     Smoking status: Former     Current  packs/day: 0.00     Average packs/day: 0.1 packs/day for 10.0 years (1.0 ttl pk-yrs)     Types: Cigarettes     Start date: 2004     Quit date: 2014     Years since quitting: 10.8     Smokeless tobacco: Never   Vaping Use     Vaping status: Never Used   Substance and Sexual Activity     Alcohol use: Not Currently     Comment: occationally     Drug use: Never     Sexual activity: Yes     Birth control/protection: Post-menopausal   Other Topics Concern     Parent/sibling w/ CABG, MI or angioplasty before 65F 55M? Not Asked   Social History Narrative     Not on file     Social Drivers of Health     Financial Resource Strain: High Risk (1/9/2024)    Received from [facility], [facility]    Overall Financial Resource Strain (CARDIA)      How hard is it for you to pay for the very basics like food, housing, medical care, and heating?: Hard   Food Insecurity: No Food Insecurity (1/9/2024)    Received from [facility], [facility]    Hunger Vital Sign      Worried About Running Out of Food in the Last Year: Never true      Ran Out of Food in the Last Year: Never true   Transportation Needs: No Transportation Needs (1/9/2024)    Received from [facility], [facility]    PRAPARE - Transportation      In the past 12 months, has lack of transportation kept you from medical appointments or from getting medications?: No      In the past 12 months, has lack of transportation kept you from meetings, work, or from getting things needed for daily living?: No   Physical Activity: Insufficiently Active (1/9/2024)    Received from [facility], [facility]    Exercise Vital Sign      Days of Exercise per Week: 3 days      Minutes of Exercise per Session: 30 min   Stress: Stress Concern Present (1/9/2024)    Received from [facility]  [facility], [facility]    Citizen of the [place] of Occupational Health - Occupational Stress Questionnaire      Do you feel stress - tense, restless, nervous, or anxious, or unable to sleep at night because your mind is troubled all the time - these days?: Rather much   Social Connections: Socially Isolated (1/9/2024)    Received from [facility], [facility]    Social Connection and Isolation Panel [NHANES]      Frequency of Communication with Friends and Family: Three times a week      Frequency of Social Gatherings with Friends and Family: Once a week      Attends Latter-day Services: Never      Active Member of Clubs or Organizations: No      Attends Club or Organization Meetings: Patient declined      Marital Status:    Interpersonal Safety: Not At Risk (1/9/2024)    Received from [facility], [facility]    Humiliation, Afraid, Rape, and Kick questionnaire      Fear of Current or Ex-Partner: No      Emotionally Abused: No      Physically Abused: No      Sexually Abused: No   Housing Stability: Low Risk  (1/9/2024)    Received from [facility]    Housing Stability Vital Sign      Unable to Pay for Housing in the Last Year: No      In the last 12 months, how many places have you lived?: 2      In the last 12 months, was there a time when you did not have a steady place to sleep or slept in a shelter (including now)?: No       Outpatient Encounter Medications as of 10/30/2024   Medication Sig Dispense Refill     albuterol (PROAIR HFA/PROVENTIL HFA/VENTOLIN HFA) 108 (90 Base) MCG/ACT inhaler Inhale 2 puffs into the lungs       ALPRAZolam (XANAX PO) Take 1 mg by mouth as needed for anxiety       amLODIPine (NORVASC) 5 MG tablet Take 5 mg by mouth daily       busPIRone (BUSPAR) 15 MG tablet        celecoxib (CELEBREX) 100 MG capsule Take  100 mg by mouth       cephALEXin (KEFLEX) 500 MG capsule        desoximetasone (TOPICORT) 0.25 % OINT ointment Apply topically 2 times daily. 100 g 1     disulfiram (ANTABUSE) 250 MG tablet Take 500 mg by mouth       doxycycline hyclate (VIBRA-TABS) 100 MG tablet        DULoxetine (CYMBALTA) 60 MG capsule Take 120 mg by mouth       estradiol (ESTRACE) 0.1 MG/GM vaginal cream Place vaginally.       hydrOXYzine HCl (ATARAX) 50 MG tablet Take 25-50 mg by mouth       LORazepam (ATIVAN) 1 MG tablet Take 1 tablets every 8 hours as needed with withdrawal symptoms       losartan (COZAAR) 100 MG tablet Take 100 mg by mouth daily       methocarbamol (ROBAXIN) 750 MG tablet        omeprazole (PRILOSEC) 40 MG DR capsule Take 1 capsule by mouth 2 times daily       ondansetron (ZOFRAN ODT) 4 MG ODT tab Place 1 tablet every 4 hours by translingual route as needed.       oxyCODONE (ROXICODONE) 5 MG tablet        prednisoLONE acetate (PRED FORTE) 1 % ophthalmic suspension        predniSONE (DELTASONE) 10 MG tablet Take 3 tablets daily for 4 day, 2 tablets daily for 4 days, 1 tablet daily for 4 days. 24 tablet 0     rosuvastatin (CRESTOR) 10 MG tablet TAKE 1 TABLET BY MOUTH ONCE DAILY WITH A MEAL       tirzepatide-Weight Management (ZEPBOUND) 2.5 MG/0.5ML prefilled pen Inject 2.5 mg subcutaneously       tiZANidine (ZANAFLEX) 2 MG tablet Take 2 mg by mouth       TRAZODONE HCL PO Take 100 mg by mouth At Bedtime        No facility-administered encounter medications on file as of 10/30/2024.             O:   NAD, WDWN, Alert & Oriented, Mood & Affect wnl, Vitals stable   General appearance normal   Vitals stable   Alert, oriented and in no acute distress     1+ edema with stasis derm on legs L >R      Eyes: Conjunctivae/lids:Normal     ENT: Lips, mucosa: normal    MSK:Normal    Cardiovascular: peripheral edema none    Pulm: Breathing Normal    Neuro/Psych: Orientation:Alert and Orientedx3 ; Mood/Affect:normal       A/P:  Stasis derm   To  reduce swelling in the leg:  Don't stand for long periods.   Take regular walks.   Elevate your feet when sitting: if your legs are swollen they need to be above your hips to drain effectively.   Elevate the foot of your bed overnight.   Once the dermatitis is under control, wear special graduated compression stockings long term.    To treat the dermatitis:   Dry up oozing patches with dilute vinegar on gauze as compresses.   Topical betamethasone  cream daily  Return to clinic in 2 months . (Stop at desk for appointment)  Use vanicream daily.  Try not to scratch: it keeps the dermatitis going.   Protect your skin from injury: this can result in infection or ulceration.  Vinegar is 5% acetic acid. Make a 1% solution by adding   cup of vinegar (white or brown) to 1 pint of water.   It was a pleasure speaking to Tessie Mcdaniels today.  Previous clinic notes and pertinent laboratory tests were reviewed prior to Tessie Mcdaniels's visit.  Skin care regimen reviewed with patient: Eliminate harsh soaps, i.e. Dial, zest, irsih spring; Mild soaps such as Cetaphil or Dove sensitive skin, avoid hot or cold showers, aggressive use of emollients including vanicream, cetaphil or cerave discussed with patient.    Return to clinic 2 months      Again, thank you for allowing me to participate in the care of your patient.        Sincerely,        Ash Tolbert MD

## 2024-10-30 NOTE — PATIENT INSTRUCTIONS
To reduce swelling in the leg:  Don't stand for long periods.   Take regular walks.   Elevate your feet when sitting: if your legs are swollen they need to be above your hips to drain effectively.   Elevate the foot of your bed overnight.   Once the dermatitis is under control, wear special graduated compression stockings long term.    To treat the dermatitis:   Dry up oozing patches with dilute vinegar on gauze as compresses.   Topical Triamcinolone cream daily  Return to clinic in 2 weeks. (Stop at desk for appointment)  Use vanicream daily.  Try not to scratch: it keeps the dermatitis going.   Protect your skin from injury: this can result in infection or ulceration.  Vinegar is 5% acetic acid. Make a 1% solution by adding   cup of vinegar (white or brown) to 1 pint of water.

## 2024-11-16 ENCOUNTER — HEALTH MAINTENANCE LETTER (OUTPATIENT)
Age: 59
End: 2024-11-16

## 2024-12-03 PROBLEM — K42.9 UMBILICAL HERNIA WITHOUT OBSTRUCTION AND WITHOUT GANGRENE: Status: ACTIVE | Noted: 2024-07-11

## 2024-12-05 ENCOUNTER — VIRTUAL VISIT (OUTPATIENT)
Dept: SURGERY | Facility: CLINIC | Age: 59
End: 2024-12-05
Attending: EMERGENCY MEDICINE
Payer: COMMERCIAL

## 2024-12-05 VITALS — BODY MASS INDEX: 36.16 KG/M2 | WEIGHT: 225 LBS | HEIGHT: 66 IN

## 2024-12-05 DIAGNOSIS — E66.01 CLASS 2 SEVERE OBESITY DUE TO EXCESS CALORIES WITH SERIOUS COMORBIDITY AND BODY MASS INDEX (BMI) OF 37.0 TO 37.9 IN ADULT (H): ICD-10-CM

## 2024-12-05 DIAGNOSIS — M54.50 CHRONIC LOW BACK PAIN, UNSPECIFIED BACK PAIN LATERALITY, UNSPECIFIED WHETHER SCIATICA PRESENT: ICD-10-CM

## 2024-12-05 DIAGNOSIS — K76.9 LIVER DISEASE, UNSPECIFIED: Primary | ICD-10-CM

## 2024-12-05 DIAGNOSIS — E66.812 CLASS 2 SEVERE OBESITY DUE TO EXCESS CALORIES WITH SERIOUS COMORBIDITY AND BODY MASS INDEX (BMI) OF 37.0 TO 37.9 IN ADULT (H): ICD-10-CM

## 2024-12-05 DIAGNOSIS — F10.11 HISTORY OF ETOH ABUSE: ICD-10-CM

## 2024-12-05 DIAGNOSIS — G47.33 OSA (OBSTRUCTIVE SLEEP APNEA): ICD-10-CM

## 2024-12-05 DIAGNOSIS — G89.29 CHRONIC LOW BACK PAIN, UNSPECIFIED BACK PAIN LATERALITY, UNSPECIFIED WHETHER SCIATICA PRESENT: ICD-10-CM

## 2024-12-05 NOTE — NURSING NOTE
Current patient location:  [address] [place] [address] [place]    Is the patient currently in the state of [place]? YES    Visit mode:VIDEO    If the visit is dropped, the patient can be reconnected by: VIDEO VISIT: Send to e-mail at: [email or web address]    Will anyone else be joining the visit? NO  (If patient encounters technical issues they should call [phone]    Are changes needed to the allergy or medication list? Pt stated no changes to allergies and Pt stated no med changes    Are refills needed on medications prescribed by this physician? NO    Reason for visit: RECHECK    Melva RASMUSSEN

## 2024-12-05 NOTE — PROGRESS NOTES
Virtual Visit Details    Type of service:  Video Visit   Video Start Time:  10:55am  Video End Time:11:26 AM    Originating Location (pt. Location): Home    Distant Location (provider location):  On-site  Platform used for Video Visit: Tish

## 2024-12-05 NOTE — PATIENT INSTRUCTIONS
Plan:   1.  Diet: to nourish this phase of weight loss, we'll need to hit about 1475-1550kcal/day with 85 grams of lean protein daily and 80-90 oz of water daily to feel your best and protect metabolic drive/muscle mass and immune health.     2. Exercise: gentle stretching/walking as back/hernia repairs allow for. Continue PT exercises.  3. Medication: currently about to ramp up Tirzepatide from your PCP's compounding pharmacy. This supply may dry up as tirzepatide is no longer on FDA drug shortage list. If interested in trial of compounded semaglutide let us know.  4. Track intake the next month to feel what about 1400-1500kca/day feels like as you raise your protein awareness. Start meals with protein portion first, then migrate around the plate. Separate beverages from meals to make sure you get enough food at each meal to keep hunger well controlled.   5. Goals: 1-2lbs/week of weight loss should be a good goal.   6. 2025 will be a non surgical focus in light of recent trouble with alcohol. Goal of stabilize BMI under 30 this year.

## 2024-12-05 NOTE — PROGRESS NOTES
"Bariatric Clinic Follow-Up Visit:    Tessie Mcdaniels is a 59 year old  female with Body mass index is 36.86 kg/m .  presenting here today for follow-up on non-surgical efforts for weight loss.  She is following up today after an introductory visit on 1/31/24 in our surgical program. She was deemed to be a poor candidate from a psychological readiness standpoint after her evaluation with our Bariatric Psychologist, Dr. Edgar with his note on 4/1/24 indicating that she had been ambivalent about proceeding towards surgery and had just recently become sober in December of 2023. His assessment for future progression to surgery was:  \"In order to be deemed a viable candidate for bariatric surgery, this patient will need to complete the following treatment recommendations: Continue individual psychotherapy to focus on improved coping mechanisms, maintain medication management to promote mood stability, maintain sobriety, attend support group meetings to improve knowledge of surgery and support around surgery, continue to see the bariatric dietitian to ensure appropriate nutrition and eating behaviors, maintain mindful eating behaviors and follow up with this clinician only after she is 100% certain she wants to proceed with surgery, she has maintain sobriety for a year and her therapist feels she is in a good position to proceed.\" .     Upon review today, she would like to proceed to surgery eventually but isn't committed yet and working on medication support at present for 2024 and we reviewed combined medication therapy with mindful diet.. She reports 16 lbs weight reduction this year, 6.6% total body weight reduction. Her Victoza had been stopped as she ramped up Zepbound (compounded version through her PCP). Tirzepatide removed from FDA shortage list in October of 2024.  As of our 12/5/24 visit she has a vial that she's not started, with plans to ramp up to the 7.5mg/week dose but may be interested in transition to " "semaglutide for some cost savings.  She'll reach out if that's the case. She's had her tirzepatide interrupted by both alcohol abuse related hospital stay in August and then a hernia repair and nerve ablation surgery in the late Fall of 2024. She's not a candidate for bariatric surgery at this point due to recent alcohol abuse issues and we'll continue to work non surgically in 2025. She's had long periods of sobriety in her life before and \"feels like this time will\" stick.    Weight:   Wt Readings from Last 5 Encounters:   12/05/24 102.1 kg (225 lb)   07/11/24 103 kg (227 lb)   01/31/24 110 kg (242 lb 8 oz)   09/14/21 97.3 kg (214 lb 8.1 oz)   04/29/21 86.2 kg (190 lb)    pounds    Liver Function Studies -   Recent Labs   Lab Test 01/31/24  1512   PROTTOTAL 8.1   ALBUMIN 4.4   BILITOTAL 0.2   ALKPHOS 118   AST 49*   ALT 68*       Comorbidities:  Patient Active Problem List   Diagnosis    Major depressive disorder, recurrent episode, severe (H)    NEERU (obstructive sleep apnea)    Obstructive sleep apnea    Gastroesophageal reflux disease without esophagitis    Globus sensation    Heartburn    Adjustment disorder with anxiety    History of tobacco use    BLAKE (acute kidney injury) (H)    Alcohol use disorder, severe, dependence (H)    Anxiety    ASCUS of cervix with negative high risk HPV    Atrophic vaginitis    Chronic back pain    Eczema    History of alcoholism (H)    Hyperlipidemia    Hypocalcemia    Hypotension    Irritable bowel syndrome    Nodule of lower lobe of right lung    Opioid overdose (H)    Otalgia of both ears    Generalized anxiety disorder    Pre-diabetes    Class 2 severe obesity due to excess calories with serious comorbidity in adult (H)    Umbilical hernia without obstruction and without gangrene       Current Outpatient Medications:     albuterol (PROAIR HFA/PROVENTIL HFA/VENTOLIN HFA) 108 (90 Base) MCG/ACT inhaler, Inhale 2 puffs into the lungs, Disp: , Rfl:     ALPRAZolam (XANAX PO), Take 1 " mg by mouth as needed for anxiety, Disp: , Rfl:     amLODIPine (NORVASC) 5 MG tablet, Take 5 mg by mouth daily, Disp: , Rfl:     augmented betamethasone dipropionate (DIPROLENE AF) 0.05 % external cream, Apply sparingly to affected area twice daily as needed.  Do not apply to face., Disp: 300 g, Rfl: 3    busPIRone (BUSPAR) 15 MG tablet, , Disp: , Rfl:     celecoxib (CELEBREX) 100 MG capsule, Take 100 mg by mouth, Disp: , Rfl:     cephALEXin (KEFLEX) 500 MG capsule, , Disp: , Rfl:     desoximetasone (TOPICORT) 0.25 % OINT ointment, Apply topically 2 times daily., Disp: 100 g, Rfl: 1    disulfiram (ANTABUSE) 250 MG tablet, Take 500 mg by mouth, Disp: , Rfl:     doxycycline hyclate (VIBRA-TABS) 100 MG tablet, , Disp: , Rfl:     DULoxetine (CYMBALTA) 60 MG capsule, Take 120 mg by mouth, Disp: , Rfl:     estradiol (ESTRACE) 0.1 MG/GM vaginal cream, Place vaginally., Disp: , Rfl:     hydrOXYzine HCl (ATARAX) 50 MG tablet, Take 25-50 mg by mouth, Disp: , Rfl:     LORazepam (ATIVAN) 1 MG tablet, Take 1 tablets every 8 hours as needed with withdrawal symptoms, Disp: , Rfl:     losartan (COZAAR) 100 MG tablet, Take 100 mg by mouth daily, Disp: , Rfl:     methocarbamol (ROBAXIN) 750 MG tablet, , Disp: , Rfl:     omeprazole (PRILOSEC) 40 MG DR capsule, Take 1 capsule by mouth 2 times daily, Disp: , Rfl:     ondansetron (ZOFRAN ODT) 4 MG ODT tab, Place 1 tablet every 4 hours by translingual route as needed., Disp: , Rfl:     oxyCODONE (ROXICODONE) 5 MG tablet, , Disp: , Rfl:     prednisoLONE acetate (PRED FORTE) 1 % ophthalmic suspension, , Disp: , Rfl:     predniSONE (DELTASONE) 10 MG tablet, Take 3 tablets daily for 4 day, 2 tablets daily for 4 days, 1 tablet daily for 4 days., Disp: 24 tablet, Rfl: 0    rosuvastatin (CRESTOR) 10 MG tablet, TAKE 1 TABLET BY MOUTH ONCE DAILY WITH A MEAL, Disp: , Rfl:     tirzepatide-Weight Management (ZEPBOUND) 2.5 MG/0.5ML prefilled pen, Inject 2.5 mg subcutaneously, Disp: , Rfl:      "tiZANidine (ZANAFLEX) 2 MG tablet, Take 2 mg by mouth, Disp: , Rfl:     TRAZODONE HCL PO, Take 100 mg by mouth At Bedtime , Disp: , Rfl:       Interim: Since our last visit, she has had alcohol dependency admission in August, now on antabuse. Sobriety and follow up is sober, going to  more and working with her sponsor. \"Feels better\". Had hx of 32 years of sobriety as longest. .   Had umbilical hernia repair in September at outside faciity [facility].  Also had nerve ablation to her back, Intercept procedure.   Had to hold appetite   Had tried Saxenda but not good at daily shots. Gets pretty nauseated with it.     Plan:   1.  Diet: to nourish this phase of weight loss, we'll need to hit about 1475-1550kcal/day with 85 grams of lean protein daily and 80-90 oz of water daily to feel your best and protect metabolic drive/muscle mass and immune health.     2. Exercise: gentle stretching/walking as back/hernia repairs allow for. Continue PT exercises.  3. Medication: currently about to ramp up Tirzepatide from your PCP's compounding pharmacy. This supply may dry up as tirzepatide is no longer on FDA drug shortage list. If interested in trial of compounded semaglutide let us know.  4. Track intake the next month to feel what about 1400-1500kca/day feels like as you raise your protein awareness. Start meals with protein portion first, then migrate around the plate. Separate beverages from meals to make sure you get enough food at each meal to keep hunger well controlled.   5. Goals: 1-2lbs/week of weight loss should be a good goal.   6. 2025 will be a non surgical focus in light of recent trouble with alcohol. Goal of stabilize BMI under 30 this year ahead.    We discussed [facility] Bariatric Basics including:  -eating 3 meals daily  -reviewed metabolic needs for weight loss based on Resting Metabolic Rate  -protein goals supportive of healthy weight loss  -avoiding/limiting calorie containing beverages  -We " "discussed the importance of restorative sleep and stress management in maintaining a healthy weight.  -We discussed the National Weight Control Registry healthy weight maintenance strategies and ways to optimize metabolism.  -We discussed the importance of physical activity including cardiovascular and strength training in maintaining a healthier weight and explored viable options.      Most recent labs:  Lab Results   Component Value Date    WBC 9.6 01/31/2024    HGB 13.3 01/31/2024    HCT 39.4 01/31/2024    MCV 90 01/31/2024     01/31/2024     No results found for: \"CHOL\"  No results found for: \"HDL\"  No components found for: \"LDLCALC\"  No results found for: \"TRIG\"  No results found for: \"CHOLHDL\"  Lab Results   Component Value Date    ALT 68 (H) 01/31/2024    AST 49 (H) 01/31/2024    ALKPHOS 118 01/31/2024     No results found for: \"HGBA1C\"  Lab Results   Component Value Date    B12 339 01/31/2024     No components found for: \"VITDT1\"  Lab Results   Component Value Date    UMAIR 129 01/31/2024     Lab Results   Component Value Date    PTHI 45 01/31/2024     Lab Results   Component Value Date    ZN 72.7 01/31/2024     Lab Results   Component Value Date    VIB1WB 198 (H) 01/31/2024     Lab Results   Component Value Date    TSH 0.40 01/31/2024     No results found for: \"TEST\"    DIETARY HISTORY  Reviewed intake needs. No regular routine as she recovers from various procedures.       PHYSICAL ACTIVITY PATTERNS:  Cardiovascular: limited by back issues/hernia but can walk some  Strength Training: limited    REVIEW OF SYSTEMS  Feeling better. .  PHYSICAL EXAM:  Vitals: Ht 1.664 m (5' 5.51\")   Wt 102.1 kg (225 lb)   BMI 36.86 kg/m    Weight:   Wt Readings from Last 3 Encounters:   12/05/24 102.1 kg (225 lb)   07/11/24 103 kg (227 lb)   01/31/24 110 kg (242 lb 8 oz)         GEN: Pleasant, well groomed, in no acute distress  HEENT:  normal facies .  NECK: No swelling.  HEART: .  LUNGS: No respiratory difficulty " noted. No cough. .  ABDOMEN: .  EXTREMITIES: No tremor evident..  NEURO: Alert and Oriented X3, fluent speech. .  SKIN: No visible rashes. .    Interim study results: Last Comprehensive Metabolic Panel:  Sodium   Date Value Ref Range Status   01/31/2024 138 135 - 145 mmol/L Final     Comment:     Reference intervals for this test were updated on 09/26/2023 to more accurately reflect our healthy population. There may be differences in the flagging of prior results with similar values performed with this method. Interpretation of those prior results can be made in the context of the updated reference intervals.      Potassium   Date Value Ref Range Status   01/31/2024 4.3 3.4 - 5.3 mmol/L Final     Chloride   Date Value Ref Range Status   01/31/2024 102 98 - 107 mmol/L Final     Carbon Dioxide (CO2)   Date Value Ref Range Status   01/31/2024 25 22 - 29 mmol/L Final     Anion Gap   Date Value Ref Range Status   01/31/2024 11 7 - 15 mmol/L Final     Glucose   Date Value Ref Range Status   01/31/2024 168 (H) 70 - 99 mg/dL Final     Urea Nitrogen   Date Value Ref Range Status   01/31/2024 20.3 8.0 - 23.0 mg/dL Final     Creatinine   Date Value Ref Range Status   01/31/2024 0.76 0.51 - 0.95 mg/dL Final     GFR Estimate   Date Value Ref Range Status   01/31/2024 89 >60 mL/min/1.73m2 Final     Calcium   Date Value Ref Range Status   01/31/2024 9.6 8.8 - 10.2 mg/dL Final     Bilirubin Total   Date Value Ref Range Status   01/31/2024 0.2 <=1.2 mg/dL Final     Alkaline Phosphatase   Date Value Ref Range Status   01/31/2024 118 40 - 150 U/L Final     Comment:     Reference intervals for this test were updated on 11/14/2023 to more accurately reflect our healthy population. There may be differences in the flagging of prior results with similar values performed with this method. Interpretation of those prior results can be made in the context of the updated reference intervals.     ALT   Date Value Ref Range Status   01/31/2024 68  (H) 0 - 50 U/L Final     Comment:     Reference intervals for this test were updated on 6/12/2023 to more accurately reflect our healthy population. There may be differences in the flagging of prior results with similar values performed with this method. Interpretation of those prior results can be made in the context of the updated reference intervals.       AST   Date Value Ref Range Status   01/31/2024 49 (H) 0 - 45 U/L Final     Comment:     Reference intervals for this test were updated on 6/12/2023 to more accurately reflect our healthy population. There may be differences in the flagging of prior results with similar values performed with this method. Interpretation of those prior results can be made in the context of the updated reference intervals.               .      30 minutes spent by me on the date of the encounter doing chart review, history and exam, documentation and further activities per the note   Roldan Mills MD  [facility]  7:49 AM  12/5/2024

## 2024-12-05 NOTE — LETTER
"12/5/2024      Tessie Mcdaniels  [address] [place] [address]      Dear Colleague,    Thank you for referring your patient, Tessie Mcdaniels, to the [facility]. Please see a copy of my visit note below.    Bariatric Clinic Follow-Up Visit:    Tessie Mcdaniels is a 59 year old  female with Body mass index is 36.86 kg/m .  presenting here today for follow-up on non-surgical efforts for weight loss.  She is following up today after an introductory visit on 1/31/24 in our surgical program. She was deemed to be a poor candidate from a psychological readiness standpoint after her evaluation with our Bariatric Psychologist, Dr. Edgar with his note on 4/1/24 indicating that she had been ambivalent about proceeding towards surgery and had just recently become sober in December of 2023. His assessment for future progression to surgery was:  \"In order to be deemed a viable candidate for bariatric surgery, this patient will need to complete the following treatment recommendations: Continue individual psychotherapy to focus on improved coping mechanisms, maintain medication management to promote mood stability, maintain sobriety, attend support group meetings to improve knowledge of surgery and support around surgery, continue to see the bariatric dietitian to ensure appropriate nutrition and eating behaviors, maintain mindful eating behaviors and follow up with this clinician only after she is 100% certain she wants to proceed with surgery, she has maintain sobriety for a year and her therapist feels she is in a good position to proceed.\" .     Upon review today, she would like to proceed to surgery eventually but isn't committed yet and working on medication support at present for 2024 and we reviewed combined medication therapy with mindful diet.. She reports 16 lbs weight reduction this year, 6.6% total body weight reduction. Her Victoza had been stopped " "as she ramped up Zepbound (compounded version through her PCP). Tirzepatide removed from FDA shortage list in October of 2024.  As of our 12/5/24 visit she has a vial that she's not started, with plans to ramp up to the 7.5mg/week dose but may be interested in transition to semaglutide for some cost savings.  She'll reach out if that's the case. She's had her tirzepatide interrupted by both alcohol abuse related hospital stay in August and then a hernia repair and nerve ablation surgery in the late Fall of 2024. She's not a candidate for bariatric surgery at this point due to recent alcohol abuse issues and we'll continue to work non surgically in 2025. She's had long periods of sobriety in her life before and \"feels like this time will\" stick.    Weight:   Wt Readings from Last 5 Encounters:   12/05/24 102.1 kg (225 lb)   07/11/24 103 kg (227 lb)   01/31/24 110 kg (242 lb 8 oz)   09/14/21 97.3 kg (214 lb 8.1 oz)   04/29/21 86.2 kg (190 lb)    pounds    Liver Function Studies -   Recent Labs   Lab Test 01/31/24  1512   PROTTOTAL 8.1   ALBUMIN 4.4   BILITOTAL 0.2   ALKPHOS 118   AST 49*   ALT 68*       Comorbidities:  Patient Active Problem List   Diagnosis     Major depressive disorder, recurrent episode, severe (H)     NEERU (obstructive sleep apnea)     Obstructive sleep apnea     Gastroesophageal reflux disease without esophagitis     Globus sensation     Heartburn     Adjustment disorder with anxiety     History of tobacco use     BLAKE (acute kidney injury) (H)     Alcohol use disorder, severe, dependence (H)     Anxiety     ASCUS of cervix with negative high risk HPV     Atrophic vaginitis     Chronic back pain     Eczema     History of alcoholism (H)     Hyperlipidemia     Hypocalcemia     Hypotension     Irritable bowel syndrome     Nodule of lower lobe of right lung     Opioid overdose (H)     Otalgia of both ears     Generalized anxiety disorder     Pre-diabetes     Class 2 severe obesity due to excess " calories with serious comorbidity in adult (H)     Umbilical hernia without obstruction and without gangrene       Current Outpatient Medications:      albuterol (PROAIR HFA/PROVENTIL HFA/VENTOLIN HFA) 108 (90 Base) MCG/ACT inhaler, Inhale 2 puffs into the lungs, Disp: , Rfl:      ALPRAZolam (XANAX PO), Take 1 mg by mouth as needed for anxiety, Disp: , Rfl:      amLODIPine (NORVASC) 5 MG tablet, Take 5 mg by mouth daily, Disp: , Rfl:      augmented betamethasone dipropionate (DIPROLENE AF) 0.05 % external cream, Apply sparingly to affected area twice daily as needed.  Do not apply to face., Disp: 300 g, Rfl: 3     busPIRone (BUSPAR) 15 MG tablet, , Disp: , Rfl:      celecoxib (CELEBREX) 100 MG capsule, Take 100 mg by mouth, Disp: , Rfl:      cephALEXin (KEFLEX) 500 MG capsule, , Disp: , Rfl:      desoximetasone (TOPICORT) 0.25 % OINT ointment, Apply topically 2 times daily., Disp: 100 g, Rfl: 1     disulfiram (ANTABUSE) 250 MG tablet, Take 500 mg by mouth, Disp: , Rfl:      doxycycline hyclate (VIBRA-TABS) 100 MG tablet, , Disp: , Rfl:      DULoxetine (CYMBALTA) 60 MG capsule, Take 120 mg by mouth, Disp: , Rfl:      estradiol (ESTRACE) 0.1 MG/GM vaginal cream, Place vaginally., Disp: , Rfl:      hydrOXYzine HCl (ATARAX) 50 MG tablet, Take 25-50 mg by mouth, Disp: , Rfl:      LORazepam (ATIVAN) 1 MG tablet, Take 1 tablets every 8 hours as needed with withdrawal symptoms, Disp: , Rfl:      losartan (COZAAR) 100 MG tablet, Take 100 mg by mouth daily, Disp: , Rfl:      methocarbamol (ROBAXIN) 750 MG tablet, , Disp: , Rfl:      omeprazole (PRILOSEC) 40 MG DR capsule, Take 1 capsule by mouth 2 times daily, Disp: , Rfl:      ondansetron (ZOFRAN ODT) 4 MG ODT tab, Place 1 tablet every 4 hours by translingual route as needed., Disp: , Rfl:      oxyCODONE (ROXICODONE) 5 MG tablet, , Disp: , Rfl:      prednisoLONE acetate (PRED FORTE) 1 % ophthalmic suspension, , Disp: , Rfl:      predniSONE (DELTASONE) 10 MG tablet, Take 3  "tablets daily for 4 day, 2 tablets daily for 4 days, 1 tablet daily for 4 days., Disp: 24 tablet, Rfl: 0     rosuvastatin (CRESTOR) 10 MG tablet, TAKE 1 TABLET BY MOUTH ONCE DAILY WITH A MEAL, Disp: , Rfl:      tirzepatide-Weight Management (ZEPBOUND) 2.5 MG/0.5ML prefilled pen, Inject 2.5 mg subcutaneously, Disp: , Rfl:      tiZANidine (ZANAFLEX) 2 MG tablet, Take 2 mg by mouth, Disp: , Rfl:      TRAZODONE HCL PO, Take 100 mg by mouth At Bedtime , Disp: , Rfl:       Interim: Since our last visit, she has had alcohol dependency admission in August, now on antabuse. Sobriety and follow up is sober, going to  more and working with her sponsor. \"Feels better\". Had hx of 32 years of sobriety as longest. .   Had umbilical hernia repair in September at outside [facility].  Also had nerve ablation to her back, Intercept procedure.   Had to hold appetite   Had tried Saxenda but not good at daily shots. Gets pretty nauseated with it.     Plan:   1.  Diet: to nourish this phase of weight loss, we'll need to hit about 1475-1550kcal/day with 85 grams of lean protein daily and 80-90 oz of water daily to feel your best and protect metabolic drive/muscle mass and immune health.     2. Exercise: gentle stretching/walking as back/hernia repairs allow for. Continue PT exercises.  3. Medication: currently about to ramp up Tirzepatide from your PCP's compounding pharmacy. This supply may dry up as tirzepatide is no longer on FDA drug shortage list. If interested in trial of compounded semaglutide let us know.  4. Track intake the next month to feel what about 1400-1500kca/day feels like as you raise your protein awareness. Start meals with protein portion first, then migrate around the plate. Separate beverages from meals to make sure you get enough food at each meal to keep hunger well controlled.   5. Goals: 1-2lbs/week of weight loss should be a good goal.   6. 2025 will be a non surgical focus in light of " "recent trouble with alcohol. Goal of stabilize BMI under 30 this year ahead.    We discussed [facility] Bariatric Basics including:  -eating 3 meals daily  -reviewed metabolic needs for weight loss based on Resting Metabolic Rate  -protein goals supportive of healthy weight loss  -avoiding/limiting calorie containing beverages  -We discussed the importance of restorative sleep and stress management in maintaining a healthy weight.  -We discussed the National Weight Control Registry healthy weight maintenance strategies and ways to optimize metabolism.  -We discussed the importance of physical activity including cardiovascular and strength training in maintaining a healthier weight and explored viable options.      Most recent labs:  Lab Results   Component Value Date    WBC 9.6 01/31/2024    HGB 13.3 01/31/2024    HCT 39.4 01/31/2024    MCV 90 01/31/2024     01/31/2024     No results found for: \"CHOL\"  No results found for: \"HDL\"  No components found for: \"LDLCALC\"  No results found for: \"TRIG\"  No results found for: \"CHOLHDL\"  Lab Results   Component Value Date    ALT 68 (H) 01/31/2024    AST 49 (H) 01/31/2024    ALKPHOS 118 01/31/2024     No results found for: \"HGBA1C\"  Lab Results   Component Value Date    B12 339 01/31/2024     No components found for: \"VITDT1\"  Lab Results   Component Value Date    UMAIR 129 01/31/2024     Lab Results   Component Value Date    PTHI 45 01/31/2024     Lab Results   Component Value Date    ZN 72.7 01/31/2024     Lab Results   Component Value Date    VIB1WB 198 (H) 01/31/2024     Lab Results   Component Value Date    TSH 0.40 01/31/2024     No results found for: \"TEST\"    DIETARY HISTORY  Reviewed intake needs. No regular routine as she recovers from various procedures.       PHYSICAL ACTIVITY PATTERNS:  Cardiovascular: limited by back issues/hernia but can walk some  Strength Training: limited    REVIEW OF SYSTEMS  Feeling better. .  PHYSICAL EXAM:  Vitals: Ht 1.664 m (5' " "5.51\")   Wt 102.1 kg (225 lb)   BMI 36.86 kg/m    Weight:   Wt Readings from Last 3 Encounters:   12/05/24 102.1 kg (225 lb)   07/11/24 103 kg (227 lb)   01/31/24 110 kg (242 lb 8 oz)         GEN: Pleasant, well groomed, in no acute distress  HEENT:  normal facies .  NECK: No swelling.  HEART: .  LUNGS: No respiratory difficulty noted. No cough. .  ABDOMEN: .  EXTREMITIES: No tremor evident..  NEURO: Alert and Oriented X3, fluent speech. .  SKIN: No visible rashes. .    Interim study results: Last Comprehensive Metabolic Panel:  Sodium   Date Value Ref Range Status   01/31/2024 138 135 - 145 mmol/L Final     Comment:     Reference intervals for this test were updated on 09/26/2023 to more accurately reflect our healthy population. There may be differences in the flagging of prior results with similar values performed with this method. Interpretation of those prior results can be made in the context of the updated reference intervals.      Potassium   Date Value Ref Range Status   01/31/2024 4.3 3.4 - 5.3 mmol/L Final     Chloride   Date Value Ref Range Status   01/31/2024 102 98 - 107 mmol/L Final     Carbon Dioxide (CO2)   Date Value Ref Range Status   01/31/2024 25 22 - 29 mmol/L Final     Anion Gap   Date Value Ref Range Status   01/31/2024 11 7 - 15 mmol/L Final     Glucose   Date Value Ref Range Status   01/31/2024 168 (H) 70 - 99 mg/dL Final     Urea Nitrogen   Date Value Ref Range Status   01/31/2024 20.3 8.0 - 23.0 mg/dL Final     Creatinine   Date Value Ref Range Status   01/31/2024 0.76 0.51 - 0.95 mg/dL Final     GFR Estimate   Date Value Ref Range Status   01/31/2024 89 >60 mL/min/1.73m2 Final     Calcium   Date Value Ref Range Status   01/31/2024 9.6 8.8 - 10.2 mg/dL Final     Bilirubin Total   Date Value Ref Range Status   01/31/2024 0.2 <=1.2 mg/dL Final     Alkaline Phosphatase   Date Value Ref Range Status   01/31/2024 118 40 - 150 U/L Final     Comment:     Reference intervals for this test were " updated on 11/14/2023 to more accurately reflect our healthy population. There may be differences in the flagging of prior results with similar values performed with this method. Interpretation of those prior results can be made in the context of the updated reference intervals.     ALT   Date Value Ref Range Status   01/31/2024 68 (H) 0 - 50 U/L Final     Comment:     Reference intervals for this test were updated on 6/12/2023 to more accurately reflect our healthy population. There may be differences in the flagging of prior results with similar values performed with this method. Interpretation of those prior results can be made in the context of the updated reference intervals.       AST   Date Value Ref Range Status   01/31/2024 49 (H) 0 - 45 U/L Final     Comment:     Reference intervals for this test were updated on 6/12/2023 to more accurately reflect our healthy population. There may be differences in the flagging of prior results with similar values performed with this method. Interpretation of those prior results can be made in the context of the updated reference intervals.               .      30 minutes spent by me on the date of the encounter doing chart review, history and exam, documentation and further activities per the note   Roldan Mills MD  [facility]  7:49 AM  12/5/2024    Virtual Visit Details    Type of service:  Video Visit   Video Start Time:  10:55am  Video End Time:11:26 AM    Originating Location (pt. Location): Home    Distant Location (provider location):  On-site  Platform used for Video Visit: Tish          Again, thank you for allowing me to participate in the care of your patient.        Sincerely,        Roldan Mills MD

## 2025-01-07 ENCOUNTER — VIRTUAL VISIT (OUTPATIENT)
Dept: DERMATOLOGY | Facility: CLINIC | Age: 60
End: 2025-01-07
Payer: COMMERCIAL

## 2025-01-07 DIAGNOSIS — I87.2 STASIS DERMATITIS OF BOTH LEGS: Primary | ICD-10-CM

## 2025-01-07 PROCEDURE — 98013 SYNCH AUDIO-ONLY EST LOW 20: CPT | Performed by: DERMATOLOGY

## 2025-01-07 NOTE — PROGRESS NOTES
"    Tessie Mcdaniels is a 59 year old female who is being evaluated via a phone  visit.      The patient has been notified of following:     \"This phone  visit will be conducted via a call between you and your physician/provider. We have found that certain health care needs can be provided without the need for an in-person physical exam.  This service lets us provide the care you need with a video conversation.  If a prescription is necessary we can send it directly to your pharmacy.  If lab work is needed we can place an order for that and you can then stop by our lab to have the test done at a later time.    Phone visits are billed at different rates depending on your insurance coverage.  Please reach out to your insurance provider with any questions.    If during the course of the call the physician/provider feels a phone visit is not appropriate, you will not be charged for this service.\"    Patient has given verbal consent for phone visit? Yes    How would you like to obtain your AVS? MyChart    Tessie Mcdaniels is a 59 year old year old female patient here today for follow up stasis derm all clear.  Patient has no other skin complaints today.  Remainder of the HPI, Meds, PMH, Allergies, FH, and SH was reviewed in chart.      Past Medical History:   Diagnosis Date    Fatty liver     Gastroesophageal reflux disease with esophagitis     HTN (hypertension)     Hyperlipidemia     on statin therapy    PONV (postoperative nausea and vomiting)     Sleep apnea     on cpap       Past Surgical History:   Procedure Laterality Date    ESOPHAGEAL BALLOON PROVOCATION STUDY N/A 9/14/2021    Procedure: Esophageal Balloon Provocation Study;  Surgeon: Jhon Chavez DO;  Location:  GI    TONSILLECTOMY ADULT Bilateral 11/13/2019    Procedure: Bilateral Tonsillectomy;  Surgeon: Evita Hampton MD;  Location:  OR    TURBINECTOMY Bilateral 11/13/2019    Procedure: Turbinectomy;  Surgeon: Evita Hampton MD;  " Location: [facility] OR    UVULOPALATOPHARYNGOPLASTY N/A 2019    Procedure: Uvulopalatopharyngoplasty, bilateral tonsilectomy and bilateral inferior turbinoplasty;  Surgeon: Evita Hampton MD;  Location: [facility] OR        Family History   Problem Relation Age of Onset    Kidney Disease Mother     Thyroid Disease Mother     Lung Cancer Father     Emphysema Father        Social History     Socioeconomic History    Marital status:      Spouse name: Not on file    Number of children: Not on file    Years of education: Not on file    Highest education level: Not on file   Occupational History    Not on file   Tobacco Use    Smoking status: Former     Current packs/day: 0.00     Average packs/day: 0.1 packs/day for 10.0 years (1.0 ttl pk-yrs)     Types: Cigarettes     Start date:      Quit date:      Years since quittin.0    Smokeless tobacco: Never   Vaping Use    Vaping status: Never Used   Substance and Sexual Activity    Alcohol use: Not Currently     Comment: occationally    Drug use: Never    Sexual activity: Yes     Birth control/protection: Post-menopausal   Other Topics Concern    Parent/sibling w/ CABG, MI or angioplasty before 65F 55M? Not Asked   Social History Narrative    Not on file     Social Drivers of Health     Financial Resource Strain: High Risk (2024)    [facility], [facility]    Overall Financial Resource Strain (CARDIA)     How hard is it for you to pay for the very basics like food, housing, medical care, and heating?: Hard   Food Insecurity: No Food Insecurity (8/10/2024)    Received from [facility]    Hunger Vital Sign     Worried About Running Out of Food in the Last Year: Never true     Ran Out of Food in the Last Year: Never true   Transportation Needs: No Transportation Needs (8/10/2024)    Received from [facility]    PRAPARE - Transportation     Lack of Transportation (Medical): No      Lack of Transportation (Non-Medical): No   Physical Activity: Insufficiently Active (1/9/2024)    [facility], [facility]    Exercise Vital Sign     Days of Exercise per Week: 3 days     Minutes of Exercise per Session: 30 min   Stress: Stress Concern Present (1/9/2024)    Received from [facility], [facility]    [place] [employer] - Occupational Stress Questionnaire     Do you feel stress - tense, restless, nervous, or anxious, or unable to sleep at night because your mind is troubled all the time - these days?: Rather much   Social Connections: Socially Isolated (1/9/2024)    Received from [facility], [facility]    Social Connection and Isolation Panel [NHANES]     Frequency of Communication with Friends and Family: Three times a week     Frequency of Social Gatherings with Friends and Family: Once a week     Attends Taoist Services: Never     Active Member of Clubs or Organizations: No     Attends Club or Organization Meetings: Patient declined     Marital Status:    Interpersonal Safety: Unknown (8/10/2024)    Received from [facility]    Humiliation, Afraid, Rape, and Kick questionnaire     Fear of Current or Ex-Partner: Not on file     Emotionally Abused: No     Physically Abused: No     Sexually Abused: No   Housing Stability: Unknown (8/10/2024)    Received from [facility]    Housing Stability Vital Sign     Unable to Pay for Housing in the Last Year: No     Number of Places Lived in the Last Year: Not on file     Unstable Housing in the Last Year: No       Outpatient Encounter Medications as of 1/7/2025   Medication Sig Dispense Refill    albuterol (PROAIR HFA/PROVENTIL HFA/VENTOLIN HFA) 108 (90 Base) MCG/ACT inhaler Inhale 2 puffs into the lungs      amLODIPine (NORVASC) 5 MG tablet Take 5 mg by  mouth daily      augmented betamethasone dipropionate (DIPROLENE AF) 0.05 % external cream Apply sparingly to affected area twice daily as needed.  Do not apply to face. 300 g 3    desoximetasone (TOPICORT) 0.25 % OINT ointment Apply topically 2 times daily. 100 g 1    disulfiram (ANTABUSE) 250 MG tablet Take 500 mg by mouth (Patient not taking: Reported on 12/5/2024)      doxycycline hyclate (VIBRA-TABS) 100 MG tablet       DULoxetine (CYMBALTA) 60 MG capsule Take 120 mg by mouth      hydrOXYzine HCl (ATARAX) 50 MG tablet Take 25-50 mg by mouth      losartan (COZAAR) 100 MG tablet Take 100 mg by mouth daily      omeprazole (PRILOSEC) 40 MG DR capsule Take 1 capsule by mouth 2 times daily      ondansetron (ZOFRAN ODT) 4 MG ODT tab Place 1 tablet every 4 hours by translingual route as needed.      oxyCODONE (ROXICODONE) 5 MG tablet  (Patient not taking: Reported on 12/5/2024)      prednisoLONE acetate (PRED FORTE) 1 % ophthalmic suspension       rosuvastatin (CRESTOR) 10 MG tablet TAKE 1 TABLET BY MOUTH ONCE DAILY WITH A MEAL      tirzepatide-Weight Management (ZEPBOUND) 2.5 MG/0.5ML prefilled pen Inject 2.5 mg subcutaneously (Patient not taking: Reported on 12/5/2024)      tiZANidine (ZANAFLEX) 2 MG tablet Take 2 mg by mouth      TRAZODONE HCL PO Take 100 mg by mouth At Bedtime        No facility-administered encounter medications on file as of 1/7/2025.             Review Of Systems  Skin: As above  Eyes: negative  Ears/Nose/Throat: negative  Respiratory: No shortness of breath, dyspnea on exertion, cough, or hemoptysis  Cardiovascular: negative  Gastrointestinal: negative  Genitourinary: negative  Musculoskeletal: negative  Neurologic: negative  Psychiatric: negative  Hematologic/Lymphatic/Immunologic: negative  Endocrine: negative      O:   Alert & Orientedx3, Mood & Affect wnl,    General appearance normal   Alert, oriented and in no acute distress     Photos:all clear on legs      Pulm: Breathing Normal,  talking in normal sentences, no shortness of breath during conversation    Neuro/Psych: Orientation:Alert and Orientedx3 ; Mood/Affect:normal ; no anxiety or depression     A/P:  1.Stasis derm clear  Cont compression  Cont vinegar wash once weekly  Emollient use daily discussed with patient   It was a pleasure speaking to Tessie Mcdaniels today.  Previous clinic  notes and pertinent laboratory tests were reviewed prior to Tessie Mcdaniels's visit.  Skin care regimen reviewed with patient: Eliminate harsh soaps, i.e. Dial, zest, irsih spring; Mild soaps such as Cetaphil or Dove sensitive skin, avoid hot or cold showers, aggressive use of emollients including vanicream, cetaphil or cerave discussed with patient.    Return to clinic prn     Teledermatology information:  - Location of patient: home  - Location of teledermatologist: [facility], [place], operated by [facility]   - Reason teledermatology is appropriate: of National Emergency Regarding Coronavirus disease (COVID 19) Outbreak  - The patient's condition can safely be assessed using telemedicine: yes  - Method of transmission: store and forward teledermatology  - In-person dermatology visit recommendation: no  - Service start time:10am/pm  - Service end time:1015am/pm  - Date of report: 01/07/25

## 2025-01-07 NOTE — LETTER
"1/7/2025      Tessie Mcdaniels  [address]  [place] [address]      Dear Colleague,    Thank you for referring your patient, Tessie Mcdaniels, to the [facility]. Please see a copy of my visit note below.        Tessie Mcdaniels is a 59 year old female who is being evaluated via a phone  visit.      The patient has been notified of following:     \"This phone  visit will be conducted via a call between you and your physician/provider. We have found that certain health care needs can be provided without the need for an in-person physical exam.  This service lets us provide the care you need with a video conversation.  If a prescription is necessary we can send it directly to your pharmacy.  If lab work is needed we can place an order for that and you can then stop by our lab to have the test done at a later time.    Phone visits are billed at different rates depending on your insurance coverage.  Please reach out to your insurance provider with any questions.    If during the course of the call the physician/provider feels a phone visit is not appropriate, you will not be charged for this service.\"    Patient has given verbal consent for phone visit? Yes    How would you like to obtain your AVS? MyChart    Tessie Mcdaniels is a 59 year old year old female patient here today for follow up stasis derm all clear.  Patient has no other skin complaints today.  Remainder of the HPI, Meds, PMH, Allergies, FH, and SH was reviewed in chart.      Past Medical History:   Diagnosis Date     Fatty liver      Gastroesophageal reflux disease with esophagitis      HTN (hypertension)      Hyperlipidemia     on statin therapy     PONV (postoperative nausea and vomiting)      Sleep apnea     on cpap       Past Surgical History:   Procedure Laterality Date     ESOPHAGEAL BALLOON PROVOCATION STUDY N/A 9/14/2021    Procedure: Esophageal Balloon Provocation Study;  Surgeon: Jhon Chavez DO;  Location: [facility] " GI     TONSILLECTOMY ADULT Bilateral 2019    Procedure: Bilateral Tonsillectomy;  Surgeon: Evita Hampton MD;  Location: [facility] OR     TURBINECTOMY Bilateral 2019    Procedure: Turbinectomy;  Surgeon: Evita Hampton MD;  Location:  OR     UVULOPALATOPHARYNGOPLASTY N/A 2019    Procedure: Uvulopalatopharyngoplasty, bilateral tonsilectomy and bilateral inferior turbinoplasty;  Surgeon: Evita Hampton MD;  Location: [facility] OR        Family History   Problem Relation Age of Onset     Kidney Disease Mother      Thyroid Disease Mother      Lung Cancer Father      Emphysema Father        Social History     Socioeconomic History     Marital status:      Spouse name: Not on file     Number of children: Not on file     Years of education: Not on file     Highest education level: Not on file   Occupational History     Not on file   Tobacco Use     Smoking status: Former     Current packs/day: 0.00     Average packs/day: 0.1 packs/day for 10.0 years (1.0 ttl pk-yrs)     Types: Cigarettes     Start date:      Quit date: 2014     Years since quittin.0     Smokeless tobacco: Never   Vaping Use     Vaping status: Never Used   Substance and Sexual Activity     Alcohol use: Not Currently     Comment: occationally     Drug use: Never     Sexual activity: Yes     Birth control/protection: Post-menopausal   Other Topics Concern     Parent/sibling w/ CABG, MI or angioplasty before 65F 55M? Not Asked   Social History Narrative     Not on file     Social Drivers of Health     Financial Resource Strain: High Risk (2024)    Received from [facility]    Overall Financial Resource Strain (CARDIA)      How hard is it for you to pay for the very basics like food, housing, medical care, and heating?: Hard   Food Insecurity: No Food Insecurity (8/10/2024)    [facility]    Hunger Vital Sign      Worried  About Running Out of Food in the Last Year: Never true      Ran Out of Food in the Last Year: Never true   Transportation Needs: No Transportation Needs (8/10/2024)    Received from [facility]    PRAPARE - Transportation      Lack of Transportation (Medical): No      Lack of Transportation (Non-Medical): No   Physical Activity: Insufficiently Active (1/9/2024)    [facility]    Exercise Vital Sign      Days of Exercise per Week: 3 days      Minutes of Exercise per Session: 30 min   Stress: Stress Concern Present (1/9/2024)    [facility]    [employer] [place] [employer] - Occupational Stress Questionnaire      Do you feel stress - tense, restless, nervous, or anxious, or unable to sleep at night because your mind is troubled all the time - these days?: Rather much   Social Connections: Socially Isolated (1/9/2024)    Received from [facility], [facility]    Social Connection and Isolation Panel [NHANES]      Frequency of Communication with Friends and Family: Three times a week      Frequency of Social Gatherings with Friends and Family: Once a week      Attends Religion Services: Never      Active Member of Clubs or Organizations: No      Attends Club or Organization Meetings: Patient declined      Marital Status:    Interpersonal Safety: Unknown (8/10/2024)    Received from [facility]    Humiliation, Afraid, Rape, and Kick questionnaire      Fear of Current or Ex-Partner: Not on file      Emotionally Abused: No      Physically Abused: No      Sexually Abused: No   Housing Stability: Unknown (8/10/2024)    Received from [facility]    Housing Stability Vital Sign      Unable to Pay for Housing in the Last Year: No      Number of Places Lived in the Last Year: Not on file       Unstable Housing in the Last Year: No       Outpatient Encounter Medications as of 1/7/2025   Medication Sig Dispense Refill     albuterol (PROAIR HFA/PROVENTIL HFA/VENTOLIN HFA) 108 (90 Base) MCG/ACT inhaler Inhale 2 puffs into the lungs       amLODIPine (NORVASC) 5 MG tablet Take 5 mg by mouth daily       augmented betamethasone dipropionate (DIPROLENE AF) 0.05 % external cream Apply sparingly to affected area twice daily as needed.  Do not apply to face. 300 g 3     desoximetasone (TOPICORT) 0.25 % OINT ointment Apply topically 2 times daily. 100 g 1     disulfiram (ANTABUSE) 250 MG tablet Take 500 mg by mouth (Patient not taking: Reported on 12/5/2024)       doxycycline hyclate (VIBRA-TABS) 100 MG tablet        DULoxetine (CYMBALTA) 60 MG capsule Take 120 mg by mouth       hydrOXYzine HCl (ATARAX) 50 MG tablet Take 25-50 mg by mouth       losartan (COZAAR) 100 MG tablet Take 100 mg by mouth daily       omeprazole (PRILOSEC) 40 MG DR capsule Take 1 capsule by mouth 2 times daily       ondansetron (ZOFRAN ODT) 4 MG ODT tab Place 1 tablet every 4 hours by translingual route as needed.       oxyCODONE (ROXICODONE) 5 MG tablet  (Patient not taking: Reported on 12/5/2024)       prednisoLONE acetate (PRED FORTE) 1 % ophthalmic suspension        rosuvastatin (CRESTOR) 10 MG tablet TAKE 1 TABLET BY MOUTH ONCE DAILY WITH A MEAL       tirzepatide-Weight Management (ZEPBOUND) 2.5 MG/0.5ML prefilled pen Inject 2.5 mg subcutaneously (Patient not taking: Reported on 12/5/2024)       tiZANidine (ZANAFLEX) 2 MG tablet Take 2 mg by mouth       TRAZODONE HCL PO Take 100 mg by mouth At Bedtime        No facility-administered encounter medications on file as of 1/7/2025.             Review Of Systems  Skin: As above  Eyes: negative  Ears/Nose/Throat: negative  Respiratory: No shortness of breath, dyspnea on exertion, cough, or hemoptysis  Cardiovascular: negative  Gastrointestinal: negative  Genitourinary: negative  Musculoskeletal:  negative  Neurologic: negative  Psychiatric: negative  Hematologic/Lymphatic/Immunologic: negative  Endocrine: negative      O:   Alert & Orientedx3, Mood & Affect wnl,    General appearance normal   Alert, oriented and in no acute distress     Photos:all clear on legs      Pulm: Breathing Normal, talking in normal sentences, no shortness of breath during conversation    Neuro/Psych: Orientation:Alert and Orientedx3 ; Mood/Affect:normal ; no anxiety or depression     A/P:  1.Stasis derm clear  Cont compression  Cont vinegar wash once weekly  Emollient use daily discussed with patient   It was a pleasure speaking to Tessie Mcdaniels today.  Previous clinic  notes and pertinent laboratory tests were reviewed prior to Tessie Mcdaniels's visit.  Skin care regimen reviewed with patient: Eliminate harsh soaps, i.e. Dial, zest, irsih spring; Mild soaps such as Cetaphil or Dove sensitive skin, avoid hot or cold showers, aggressive use of emollients including vanicream, cetaphil or cerave discussed with patient.    Return to clinic prn     Teledermatology information:  - Location of patient: home  - Location of teledermatologist: [facility]   - Reason teledermatology is appropriate: of National Emergency Regarding Coronavirus disease (COVID 19) Outbreak  - The patient's condition can safely be assessed using telemedicine: yes  - Method of transmission: store and forward teledermatology  - In-person dermatology visit recommendation: no  - Service start time:10am/pm  - Service end time:1015am/pm  - Date of report: 01/07/25      Again, thank you for allowing me to participate in the care of your patient.        Sincerely,        Ash Tolbert MD    Electronically signed

## 2025-01-29 DIAGNOSIS — E66.812 CLASS 2 SEVERE OBESITY DUE TO EXCESS CALORIES WITH SERIOUS COMORBIDITY AND BODY MASS INDEX (BMI) OF 37.0 TO 37.9 IN ADULT (H): Primary | ICD-10-CM

## 2025-01-29 DIAGNOSIS — K76.9 LIVER DISEASE, UNSPECIFIED: ICD-10-CM

## 2025-01-29 DIAGNOSIS — E66.01 CLASS 2 SEVERE OBESITY DUE TO EXCESS CALORIES WITH SERIOUS COMORBIDITY AND BODY MASS INDEX (BMI) OF 37.0 TO 37.9 IN ADULT (H): Primary | ICD-10-CM

## 2025-01-29 DIAGNOSIS — G47.33 OSA (OBSTRUCTIVE SLEEP APNEA): ICD-10-CM

## 2025-01-29 NOTE — PROGRESS NOTES
Interest in starting compounded semaglutide per our discussion per our December 2024 visit.   Roldan Mills MD    
[FreeTextEntry1] :  68 year old female patient seen in follow-up for microhematuria. Her urinalysis in office today showed some blood and was sent for micro analysis to confirm. Her ultrasound was reviewed and results are as listed below. She complains of right sided back pain, only with movement. This pain is not consistent with renal colic. Denies any voiding issues, gross hematuria, or dysuria. Pt comes in today to discuss her ultrasound results.    Renal and bladder Ultrasound taken on 07/31/24:  4 mm stone within right kidney Mild dilation of both renal collecting system suggesting possible mild bilateral hydronephrosis, of uncertain etiology. Consider follow-up CT.  Small bilateral renal cysts.  BLADDER: Prevoid voume 343 cc, post void 19 cc. No bladder wall thickening. Left ureteral jet was visualized. Right ureteral jet not visualized.   
Abdomen soft, non-tender and non-distended without organomegaly or masses. Normal bowel sounds.

## 2025-01-30 ENCOUNTER — OFFICE VISIT (OUTPATIENT)
Dept: DERMATOLOGY | Facility: CLINIC | Age: 60
End: 2025-01-30
Payer: COMMERCIAL

## 2025-01-30 DIAGNOSIS — D22.9 NEVUS: ICD-10-CM

## 2025-01-30 DIAGNOSIS — D18.01 ANGIOMA OF SKIN: ICD-10-CM

## 2025-01-30 DIAGNOSIS — L82.0 INFLAMED SEBORRHEIC KERATOSIS: Primary | ICD-10-CM

## 2025-01-30 DIAGNOSIS — L82.1 SEBORRHEIC KERATOSIS: ICD-10-CM

## 2025-01-30 DIAGNOSIS — D48.5 NEOPLASM OF UNCERTAIN BEHAVIOR OF SKIN: ICD-10-CM

## 2025-01-30 DIAGNOSIS — L81.4 LENTIGO: ICD-10-CM

## 2025-01-30 ASSESSMENT — PAIN SCALES - GENERAL: PAINLEVEL_OUTOF10: NO PAIN (0)

## 2025-01-30 NOTE — PROGRESS NOTES
HPI:   Chief complaints: Tessie Mcdaniels is a pleasant 59 year old female who presents for Full skin cancer screening to rule out skin cancer   Last Skin Exam: n/a      1st Baseline: Yes  Personal HX of Skin Cancer: no   Personal HX of Malignant Melanoma: no   Family HX of Skin Cancer / Malignant Melanoma: no  Personal HX of Atypical Moles:   no  Risk factors: history of sun exposure and  burns  New / Changing lesions:yes irritated spot on the right back  Social History: going to [employer] tomorrow  On review of systems, there are no further skin complaints, patient is feeling otherwise well.   ROS of the following were done and are negative: Constitutional, Eyes, Ears, Nose,   Mouth, Throat, Cardiovascular, Respiratory, GI, Genitourinary, Musculoskeletal,   Psychiatric, Endocrine, Allergic/Immunologic.    PHYSICAL EXAM:   There were no vitals taken for this visit.  Skin exam performed as follows: Type 2 skin. Mood appropriate  Alert and Oriented X 3. Well developed, well nourished in no distress.  General appearance: Normal  Head including face: Normal  Eyes: conjunctiva and lids: Normal  Mouth: Lips, teeth, gums: Normal  Neck: Normal  Chest-breast/axillae: Normal  Back: Normal  Extremities: digits/nails (clubbing): Normal  Eccrine and Apocrine glands: Normal  Right upper extremity: Normal  Left upper extremity: Normal  Right lower extremity: Normal  Left lower extremity: Normal  Skin: Scalp and body hair: See below    Pt deferred exam of breasts, groin, buttocks: No    Other physical findings:  1. Multiple pigmented macules on extremities and trunk  2. Multiple pigmented macules on face, trunk and extremities  3. Multiple vascular papules on trunk, arms and legs  4. Multiple scattered keratotic plaques  5. Inflamed keratotic plaque on the right mid back x 1  6. 4 mm pink brown macule on the central back       Except as noted above, no other signs of skin cancer or melanoma.     ASSESSMENT/PLAN:   Benign Full  skin cancer screening today. . Patient with history of none  Advised on monthly self exams and 1 year  Patient Education: Appropriate brochures given.    Multiple benign appearing melanocytic nevi on arms, legs and trunk. Discussed ABCDEs of melanoma and sunscreen.   Multiple lentigos on arms, legs and trunk. Advised benign, no treatment needed.  Multiple scattered angiomas. Advised benign, no treatment needed.   Seborrheic keratosis on arms, legs and trunk. Advised benign, no treatment needed.  Inflamed seborrheic keratosis on the right mid back x 1. As physically tender cryosurgery performed. Advised on post op care.   R/o atypical nevus on the central back. Shave biopsy performed.  Area cleaned and anesthetized with 1% lidocaine with epinephrine.  Dermablade used to remove the lesion and sent to pathology. Bleeding was cauterized. Pt tolerated procedure well with no complications.             Follow-up: yearly/PRN sooner    1.) Patient was asked about new and changing moles. YES  2.) Patient received a complete physical skin examination: YES  3.) Patient was counseled to perform a monthly self skin examination: YES  Scribed By: Imelda Moran, MS, PA-C

## 2025-01-30 NOTE — NURSING NOTE
Tessie Mcdaniels's chief complaint for this visit includes:  Chief Complaint   Patient presents with    Derm Problem     Patient is here for a skin check and is concerned about a mole on right upper back. Patient dermatitis on shins is controlled at this time and has enough cream at this time.      PCP: Jammie Ramos    Referring Provider:  Referred Self, MD  No address on file    There were no vitals taken for this visit.  No Pain (0)        Allergies   Allergen Reactions    Diflucan [Fluconazole]      Mouth sores    Sulfa Antibiotics Nausea and Vomiting, Nausea and GI Disturbance         Do you need any medication refills at today's visit? No    Rox Donis MA

## 2025-01-30 NOTE — LETTER
1/30/2025      Tessie Mcdaniels  [address]  [place] [address]      Dear Colleague,    Thank you for referring your patient, Tessie Mcdaniels, to the [facility]. Please see a copy of my visit note below.    HPI:   Chief complaints: Tessie Mcdaniels is a pleasant 59 year old female who presents for Full skin cancer screening to rule out skin cancer   Last Skin Exam: n/a      1st Baseline: Yes  Personal HX of Skin Cancer: no   Personal HX of Malignant Melanoma: no   Family HX of Skin Cancer / Malignant Melanoma: no  Personal HX of Atypical Moles:   no  Risk factors: history of sun exposure and  burns  New / Changing lesions:yes irritated spot on the right back  Social History: going to [employer] tomorrow  On review of systems, there are no further skin complaints, patient is feeling otherwise well.   ROS of the following were done and are negative: Constitutional, Eyes, Ears, Nose,   Mouth, Throat, Cardiovascular, Respiratory, GI, Genitourinary, Musculoskeletal,   Psychiatric, Endocrine, Allergic/Immunologic.    PHYSICAL EXAM:   There were no vitals taken for this visit.  Skin exam performed as follows: Type 2 skin. Mood appropriate  Alert and Oriented X 3. Well developed, well nourished in no distress.  General appearance: Normal  Head including face: Normal  Eyes: conjunctiva and lids: Normal  Mouth: Lips, teeth, gums: Normal  Neck: Normal  Chest-breast/axillae: Normal  Back: Normal  Extremities: digits/nails (clubbing): Normal  Eccrine and Apocrine glands: Normal  Right upper extremity: Normal  Left upper extremity: Normal  Right lower extremity: Normal  Left lower extremity: Normal  Skin: Scalp and body hair: See below    Pt deferred exam of breasts, groin, buttocks: No    Other physical findings:  1. Multiple pigmented macules on extremities and trunk  2. Multiple pigmented macules on face, trunk and extremities  3. Multiple vascular papules on trunk, arms and legs  4.  Multiple scattered keratotic plaques  5. Inflamed keratotic plaque on the right mid back x 1  6. 4 mm pink brown macule on the central back       Except as noted above, no other signs of skin cancer or melanoma.     ASSESSMENT/PLAN:   Benign Full skin cancer screening today. . Patient with history of none  Advised on monthly self exams and 1 year  Patient Education: Appropriate brochures given.    Multiple benign appearing melanocytic nevi on arms, legs and trunk. Discussed ABCDEs of melanoma and sunscreen.   Multiple lentigos on arms, legs and trunk. Advised benign, no treatment needed.  Multiple scattered angiomas. Advised benign, no treatment needed.   Seborrheic keratosis on arms, legs and trunk. Advised benign, no treatment needed.  Inflamed seborrheic keratosis on the right mid back x 1. As physically tender cryosurgery performed. Advised on post op care.   R/o atypical nevus on the central back. Shave biopsy performed.  Area cleaned and anesthetized with 1% lidocaine with epinephrine.  Dermablade used to remove the lesion and sent to pathology. Bleeding was cauterized. Pt tolerated procedure well with no complications.             Follow-up: yearly/PRN sooner    1.) Patient was asked about new and changing moles. YES  2.) Patient received a complete physical skin examination: YES  3.) Patient was counseled to perform a monthly self skin examination: YES  Scribed By: Imelda Moran, MS, PAAustynC      Again, thank you for allowing me to participate in the care of your patient.        Sincerely,        Imelda Moran PA-C    Electronically signed

## 2025-01-30 NOTE — PATIENT INSTRUCTIONS
Wound Care Instructions     FOR SUPERFICIAL WOUNDS     [facility]  [phone]                 AFTER 24 HOURS YOU SHOULD REMOVE THE BANDAGE AND BEGIN DAILY DRESSING CHANGES AS FOLLOWS:     1) Remove Dressing.     2) Clean and dry the area with tap water using a Q-tip or sterile gauze pad.     3) Apply Vaseline, Aquaphor, Polysporin ointment or Bacitracin ointment over entire wound.  Do NOT use Neosporin ointment.     4) Cover the wound with a band-aid, or a sterile non-stick gauze pad and micropore paper tape    REPEAT THESE INSTRUCTIONS AT LEAST ONCE A DAY UNTIL THE WOUND HAS COMPLETELY HEALED.    It is an old wives tale that a wound heals better when it is exposed to air and allowed to dry out. The wound will heal faster with a better cosmetic result if it is kept moist with ointment and covered with a bandage.    **Do not let the wound dry out.**    Supplies Needed:      *Cotton tipped applicators (Q-tips)    *Vaseline, Aquaphor, Polysporin or Bacitracin Ointment (NOT NEOSPORIN)    *Band-aids or non-stick gauze pads and micropore paper tape.      PATIENT INFORMATION:    During the healing process you will notice a number of changes. All wounds develop a small halo of redness surrounding the wound.  This means healing is occurring. Severe itching with extensive redness usually indicates sensitivity to the ointment or bandage tape used to dress the wound.  You should call our office if this develops.      Swelling  and/or discoloration around your surgical site is common, particularly when performed around the eye.    All wounds normally drain.  The larger the wound the more drainage there will be.  After 7-10 days, you will notice the wound beginning to shrink and new skin will begin to grow.  The wound is healed when you can see skin has formed over the entire area.  A healed wound has a healthy, shiny look to the surface and is red to dark pink in color to normalize.  Wounds may  take approximately 4-6 weeks to heal.  Larger wounds may take 6-8 weeks.  After the wound is healed you may discontinue dressing changes.    You may experience a sensation of tightness as your wound heals. This is normal and will gradually subside.    Your healed wound may be sensitive to temperature changes. This sensitivity improves with time, but if you re having a lot of discomfort, try to avoid temperature extremes.    Patients frequently experience itching after their wound appears to have healed because of the continue healing under the skin.  Plain Vaseline will help relieve the itching.      POSSIBLE COMPLICATIONS    BLEEDING:    Leave the bandage in place.  Use tightly rolled up gauze or a cloth to apply direct pressure over the bandage for 30  minutes.  Reapply pressure for an additional 30 minutes if necessary  Use additional gauze and tape to maintain pressure once the bleeding has stopped.

## 2025-02-05 LAB
PATH REPORT.COMMENTS IMP SPEC: NORMAL
PATH REPORT.FINAL DX SPEC: NORMAL
PATH REPORT.GROSS SPEC: NORMAL
PATH REPORT.MICROSCOPIC SPEC OTHER STN: NORMAL
PATH REPORT.RELEVANT HX SPEC: NORMAL

## 2025-02-06 ENCOUNTER — TELEPHONE (OUTPATIENT)
Dept: DERMATOLOGY | Facility: CLINIC | Age: 60
End: 2025-02-06
Payer: COMMERCIAL

## 2025-02-06 DIAGNOSIS — D22.9 ATYPICAL MOLE: Primary | ICD-10-CM

## 2025-02-06 NOTE — TELEPHONE ENCOUNTER
----- Message from Imelda Lui sent at 2/6/2025  8:09 AM CST -----  Central back severely atypical nevus please schedule excision

## 2025-02-06 NOTE — LETTER
February 6, 2025    Tessie Mcdaniels  [address] [facility]  [place] [facility] [place] [address]      Dear Tessie      You are scheduled for Mohs Surgery on Monday, February 24, 2025  at 7:30  am for the treatment of a Severely atypical mole.    Please check in at 2nd floor Dermatology Clinic.     Be sure to eat a good breakfast and bathe and wash your hair prior to Surgery. Please bring  with you if this is above your neck    If you are taking any anti-coagulants that are prescribed by your Doctor (such as Coumadin/warfarin, Plavix, Aspirin, Ibuprofen), please continue taking them.     However, If you are taking anti-coagulants over the counter without  a Doctor's order for a Medical condition, please discontinue them 10 days prior to Surgery.      Please wear loose comfortable clothing as it could possibly be 4-6 hours until your surgery is completed depending upon how many layers of tissue need to be removed.     If you need any mobility assistance (getting on the exam chair or toilet) please bring a caregiver, family member, or staff member to assist you. We are not equipped to transfer patients.    Thank you,    Ash Tolbert MD

## 2025-02-06 NOTE — TELEPHONE ENCOUNTER
Called patient:  Patient notified and educated on test results   Mohs procedure explained- all questions answered  appointment scheduled- mohs packet mailed.    Thank you,    Isabelle BASILIORN BSN  [facility]  [phone]

## 2025-02-06 NOTE — TELEPHONE ENCOUNTER
Patient Contact    Attempt # 1    Was call answered?  No    Called patient. No answer. Left message to call back. Clinic number was provided.     Nisha Camacho LPN   [facility]   [phone]

## 2025-02-06 NOTE — TELEPHONE ENCOUNTER
[facility] Call Center    Phone Message    May a detailed message be left on voicemail: yes     Reason for Call: Other: Pt is returning a call to discuss results. Please call back. Thank you.      Action Taken: Other: [place] [facility]    Travel Screening: Not Applicable     Date of Service:

## 2025-02-19 ENCOUNTER — OFFICE VISIT (OUTPATIENT)
Dept: DERMATOLOGY | Facility: CLINIC | Age: 60
End: 2025-02-19
Attending: PHYSICIAN ASSISTANT
Payer: COMMERCIAL

## 2025-02-19 DIAGNOSIS — E66.01 CLASS 2 SEVERE OBESITY DUE TO EXCESS CALORIES WITH SERIOUS COMORBIDITY AND BODY MASS INDEX (BMI) OF 37.0 TO 37.9 IN ADULT (H): ICD-10-CM

## 2025-02-19 DIAGNOSIS — D22.9 ATYPICAL MOLE: ICD-10-CM

## 2025-02-19 DIAGNOSIS — E66.812 CLASS 2 SEVERE OBESITY DUE TO EXCESS CALORIES WITH SERIOUS COMORBIDITY AND BODY MASS INDEX (BMI) OF 37.0 TO 37.9 IN ADULT (H): ICD-10-CM

## 2025-02-19 PROCEDURE — 88342 IMHCHEM/IMCYTCHM 1ST ANTB: CPT | Performed by: DERMATOLOGY

## 2025-02-19 PROCEDURE — 88331 PATH CONSLTJ SURG 1 BLK 1SPC: CPT | Performed by: DERMATOLOGY

## 2025-02-19 PROCEDURE — 11402 EXC TR-EXT B9+MARG 1.1-2 CM: CPT | Performed by: DERMATOLOGY

## 2025-02-19 ASSESSMENT — PAIN SCALES - GENERAL: PAINLEVEL_OUTOF10: NO PAIN (0)

## 2025-02-19 NOTE — LETTER
2/19/2025      Tessie Mcdaniels  [address]  [place] [address]      Dear Colleague,    Thank you for referring your patient, Tessie Mcdaniels, to the [facility]. Please see a copy of my visit note below.    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  Patient has no other skin complaints today.  Remainder of the HPI, Meds, PMH, Allergies, FH, and SH was reviewed in chart.      Past Medical History:   Diagnosis Date     Fatty liver      Gastroesophageal reflux disease with esophagitis      HTN (hypertension)      Hyperlipidemia     on statin therapy     PONV (postoperative nausea and vomiting)      Sleep apnea     on cpap       Past Surgical History:   Procedure Laterality Date     ESOPHAGEAL BALLOON PROVOCATION STUDY N/A 9/14/2021    Procedure: Esophageal Balloon Provocation Study;  Surgeon: Jhon Chavez DO;  Location: [facility]     TONSILLECTOMY ADULT Bilateral 11/13/2019    Procedure: Bilateral Tonsillectomy;  Surgeon: Evita Hampton MD;  Location: [facility]     TURBINECTOMY Bilateral 11/13/2019    Procedure: Turbinectomy;  Surgeon: Evita Hampton MD;  Location: [facility]     UVULOPALATOPHARYNGOPLASTY N/A 11/13/2019    Procedure: Uvulopalatopharyngoplasty, bilateral tonsilectomy and bilateral inferior turbinoplasty;  Surgeon: Evita Hampton MD;  Location: [facility]        Family History   Problem Relation Age of Onset     Kidney Disease Mother      Thyroid Disease Mother      Lung Cancer Father      Emphysema Father        Social History     Socioeconomic History     Marital status:      Spouse name: Not on file     Number of children: Not on file     Years of education: Not on file     Highest education level: Not on file   Occupational History     Not  on file   Tobacco Use     Smoking status: Former     Current packs/day: 0.00     Average packs/day: 0.1 packs/day for 10.0 years (1.0 ttl pk-yrs)     Types: Cigarettes     Start date:      Quit date:      Years since quittin.1     Smokeless tobacco: Never   Vaping Use     Vaping status: Never Used   Substance and Sexual Activity     Alcohol use: Not Currently     Comment: occationally     Drug use: Never     Sexual activity: Yes     Birth control/protection: Post-menopausal   Other Topics Concern     Parent/sibling w/ CABG, MI or angioplasty before 65F 55M? Not Asked   Social History Narrative     Not on file     Social Drivers of Health     Financial Resource Strain: High Risk (2024)    Received from [facility], [facility]    Overall Financial Resource Strain (CARDIA)      How hard is it for you to pay for the very basics like food, housing, medical care, and heating?: Hard   Food Insecurity: No Food Insecurity (8/10/2024)    Received from [facility]    Hunger Vital Sign      Worried About Running Out of Food in the Last Year: Never true      Ran Out of Food in the Last Year: Never true   Transportation Needs: No Transportation Needs (8/10/2024)    Received from [facility]    PRAPARE - Transportation      Lack of Transportation (Medical): No      Lack of Transportation (Non-Medical): No   Physical Activity: Insufficiently Active (2024)    Received from [facility], [facility]    Exercise Vital Sign      Days of Exercise per Week: 3 days      Minutes of Exercise per Session: 30 min   Stress: Stress Concern Present (2024)    Received from [facility], [facility] - Occupational Stress Questionnaire      Do you feel stress - tense, restless, nervous, or anxious, or unable to sleep at  night because your mind is troubled all the time - these days?: Rather much   Social Connections: Socially Isolated (1/9/2024)    Received from [facility], [facility]    Social Connection and Isolation Panel [NHANES]      Frequency of Communication with Friends and Family: Three times a week      Frequency of Social Gatherings with Friends and Family: Once a week      Attends Alevism Services: Never      Active Member of Clubs or Organizations: No      Attends Club or Organization Meetings: Patient declined      Marital Status:    Interpersonal Safety: Unknown (8/10/2024)    Received from [facility]    Humiliation, Afraid, Rape, and Kick questionnaire      Fear of Current or Ex-Partner: Not on file      Emotionally Abused: No      Physically Abused: No      Sexually Abused: No   Housing Stability: Unknown (8/10/2024)    Received from [facility]    Housing Stability Vital Sign      Unable to Pay for Housing in the Last Year: No      Number of Places Lived in the Last Year: Not on file      Unstable Housing in the Last Year: No       Outpatient Encounter Medications as of 2/19/2025   Medication Sig Dispense Refill     albuterol (PROAIR HFA/PROVENTIL HFA/VENTOLIN HFA) 108 (90 Base) MCG/ACT inhaler Inhale 2 puffs into the lungs       amLODIPine (NORVASC) 5 MG tablet Take 5 mg by mouth daily       augmented betamethasone dipropionate (DIPROLENE AF) 0.05 % external cream Apply sparingly to affected area twice daily as needed.  Do not apply to face. 300 g 3     desoximetasone (TOPICORT) 0.25 % OINT ointment Apply topically 2 times daily. 100 g 1     DULoxetine (CYMBALTA) 60 MG capsule Take 120 mg by mouth       hydrOXYzine HCl (ATARAX) 50 MG tablet Take 25-50 mg by mouth       losartan (COZAAR) 100 MG tablet Take 100 mg by mouth daily       omeprazole (PRILOSEC) 40 MG DR capsule Take 1 capsule by mouth 2 times daily       oxyCODONE (ROXICODONE) 5 MG  tablet        rosuvastatin (CRESTOR) 10 MG tablet TAKE 1 TABLET BY MOUTH ONCE DAILY WITH A MEAL       TRAZODONE HCL PO Take 100 mg by mouth At Bedtime        COMPOUNDED NON-CONTROLLED SUBSTANCE (CMPD RX) - PHARMACY TO MIX COMPOUNDED MEDICATION Start compounded semaglutide: 0.25mg/week for 2 weeks then increase to 0.5mg/week for 4 weeks then increase to 1mg/week and stay at that dose if tolerated. (Patient not taking: Reported on 2/19/2025) 1 mL 4     disulfiram (ANTABUSE) 250 MG tablet Take 500 mg by mouth (Patient not taking: Reported on 1/30/2025)       doxycycline hyclate (VIBRA-TABS) 100 MG tablet        ondansetron (ZOFRAN ODT) 4 MG ODT tab Place 1 tablet every 4 hours by translingual route as needed.       prednisoLONE acetate (PRED FORTE) 1 % ophthalmic suspension        tiZANidine (ZANAFLEX) 2 MG tablet Take 2 mg by mouth       No facility-administered encounter medications on file as of 2/19/2025.             O:   NAD, WDWN, Alert & Oriented, Mood & Affect wnl, Vitals stable   General appearance normal   Vitals stable   Alert, oriented and in no acute distress     Mid back 5mm red scaly papule       Eyes: Conjunctivae/lids:Normal     ENT: Lips, mucosa: normal    MSK:Normal    Cardiovascular: peripheral edema none    Pulm: Breathing Normal    Neuro/Psych: Orientation:Alert and Orientedx3 ; Mood/Affect:normal       MICRO:   Mid back:Unremarkable epidermis, dermis and superficial subcutis.  No concerning areas for malignancy.    Mart1 negative   A/P:  Atypical nevus  Atypical moles are unusual benign moles that may resemble melanoma. People who have them are at increased risk of developing single or multiple melanomas. The higher the number of these moles someone has, the higher the risk; those who have 10 or more have 12 times the risk of developing melanoma compared to the general population. Atypical nevi are found significantly more often in melanoma patients than in the general population.    While  "atypical moles are considered to be pre-cancerous (more likely to turn into melanoma than regular moles), not everyone who has atypical moles gets melanoma. In fact, most moles -- both ordinary and atypical ones -- never become cancerous. Thus the removal of all atypical nevi is unnecessary. In fact, most of the melanomas found on people with atypical moles arise from normal skin and not an atypical mole.    Although a physician bases the initial diagnosis of atypical moles on a physical examination, removing several moles and examining them under a microscope must confirm the diagnosis. This procedure, is called a biopsy.    A pathologist will examine the tissue under a microscope and make the precise diagnosis. Diagnosis by biopsy is not exact, and in difficult cases doctors may split 50/50 down the middle as to whether a mole is melanoma or benign. If the pathologist uses the term \"severely dysplastic\" or \"atypical melanocytic hyperplasia\" or offers a long descriptive narrative it means he really is concerned about melanoma, but does not want to call it that.    Most dermatologists usually recommend that all patients with these severely dysplastic moles have them removed. Also many dermatologists recommend removing \"moderate dysplasia\" moles, if the biopsy didn't get all of it. Those with \"mild dysplasia\" can usually be left alone or watched.    Mid back AN  EXCISION OF Atypical nevus, Margins confirmed with FROZEN SECTIONS, MART1 stain AND Second intent: [facility], consent obtained, anesthesia and prep, the margins of the lesion were identified and an incision was made encompassing the lesion with 3mm margin. The incisions were made through the skin and down to and including the superficial subcutis.  The lesion was removed en bloc and submitted for frozen section pathologic review. Clear margins obtained (1.1cm).  MART1 stain performed one 1 section(s).    REPAIR SECOND INTENT: We " discussed the options for wound management in full with the patient including risks/benefits/possible outcomes. Decision made to allow the wound to heal by second intention. EBL minimal; complications none; wound care routine.  The patient was discharged in good condition and will return in one month or prn for wound evaluation.      It was a pleasure speaking to Tessie Mcdaniels today.  Previous clinic notes and pertinent laboratory tests were reviewed prior to Tessie Mcdaniels's visit.  Patient encouraged to perform monthly skin exams.  UV precautions reviewed with patient.  Return to clinic 6 months        Again, thank you for allowing me to participate in the care of your patient.        Sincerely,        Ash Tolbert MD    Electronically signed

## 2025-02-19 NOTE — NURSING NOTE
Tessie Mcdaniels's chief complaint for this visit includes:  Chief Complaint   Patient presents with    Derm Problem     Mohs- central back     PCP: Jammie Ramos    Referring Provider:  Referred Self, MD  No address on file    There were no vitals taken for this visit.  No Pain (0)        Allergies   Allergen Reactions    Diflucan [Fluconazole]      Mouth sores    Sulfa Antibiotics Nausea and Vomiting, Nausea and GI Disturbance         Do you need any medication refills at today's visit? No    Rox Donis MA

## 2025-02-19 NOTE — PROGRESS NOTES
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  Patient has no other skin complaints today.  Remainder of the HPI, Meds, PMH, Allergies, FH, and SH was reviewed in chart.      Past Medical History:   Diagnosis Date    Fatty liver     Gastroesophageal reflux disease with esophagitis     HTN (hypertension)     Hyperlipidemia     on statin therapy    PONV (postoperative nausea and vomiting)     Sleep apnea     on cpap       Past Surgical History:   Procedure Laterality Date    ESOPHAGEAL BALLOON PROVOCATION STUDY N/A 2021    Procedure: Esophageal Balloon Provocation Study;  Surgeon: Jhon Chavez DO;  Location: [facility]    TONSILLECTOMY ADULT Bilateral 2019    Procedure: Bilateral Tonsillectomy;  Surgeon: Evita Hampton MD;  Location: [facility]    TURBINECTOMY Bilateral 2019    Procedure: Turbinectomy;  Surgeon: Evita Hampton MD;  Location: [facility]    UVULOPALATOPHARYNGOPLASTY N/A 2019    Procedure: Uvulopalatopharyngoplasty, bilateral tonsilectomy and bilateral inferior turbinoplasty;  Surgeon: Evita Hampton MD;  Location: [facility]        Family History   Problem Relation Age of Onset    Kidney Disease Mother     Thyroid Disease Mother     Lung Cancer Father     Emphysema Father        Social History     Socioeconomic History    Marital status:      Spouse name: Not on file    Number of children: Not on file    Years of education: Not on file    Highest education level: Not on file   Occupational History    Not on file   Tobacco Use    Smoking status: Former     Current packs/day: 0.00     Average packs/day: 0.1 packs/day for 10.0 years (1.0 ttl pk-yrs)     Types: Cigarettes     Start date:      Quit date: 2014     Years since quittin.1    Smokeless tobacco: Never   Vaping Use    Vaping status: Never Used   Substance and Sexual Activity    Alcohol use: Not  Currently     Comment: occationally    Drug use: Never    Sexual activity: Yes     Birth control/protection: Post-menopausal   Other Topics Concern    Parent/sibling w/ CABG, MI or angioplasty before 65F 55M? Not Asked   Social History Narrative    Not on file     Social Drivers of Health     Financial Resource Strain: High Risk (1/9/2024)    Received from [facility], [facility]    Overall Financial Resource Strain (CARDIA)     How hard is it for you to pay for the very basics like food, housing, medical care, and heating?: Hard   Food Insecurity: No Food Insecurity (8/10/2024)    Received from [facility]    Hunger Vital Sign     Worried About Running Out of Food in the Last Year: Never true     Ran Out of Food in the Last Year: Never true   Transportation Needs: No Transportation Needs (8/10/2024)    Received from [facility]    PRAPARE - Transportation     Lack of Transportation (Medical): No     Lack of Transportation (Non-Medical): No   Physical Activity: Insufficiently Active (1/9/2024)    Received from [facility], [facility]    Exercise Vital Sign     Days of Exercise per Week: 3 days     Minutes of Exercise per Session: 30 min   Stress: Stress Concern Present (1/9/2024)    Received from [facility], [facility]    [place] of Occupational Health - Occupational Stress Questionnaire     Do you feel stress - tense, restless, nervous, or anxious, or unable to sleep at night because your mind is troubled all the time - these days?: Rather much   Social Connections: Socially Isolated (1/9/2024)    Received from [facility], [facility]    Social Connection and Isolation Panel [NHANES]     Frequency of Communication with Friends and Family: Three times a week     Frequency of Social  Gatherings with Friends and Family: Once a week     Attends Gnosticist Services: Never     Active Member of Clubs or Organizations: No     Attends Club or Organization Meetings: Patient declined     Marital Status:    Interpersonal Safety: Unknown (8/10/2024)    Received from [facility]    Humiliation, Afraid, Rape, and Kick questionnaire     Fear of Current or Ex-Partner: Not on file     Emotionally Abused: No     Physically Abused: No     Sexually Abused: No   Housing Stability: Unknown (8/10/2024)    Received from [facility]    Housing Stability Vital Sign     Unable to Pay for Housing in the Last Year: No     Number of Places Lived in the Last Year: Not on file     Unstable Housing in the Last Year: No       Outpatient Encounter Medications as of 2/19/2025   Medication Sig Dispense Refill    albuterol (PROAIR HFA/PROVENTIL HFA/VENTOLIN HFA) 108 (90 Base) MCG/ACT inhaler Inhale 2 puffs into the lungs      amLODIPine (NORVASC) 5 MG tablet Take 5 mg by mouth daily      augmented betamethasone dipropionate (DIPROLENE AF) 0.05 % external cream Apply sparingly to affected area twice daily as needed.  Do not apply to face. 300 g 3    desoximetasone (TOPICORT) 0.25 % OINT ointment Apply topically 2 times daily. 100 g 1    DULoxetine (CYMBALTA) 60 MG capsule Take 120 mg by mouth      hydrOXYzine HCl (ATARAX) 50 MG tablet Take 25-50 mg by mouth      losartan (COZAAR) 100 MG tablet Take 100 mg by mouth daily      omeprazole (PRILOSEC) 40 MG DR capsule Take 1 capsule by mouth 2 times daily      oxyCODONE (ROXICODONE) 5 MG tablet       rosuvastatin (CRESTOR) 10 MG tablet TAKE 1 TABLET BY MOUTH ONCE DAILY WITH A MEAL      TRAZODONE HCL PO Take 100 mg by mouth At Bedtime       COMPOUNDED NON-CONTROLLED SUBSTANCE (CMPD RX) - PHARMACY TO MIX COMPOUNDED MEDICATION Start compounded semaglutide: 0.25mg/week for 2 weeks then increase to 0.5mg/week for 4 weeks then increase to 1mg/week and stay at that dose if  tolerated. (Patient not taking: Reported on 2/19/2025) 1 mL 4    disulfiram (ANTABUSE) 250 MG tablet Take 500 mg by mouth (Patient not taking: Reported on 1/30/2025)      doxycycline hyclate (VIBRA-TABS) 100 MG tablet       ondansetron (ZOFRAN ODT) 4 MG ODT tab Place 1 tablet every 4 hours by translingual route as needed.      prednisoLONE acetate (PRED FORTE) 1 % ophthalmic suspension       tiZANidine (ZANAFLEX) 2 MG tablet Take 2 mg by mouth       No facility-administered encounter medications on file as of 2/19/2025.             O:   NAD, WDWN, Alert & Oriented, Mood & Affect wnl, Vitals stable   General appearance normal   Vitals stable   Alert, oriented and in no acute distress     Mid back 5mm red scaly papule       Eyes: Conjunctivae/lids:Normal     ENT: Lips, mucosa: normal    MSK:Normal    Cardiovascular: peripheral edema none    Pulm: Breathing Normal    Neuro/Psych: Orientation:Alert and Orientedx3 ; Mood/Affect:normal       MICRO:   Mid back:Unremarkable epidermis, dermis and superficial subcutis.  No concerning areas for malignancy.    Mart1 negative   A/P:  Atypical nevus  Atypical moles are unusual benign moles that may resemble melanoma. People who have them are at increased risk of developing single or multiple melanomas. The higher the number of these moles someone has, the higher the risk; those who have 10 or more have 12 times the risk of developing melanoma compared to the general population. Atypical nevi are found significantly more often in melanoma patients than in the general population.    While atypical moles are considered to be pre-cancerous (more likely to turn into melanoma than regular moles), not everyone who has atypical moles gets melanoma. In fact, most moles -- both ordinary and atypical ones -- never become cancerous. Thus the removal of all atypical nevi is unnecessary. In fact, most of the melanomas found on people with atypical moles arise from normal skin and not an  "atypical mole.    Although a physician bases the initial diagnosis of atypical moles on a physical examination, removing several moles and examining them under a microscope must confirm the diagnosis. This procedure, is called a biopsy.    A pathologist will examine the tissue under a microscope and make the precise diagnosis. Diagnosis by biopsy is not exact, and in difficult cases doctors may split 50/50 down the middle as to whether a mole is melanoma or benign. If the pathologist uses the term \"severely dysplastic\" or \"atypical melanocytic hyperplasia\" or offers a long descriptive narrative it means he really is concerned about melanoma, but does not want to call it that.    Most dermatologists usually recommend that all patients with these severely dysplastic moles have them removed. Also many dermatologists recommend removing \"moderate dysplasia\" moles, if the biopsy didn't get all of it. Those with \"mild dysplasia\" can usually be left alone or watched.    Mid back AN  EXCISION OF Atypical nevus, Margins confirmed with FROZEN SECTIONS, MART1 stain AND Second intent: After thorough discussion of [facility], consent obtained, anesthesia and prep, the margins of the lesion were identified and an incision was made encompassing the lesion with 3mm margin. The incisions were made through the skin and down to and including the superficial subcutis.  The lesion was removed en bloc and submitted for frozen section pathologic review. Clear margins obtained (1.1cm).  MART1 stain performed one 1 section(s).    REPAIR SECOND INTENT: We discussed the options for wound management in full with the patient including risks/benefits/possible outcomes. Decision made to allow the wound to heal by second intention. EBL minimal; complications none; wound care routine.  The patient was discharged in good condition and will return in one month or prn for wound evaluation.      It was a pleasure speaking to Tessie Mcdaniels today.  Previous " clinic notes and pertinent laboratory tests were reviewed prior to Tessie Mcdaniels's visit.  Patient encouraged to perform monthly skin exams.  UV precautions reviewed with patient.  Return to clinic 6 months

## 2025-02-19 NOTE — PATIENT INSTRUCTIONS
Open Wound Care     for back        No strenuous activity for 48 hours    Take Tylenol as needed for discomfort.                                                .         Do not drink alcoholic beverages for 48 hours.    Keep the pressure bandage in place for 24 hours. If the bandage becomes blood tinged or loose, reinforce it with gauze and tape.        (Refer to the reverse side of this page for management of bleeding).    Remove bandage in 24 hours and begin wound care as follows:     Clean area with tap water using a Q tip or gauze pad, (shower / bathe normally)  Dry wound with Q tip or gauze pad  Apply Aquaphor, Vaseline, Polysporin or Bacitracin Ointment with a Q tip  Do NOT use Neosporin Ointment *  Cover the wound with a band-aid or nonstick gauze pad and paper tape.  Repeat wound care once a day until wound is completely healed.    It is an old wives tale that a wound heals better when it is exposed to air and allowed to dry out. The wound will heal faster with a better cosmetic result if it is kept moist with ointment and covered with a bandage.  Do not let the wound dry out.      Supplies Needed:                Qtips or gauze pads                Polysporin or Bacitracin Ointment                Bandaids or nonstick gauze pads and paper tape    Wound care kits and brown paper tape are available for purchase at   the pharmacy.    BLEEDING:    Use tightly rolled up gauze or cloth to apply direct pressure over the bandage for 20   minutes.  Reapply pressure for an additional 20 minutes if necessary  Call the office or go to the nearest emergency room if pressure fails to stop the bleeding.  Use additional gauze and tape to maintain pressure once the bleeding has stopped.  Begin wound care 24 hours after surgery as directed.                  WOUND HEALING    One week after surgery a pink / red halo will form around the outside of the wound.   This is new skin.  The center of the wound will appear yellowish  white and produce some drainage.  The pink halo will slowly migrate in toward the center of the wound until the wound is covered with new shiny pink skin.  There will be no more drainage when the wound is completely healed.  It will take six months to one year for the redness to fade.  The scar may be itchy, tight and sensitive to extreme temperatures for a year after the surgery.  Massaging the area several times a day for several minutes after the wound is completely healed will help the scar soften and normalize faster. Begin massage only after healing is complete.      In case of emergency call: Dr Tolbert: [phone]    [facility]: [phone]    [facility]:[phone]     Proper skin care from [facility]:    -Eliminate harsh soaps as they strip the natural oils from the skin, often resulting in dry itchy skin ( i.e. Dial, Zest, Sandra Spring)  -Use mild soaps such as Cetaphil or Dove Sensitive Skin in the shower. You do not need to use soap on arms, legs, and trunk every time you shower unless visibly soiled.   -Avoid hot or cold showers.  -After showering, lightly dry off and apply moisturizing within 2-3 minutes. This will help trap moisture in the skin.   -Aggressive use of a moisturizer at least 1-2 times a day to the entire body (including -Vanicream, Cetaphil, Aquaphor or Cerave) and moisturize hands after every washing.  -We recommend using moisturizers that come in a tub that needs to be scooped out, not a pump. This has more of an oil base. It will hold moisture in your skin much better than a water base moisturizer. The above recommended are non-pore clogging.      Wear a sunscreen with at least SPF 30 on your face, ears, neck and V of the chest daily. Wear sunscreen on other areas of the body if those areas are exposed to the sun throughout the day. Sunscreens can contain physical and/or chemical blockers. Physical blockers are less likely to clog pores, these  include zinc oxide and titanium dioxide. Reapply every two hour and after swimming.     Sunscreen examples: https://www.ewg.org/sunscreen/    UV radiation  UVA radiation remains constant throughout the day and throughout the year. It is a longer wavelength than UVB and therefore penetrates deeper into the skin leading to immediate and delayed tanning, photoaging, and skin cancer. 70-80% of UVA and UVB radiation occurs between the hours of 10am-2pm.  UVB radiation  UVB radiation causes the most harmful effects and is more significant during the summer months. However, snow and ice can reflect UVB radiation leading to skin damage during the winter months as well. UVB radiation is responsible for tanning, burning, inflammation, delayed erythema (pinkness), pigmentation (brown spots), and skin cancer.     I recommend self monthly full body exams and yearly full body exams with a dermatology provider. If you develop a new or changing lesion please follow up for examination. Most skin cancers are pink and scaly or pink and pearly. However, we do see blue/brown/black skin cancers.  Consider the ABCDEs of melanoma when giving yourself your monthly full body exam ( don't forget the groin, buttocks, feet, toes, etc). A-asymmetry, B-borders, C-color, D-diameter, E-elevation or evolving. If you see any of these changes please follow up in clinic. If you cannot see your back I recommend purchasing a hand held mirror to use with a larger wall mirror.       Checking for Skin Cancer  You can find cancer early by checking your skin each month. There are 3 kinds of skin cancer. They are melanoma, basal cell carcinoma, and squamous cell carcinoma. Doing monthly skin checks is the best way to find new marks or skin changes. Follow the instructions below for checking your skin.   The ABCDEs of checking moles for melanoma   Check your moles or growths for signs of melanoma using ABCDE:   Asymmetry: the sides of the mole or growth don t  match  Border: the edges are ragged, notched, or blurred  Color: the color within the mole or growth varies  Diameter: the mole or growth is larger than 6 mm (size of a pencil eraser)  Evolving: the size, shape, or color of the mole or growth is changing (evolving is not shown in the images below)    Checking for other types of skin cancer  Basal cell carcinoma or squamous cell carcinoma have symptoms such as:     A spot or mole that looks different from all other marks on your skin  Changes in how an area feels, such as itching, tenderness, or pain  Changes in the skin's surface, such as oozing, bleeding, or scaliness  A sore that does not heal  New swelling or redness beyond the border of a mole    Who s at risk?  Anyone can get skin cancer. But you are at greater risk if you have:   Fair skin, light-colored hair, or light-colored eyes  Many moles or abnormal moles on your skin  A history of sunburns from sunlight or tanning beds  A family history of skin cancer  A history of exposure to radiation or chemicals  A weakened immune system  If you have had skin cancer in the past, you are at risk for recurring skin cancer.   How to check your skin  Do your monthly skin checkups in front of a full-length mirror. Check all parts of your body, including your:   Head (ears, face, neck, and scalp)  Torso (front, back, and sides)  Arms (tops, undersides, upper, and lower armpits)  Hands (palms, backs, and fingers, including under the nails)  Buttocks and genitals  Legs (front, back, and sides)  Feet (tops, soles, toes, including under the nails, and between toes)  If you have a lot of moles, take digital photos of them each month. Make sure to take photos both up close and from a distance. These can help you see if any moles change over time.   Most skin changes are not cancer. But if you see any changes in your skin, call your doctor right away. Only he or she can diagnose a problem. If you have skin cancer, seeing your  doctor can be the first step toward getting the treatment that could save your life.   [employer] last reviewed this educational content on 4/1/2019 2000-2020 [employer]. [address] [place] [address], [place] [address]. All rights reserved. This information is not intended as a substitute for professional medical care. Always follow your healthcare professional's instructions.       When should I call my doctor?  If you are worsening or not improving, please, contact us or seek urgent care as noted below.     Who should I call with questions (adults)?    [facility] [phone]  For urgent needs outside of business hours call the [facility] at [phone] and ask for the dermatology resident on call to be paged  If this is a medical emergency and you are unable to reach an ER, Call [phone]      If you need a prescription refill, please contact your pharmacy. Refills are approved or denied by our Physicians during normal business hours, Monday through Friday.  Per office policy, refills will not be granted if you have not been seen within the past year (or sooner depending on the condition).

## 2025-03-02 ENCOUNTER — HEALTH MAINTENANCE LETTER (OUTPATIENT)
Age: 60
End: 2025-03-02

## 2025-06-21 ENCOUNTER — HEALTH MAINTENANCE LETTER (OUTPATIENT)
Age: 60
End: 2025-06-21

## 2025-06-23 ENCOUNTER — TELEPHONE (OUTPATIENT)
Dept: VASCULAR SURGERY | Facility: CLINIC | Age: 60
End: 2025-06-23
Payer: COMMERCIAL

## 2025-06-23 DIAGNOSIS — I83.813 VARICOSE VEINS OF BILATERAL LOWER EXTREMITIES WITH PAIN: Primary | ICD-10-CM

## 2025-06-23 NOTE — TELEPHONE ENCOUNTER
Bilateral US ordered per protocol.     Consult may need to be changed to correlate to 90 min US appt.  Routing to scheduling to call back to get scheduled.

## 2025-06-23 NOTE — TELEPHONE ENCOUNTER
Patient is scheduled on 6/30 with TJ[facility] for a consult. She is coming from [place], [place] which is over 60 miles from our clinic. Should she have an US scheduled as well?    Nuzhat Donaldson,   [facility]

## 2025-06-23 NOTE — TELEPHONE ENCOUNTER
Pt scheduled US @ 1:00 on 6/30. Patient is aware of gap between US and results appointment and is ok with that. MH

## 2025-06-30 ENCOUNTER — ANCILLARY PROCEDURE (OUTPATIENT)
Dept: ULTRASOUND IMAGING | Facility: CLINIC | Age: 60
End: 2025-06-30
Attending: SURGERY
Payer: COMMERCIAL

## 2025-06-30 ENCOUNTER — OFFICE VISIT (OUTPATIENT)
Dept: VASCULAR SURGERY | Facility: CLINIC | Age: 60
End: 2025-06-30
Payer: COMMERCIAL

## 2025-06-30 DIAGNOSIS — I83.811 VARICOSE VEINS OF LEG WITH PAIN, RIGHT: Primary | ICD-10-CM

## 2025-06-30 DIAGNOSIS — I83.813 VARICOSE VEINS OF BILATERAL LOWER EXTREMITIES WITH PAIN: ICD-10-CM

## 2025-06-30 DIAGNOSIS — E66.09 OBESITY DUE TO EXCESS CALORIES, UNSPECIFIED CLASS, UNSPECIFIED WHETHER SERIOUS COMORBIDITY PRESENT: ICD-10-CM

## 2025-06-30 PROCEDURE — 99203 OFFICE O/P NEW LOW 30 MIN: CPT | Performed by: SURGERY

## 2025-06-30 PROCEDURE — 93970 EXTREMITY STUDY: CPT | Performed by: SURGERY

## 2025-06-30 RX ORDER — ALPRAZOLAM 0.25 MG
TABLET ORAL
COMMUNITY
Start: 2025-01-30

## 2025-06-30 ASSESSMENT — ENCOUNTER SYMPTOMS
GASTROINTESTINAL NEGATIVE: 1
HEMATOLOGIC/LYMPHATIC NEGATIVE: 1
DEPRESSION: 1
EYES NEGATIVE: 1
NERVOUS/ANXIOUS: 1
NIGHT SWEATS: 1
MUSCLE CRAMPS: 1
FREQUENCY: 1
BACK PAIN: 1
WEIGHT GAIN: 1
NEUROLOGICAL NEGATIVE: 1
COUGH: 1

## 2025-06-30 NOTE — PROGRESS NOTES
VEIN SOLUTIONS CONSULT    Impression:  1.  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    2.  Chronic right knee pain with probable medial meniscal tear.    3.  Obesity    4.  Status post unknown ablation procedure of the bilateral greater saphenous veins of the thighs 15 years ago.    Plan:   I had a detailed conversation with Tessie reviewing all the above.  She clearly has a significant varicosity coursing across the right knee.  I suspect that a good part of her pain is coming from her right medial meniscus tear.  She is still undergoing evaluation for that condition.  I do feel that she would benefit from radiofrequency ablation of the incompetent  just above the right medial knee crease with medically necessary stab phlebectomies of the nearly 7 mm varicosity coursing down across the right knee.  I thoroughly explained the specifics of that procedure including potential complications and the anticipated postprocedural course.  She verbalizes full understanding to the above and wishes to proceed.    Total length of this encounter was 30 minutes with time spent reviewing records, interviewing and examining the patient, answering questions, and coordinating a treatment plan.        HPI:   Tessie Mcdaniels is a 60-year-old female self-referred for evaluation of symptomatic bilateral lower extremity varicosities (right greater than left).  She describes point tenderness on the medial aspect of her distal right thigh exactly corresponding to the incompetent 5.7 mm  vein.  She complains of moderate pain and aching in that leg worsened by the end of the day.  Her mother had significant venous disease.  The patient had 1 prior pregnancy at age 14.  She has worn knee-high compression stockings with a great deal of difficulty as the elastic band  typically compresses on the large varicosities coursing over her knee.  She remains symptomatic despite compression and I would consider her to be a failure of conservative therapy.  Her past medical history is most notable for prior bilateral greater saphenous vein ablations performed by a surgeon in [place] some 15 years ago.      CURRENT MEDICATIONS  Current Outpatient Medications   Medication Sig Dispense Refill    ALPRAZolam (XANAX) 0.25 MG tablet Take 1 tablet up to twice daily if needed for severe anxiety.      amLODIPine (NORVASC) 5 MG tablet Take 5 mg by mouth daily      augmented betamethasone dipropionate (DIPROLENE AF) 0.05 % external cream Apply sparingly to affected area twice daily as needed.  Do not apply to face. 300 g 3    DULoxetine (CYMBALTA) 60 MG capsule Take 120 mg by mouth      hydrOXYzine HCl (ATARAX) 50 MG tablet Take 25-50 mg by mouth      losartan (COZAAR) 100 MG tablet Take 100 mg by mouth daily      omeprazole (PRILOSEC) 40 MG DR capsule Take 1 capsule by mouth 2 times daily      rosuvastatin (CRESTOR) 10 MG tablet TAKE 1 TABLET BY MOUTH ONCE DAILY WITH A MEAL      albuterol (PROAIR HFA/PROVENTIL HFA/VENTOLIN HFA) 108 (90 Base) MCG/ACT inhaler Inhale 2 puffs into the lungs (Patient not taking: Reported on 6/30/2025)      COMPOUNDED NON-CONTROLLED SUBSTANCE (CMPD RX) - PHARMACY TO MIX COMPOUNDED MEDICATION Compounded Semaglutide: 0.25mg subcutaneous every 7 days. 1 mL 0    COMPOUNDED NON-CONTROLLED SUBSTANCE (CMPD RX) - PHARMACY TO MIX COMPOUNDED MEDICATION Compounded Semaglutide: 0.5mg subcutaneous every 7 days. 1 mL 0    COMPOUNDED NON-CONTROLLED SUBSTANCE (CMPD RX) - PHARMACY TO MIX COMPOUNDED MEDICATION Compounded Semaglutide: 1 mg subcutaneous every 7 days. 1 mL 3    desoximetasone (TOPICORT) 0.25 % OINT ointment Apply topically 2 times daily. (Patient not taking: Reported on 6/30/2025) 100 g 1    disulfiram (ANTABUSE) 250 MG tablet Take 500 mg by mouth (Patient not taking:  Reported on 1/30/2025)      doxycycline hyclate (VIBRA-TABS) 100 MG tablet       ondansetron (ZOFRAN ODT) 4 MG ODT tab Place 1 tablet every 4 hours by translingual route as needed.      oxyCODONE (ROXICODONE) 5 MG tablet  (Patient not taking: Reported on 6/30/2025)      prednisoLONE acetate (PRED FORTE) 1 % ophthalmic suspension       tiZANidine (ZANAFLEX) 2 MG tablet Take 2 mg by mouth      TRAZODONE HCL PO Take 100 mg by mouth At Bedtime  (Patient not taking: Reported on 6/30/2025)       No current facility-administered medications for this visit.         PAST MEDICAL HISTORY  Past Medical History:   Diagnosis Date    Fatty liver     Gastroesophageal reflux disease with esophagitis     HTN (hypertension)     Hyperlipidemia     on statin therapy    PONV (postoperative nausea and vomiting)     Sleep apnea     on cpap         PAST SURGICAL HISTORY:  Past Surgical History:   Procedure Laterality Date    ESOPHAGEAL BALLOON PROVOCATION STUDY N/A 9/14/2021    Procedure: Esophageal Balloon Provocation Study;  Surgeon: Jhon Chavez DO;  Location:  GI    TONSILLECTOMY ADULT Bilateral 11/13/2019    Procedure: Bilateral Tonsillectomy;  Surgeon: Evita Hampton MD;  Location:  OR    TURBINECTOMY Bilateral 11/13/2019    Procedure: Turbinectomy;  Surgeon: Evita Hampton MD;  Location:  OR    UVULOPALATOPHARYNGOPLASTY N/A 11/13/2019    Procedure: Uvulopalatopharyngoplasty, bilateral tonsilectomy and bilateral inferior turbinoplasty;  Surgeon: Evita Hampton MD;  Location:  OR       ALLERGIES     Allergies   Allergen Reactions    Diflucan [Fluconazole]      Mouth sores    Sulfa Antibiotics Nausea and Vomiting, Nausea and GI Disturbance       FAMILY HISTORY  Family History   Problem Relation Age of Onset    Kidney Disease Mother     Thyroid Disease Mother     Lung Cancer Father     Emphysema Father        SOCIAL HISTORY  Social History     Tobacco Use    Smoking status: Former     Current  packs/day: 0.00     Average packs/day: 0.1 packs/day for 10.0 years (1.0 ttl pk-yrs)     Types: Cigarettes     Start date:      Quit date:      Years since quittin.5    Smokeless tobacco: Never   Vaping Use    Vaping status: Never Used   Substance Use Topics    Alcohol use: Not Currently     Comment: occationally    Drug use: Never       ROS:   Review of Systems   Constitutional: Positive for malaise/fatigue, night sweats and weight gain.   HENT:  Positive for hearing loss.    Eyes: Negative.    Cardiovascular:  Positive for leg swelling.   Respiratory:  Positive for cough.    Endocrine:        Hot flashes.  Postmenopausal.   Hematologic/Lymphatic: Negative.    Skin:  Positive for rash.   Musculoskeletal:  Positive for arthritis, back pain, joint pain, muscle cramps and muscle weakness.   Gastrointestinal: Negative.    Genitourinary:  Positive for frequency.   Neurological: Negative.    Psychiatric/Behavioral:  Positive for depression. The patient is nervous/anxious.         Panic attacks.         EXAM:  There were no vitals taken for this visit.  Physical Exam  Constitutional:       Appearance: She is obese.   HENT:      Head: Normocephalic and atraumatic.   Eyes:      General: No scleral icterus.     Extraocular Movements: Extraocular movements intact.      Pupils: Pupils are equal, round, and reactive to light.   Cardiovascular:      Pulses:           Posterior tibial pulses are 2+ on the right side and 2+ on the left side.      Comments: Large varicosity beginning on the medial aspect of the distal right thigh coursing across the knee and onto the pretibial region.  Musculoskeletal:         General: Normal range of motion.      Cervical back: Normal range of motion.      Right lower leg: Edema present.   Skin:     General: Skin is warm and dry.   Neurological:      General: No focal deficit present.      Mental Status: She is alert and oriented to person, place, and time. Mental status is at  "baseline.   Psychiatric:         Mood and Affect: Mood normal.         Behavior: Behavior normal.         Thought Content: Thought content normal.         Judgment: Judgment normal.           Right leg varicosity originating from a 5.7 mm  located just above the right medial knee crease.  Varicosity courses across the right knee.    Labs:  LIPID RESULTS:  No results found for: \"CHOL\", \"HDL\", \"LDL\", \"TRIG\", \"CHOLHDLRATIO\"    CBC RESULTS:  Lab Results   Component Value Date    WBC 9.6 01/31/2024    RBC 4.40 01/31/2024    HGB 13.3 01/31/2024    HCT 39.4 01/31/2024    MCV 90 01/31/2024    MCH 30.2 01/31/2024    MCHC 33.8 01/31/2024    RDW 12.2 01/31/2024     01/31/2024       BMP RESULTS:  Lab Results   Component Value Date     01/31/2024    POTASSIUM 4.3 01/31/2024    CHLORIDE 102 01/31/2024    CO2 25 01/31/2024    ANIONGAP 11 01/31/2024     (H) 01/31/2024    BUN 20.3 01/31/2024    CR 0.76 01/31/2024    GFRESTIMATED 89 01/31/2024    SAIDA 9.6 01/31/2024        A1C RESULTS:  Lab Results   Component Value Date    A1C 5.3 01/31/2024         Imaging:  Results of bilateral venous competency studies performed today:  RIGHT LEG:  No clinically significant deep venous pathology.  The right GSV has been ablated throughout the thigh.  The remnant GSV of the proximal to mid calf is incompetent.  No other clinically significant right leg superficial axial venous incompetence.  There is an incompetent  vein (5.7 mm) 4 cm cephalad to the medial knee crease communicating with a 6.5 mm varicosity.    LEFT LEG:  No clinically significant deep venous pathology.  The left greater saphenous vein is ablated throughout the thigh.  Limited incompetence of the remnant left GSV in the proximal calf and in the distal calf.  Limited incompetence of a small vein of Giacomini which is not clinically significant.  Pelvic source vein measuring up to 3.9 mm coursing down the lateral thigh joining the SSV at the " saphenofemoral junction.      Jeff Myers MD

## 2025-06-30 NOTE — LETTER
6/30/2025      Tessie Mcdaniels  [address] [place] [address]      Dear Colleague,    Thank you for referring your patient, Tessie Mcdaniels, to the [facility] [place]. Please see a copy of my visit note below.    VEIN SOLUTIONS CONSULT    Impression:  1.  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    2.  Chronic right knee pain with probable medial meniscal tear.    3.  Obesity    4.  Status post unknown ablation procedure of the bilateral greater saphenous veins of the thighs 15 years ago.    Plan:   I had a detailed conversation with Tessie reviewing all the above.  She clearly has a significant varicosity coursing across the right knee.  I suspect that a good part of her pain is coming from her right medial meniscus tear.  She is still undergoing evaluation for that condition.  I do feel that she would benefit from radiofrequency ablation of the incompetent  just above the right medial knee crease with medically necessary stab phlebectomies of the nearly 7 mm varicosity coursing down across the right knee.  I thoroughly explained the specifics of that procedure including potential complications and the anticipated postprocedural course.  She verbalizes full understanding to the above and wishes to proceed.    Total length of this encounter was 30 minutes with time spent reviewing records, interviewing and examining the patient, answering questions, and coordinating a treatment plan.        HPI:   Tessie Mcdaniels is a 60-year-old female self-referred for evaluation of symptomatic bilateral lower extremity varicosities (right greater than left).  She describes point tenderness on the medial aspect of her distal right thigh exactly corresponding to the incompetent 5.7 mm  vein.  She complains of  moderate pain and aching in that leg worsened by the end of the day.  Her mother had significant venous disease.  The patient had 1 prior pregnancy at age 14.  She has worn knee-high compression stockings with a great deal of difficulty as the elastic band typically compresses on the large varicosities coursing over her knee.  She remains symptomatic despite compression and I would consider her to be a failure of conservative therapy.  Her past medical history is most notable for prior bilateral greater saphenous vein ablations performed by a surgeon in [place] some 15 years ago.      CURRENT MEDICATIONS  Current Outpatient Medications   Medication Sig Dispense Refill     ALPRAZolam (XANAX) 0.25 MG tablet Take 1 tablet up to twice daily if needed for severe anxiety.       amLODIPine (NORVASC) 5 MG tablet Take 5 mg by mouth daily       augmented betamethasone dipropionate (DIPROLENE AF) 0.05 % external cream Apply sparingly to affected area twice daily as needed.  Do not apply to face. 300 g 3     DULoxetine (CYMBALTA) 60 MG capsule Take 120 mg by mouth       hydrOXYzine HCl (ATARAX) 50 MG tablet Take 25-50 mg by mouth       losartan (COZAAR) 100 MG tablet Take 100 mg by mouth daily       omeprazole (PRILOSEC) 40 MG DR capsule Take 1 capsule by mouth 2 times daily       rosuvastatin (CRESTOR) 10 MG tablet TAKE 1 TABLET BY MOUTH ONCE DAILY WITH A MEAL       albuterol (PROAIR HFA/PROVENTIL HFA/VENTOLIN HFA) 108 (90 Base) MCG/ACT inhaler Inhale 2 puffs into the lungs (Patient not taking: Reported on 6/30/2025)       COMPOUNDED NON-CONTROLLED SUBSTANCE (CMPD RX) - PHARMACY TO MIX COMPOUNDED MEDICATION Compounded Semaglutide: 0.25mg subcutaneous every 7 days. 1 mL 0     COMPOUNDED NON-CONTROLLED SUBSTANCE (CMPD RX) - PHARMACY TO MIX COMPOUNDED MEDICATION Compounded Semaglutide: 0.5mg subcutaneous every 7 days. 1 mL 0     COMPOUNDED NON-CONTROLLED SUBSTANCE (CMPD RX) - PHARMACY TO MIX COMPOUNDED MEDICATION  Compounded Semaglutide: 1 mg subcutaneous every 7 days. 1 mL 3     desoximetasone (TOPICORT) 0.25 % OINT ointment Apply topically 2 times daily. (Patient not taking: Reported on 6/30/2025) 100 g 1     disulfiram (ANTABUSE) 250 MG tablet Take 500 mg by mouth (Patient not taking: Reported on 1/30/2025)       doxycycline hyclate (VIBRA-TABS) 100 MG tablet        ondansetron (ZOFRAN ODT) 4 MG ODT tab Place 1 tablet every 4 hours by translingual route as needed.       oxyCODONE (ROXICODONE) 5 MG tablet  (Patient not taking: Reported on 6/30/2025)       prednisoLONE acetate (PRED FORTE) 1 % ophthalmic suspension        tiZANidine (ZANAFLEX) 2 MG tablet Take 2 mg by mouth       TRAZODONE HCL PO Take 100 mg by mouth At Bedtime  (Patient not taking: Reported on 6/30/2025)       No current facility-administered medications for this visit.         PAST MEDICAL HISTORY  Past Medical History:   Diagnosis Date     Fatty liver      Gastroesophageal reflux disease with esophagitis      HTN (hypertension)      Hyperlipidemia     on statin therapy     PONV (postoperative nausea and vomiting)      Sleep apnea     on cpap         PAST SURGICAL HISTORY:  Past Surgical History:   Procedure Laterality Date     ESOPHAGEAL BALLOON PROVOCATION STUDY N/A 9/14/2021    Procedure: Esophageal Balloon Provocation Study;  Surgeon: Jhon Chavez DO;  Location:  GI     TONSILLECTOMY ADULT Bilateral 11/13/2019    Procedure: Bilateral Tonsillectomy;  Surgeon: Evita Hampton MD;  Location:  OR     TURBINECTOMY Bilateral 11/13/2019    Procedure: Turbinectomy;  Surgeon: Evita Hampton MD;  Location:  OR     UVULOPALATOPHARYNGOPLASTY N/A 11/13/2019    Procedure: Uvulopalatopharyngoplasty, bilateral tonsilectomy and bilateral inferior turbinoplasty;  Surgeon: Evita Hampton MD;  Location:  OR       ALLERGIES     Allergies   Allergen Reactions     Diflucan [Fluconazole]      Mouth sores     Sulfa Antibiotics Nausea  and Vomiting, Nausea and GI Disturbance       FAMILY HISTORY  Family History   Problem Relation Age of Onset     Kidney Disease Mother      Thyroid Disease Mother      Lung Cancer Father      Emphysema Father        SOCIAL HISTORY  Social History     Tobacco Use     Smoking status: Former     Current packs/day: 0.00     Average packs/day: 0.1 packs/day for 10.0 years (1.0 ttl pk-yrs)     Types: Cigarettes     Start date:      Quit date:      Years since quittin.5     Smokeless tobacco: Never   Vaping Use     Vaping status: Never Used   Substance Use Topics     Alcohol use: Not Currently     Comment: occationally     Drug use: Never       ROS:   Review of Systems   Constitutional: Positive for malaise/fatigue, night sweats and weight gain.   HENT:  Positive for hearing loss.    Eyes: Negative.    Cardiovascular:  Positive for leg swelling.   Respiratory:  Positive for cough.    Endocrine:        Hot flashes.  Postmenopausal.   Hematologic/Lymphatic: Negative.    Skin:  Positive for rash.   Musculoskeletal:  Positive for arthritis, back pain, joint pain, muscle cramps and muscle weakness.   Gastrointestinal: Negative.    Genitourinary:  Positive for frequency.   Neurological: Negative.    Psychiatric/Behavioral:  Positive for depression. The patient is nervous/anxious.         Panic attacks.         EXAM:  There were no vitals taken for this visit.  Physical Exam  Constitutional:       Appearance: She is obese.   HENT:      Head: Normocephalic and atraumatic.   Eyes:      General: No scleral icterus.     Extraocular Movements: Extraocular movements intact.      Pupils: Pupils are equal, round, and reactive to light.   Cardiovascular:      Pulses:           Posterior tibial pulses are 2+ on the right side and 2+ on the left side.      Comments: Large varicosity beginning on the medial aspect of the distal right thigh coursing across the knee and onto the pretibial region.  Musculoskeletal:         General:  "Normal range of motion.      Cervical back: Normal range of motion.      Right lower leg: Edema present.   Skin:     General: Skin is warm and dry.   Neurological:      General: No focal deficit present.      Mental Status: She is alert and oriented to person, place, and time. Mental status is at baseline.   Psychiatric:         Mood and Affect: Mood normal.         Behavior: Behavior normal.         Thought Content: Thought content normal.         Judgment: Judgment normal.           Right leg varicosity originating from a 5.7 mm  located just above the right medial knee crease.  Varicosity courses across the right knee.    Labs:  LIPID RESULTS:  No results found for: \"CHOL\", \"HDL\", \"LDL\", \"TRIG\", \"CHOLHDLRATIO\"    CBC RESULTS:  Lab Results   Component Value Date    WBC 9.6 01/31/2024    RBC 4.40 01/31/2024    HGB 13.3 01/31/2024    HCT 39.4 01/31/2024    MCV 90 01/31/2024    MCH 30.2 01/31/2024    MCHC 33.8 01/31/2024    RDW 12.2 01/31/2024     01/31/2024       BMP RESULTS:  Lab Results   Component Value Date     01/31/2024    POTASSIUM 4.3 01/31/2024    CHLORIDE 102 01/31/2024    CO2 25 01/31/2024    ANIONGAP 11 01/31/2024     (H) 01/31/2024    BUN 20.3 01/31/2024    CR 0.76 01/31/2024    GFRESTIMATED 89 01/31/2024    SAIDA 9.6 01/31/2024        A1C RESULTS:  Lab Results   Component Value Date    A1C 5.3 01/31/2024         Imaging:  Results of bilateral venous competency studies performed today:  RIGHT LEG:  No clinically significant deep venous pathology.  The right GSV has been ablated throughout the thigh.  The remnant GSV of the proximal to mid calf is incompetent.  No other clinically significant right leg superficial axial venous incompetence.  There is an incompetent  vein (5.7 mm) 4 cm cephalad to the medial knee crease communicating with a 6.5 mm varicosity.    LEFT LEG:  No clinically significant deep venous pathology.  The left greater saphenous vein is ablated " throughout the thigh.  Limited incompetence of the remnant left GSV in the proximal calf and in the distal calf.  Limited incompetence of a small vein of Giacomini which is not clinically significant.  Pelvic source vein measuring up to 3.9 mm coursing down the lateral thigh joining the SSV at the saphenofemoral junction.      Jeff Myers MD    After Visit Follow Up  Per patient request, faxed her compression hose Rx to [facility] at [phone].   Patient would like 1 pair(s) of BLACK, CLOSED-toe, THIGH high, 20-30mmhg compression hose SIGVARIS BRAND. Fax confirmed.    Patient is aware the compression hose will be shipped to her home address.    Rox Hairston CMA    After Visit Follow Up  Spoke with patient regarding process of insurance submission. Informed patient this process could take up to 14 business days, but once approved, you will be contacted to schedule your procedure. (Further documentation of this process will be documented in the referral). Informed patient to call our office regarding the status of their insurance authorization if it has been more than 3 weeks and have not heard from our surgery scheduler.    Handed patient written procedure instructions to review on their own (see After Visit Summary).    Instructed patient if they have any questions for a nurse to give us a call back otherwise a nurse will be calling to review procedure instructions with patient 1-2 weeks prior to their scheduled procedure.    Patient Pre-op Questions:  Preferred Pharmacy: [email or web address]  Anticoagulant/ASA: No  Artificial Joint or Heart Valve:  No  Open ulcer:  No  Sedation nausea: No      Unless Venaseal only procedure, Thigh High Compression Stockings are recommended, medical grade, 20-30 mmHg strength.    Options for purchasing Compression stockings:    You can call your insurance company and ask if compression stockings are covered.  They usually fall under your Durable Medical Equipment (DME)  coverage, if covered. The DME codes if they ask are: Knee high , Thigh high , Panty .   Be sure to ask if you need a specific diagnosis for coverage, if your deductible needs to be met first, how many pairs are covered and how often.  If insurance covers and you would like to order from [facility], or another medical supply company: call the clinic back and we can fax a prescription to your medical supply company of choice. They will bill your insurance and ship them to you. We will ask you color choice (black or beige), and toe choice (open or closed toe).    We sell many sizes in our clinic (except specialty order or petite sizing). We can check to see if we have your size.  Knee high (Mediven brand) are $65/pair  Thigh high (Mediven brand) are $90/pair.   If you would like to purchase from the clinic, let us know, and we will set them aside for you to  and pay for at your next clinic appointment or the day of your procedure.    Online website ordering options:   [email or web address]  [email or web address] has many options available.    Rox Hairston [facility]    Again, thank you for allowing me to participate in the care of your patient.        Sincerely,        Jeff Myers MD    Electronically signed

## 2025-06-30 NOTE — PROGRESS NOTES
After Visit Follow Up  Spoke with patient regarding process of insurance submission. Informed patient this process could take up to 14 business days, but once approved, you will be contacted to schedule your procedure. (Further documentation of this process will be documented in the referral). Informed patient to call our office regarding the status of their insurance authorization if it has been more than 3 weeks and have not heard from our surgery scheduler.    Handed patient written procedure instructions to review on their own (see After Visit Summary).    Instructed patient if they have any questions for a nurse to give us a call back otherwise a nurse will be calling to review procedure instructions with patient 1-2 weeks prior to their scheduled procedure.    Patient Pre-op Questions:  [facility]  Anticoagulant/ASA: No  Artificial Joint or Heart Valve:  No  Open ulcer:  No  Sedation nausea: No      Unless Venaseal only procedure, Thigh High Compression Stockings are recommended, medical grade, 20-30 mmHg strength.    Options for purchasing Compression stockings:    You can call your insurance company and ask if compression stockings are covered.  They usually fall under your Durable Medical Equipment (DME) coverage, if covered. The DME codes if they ask are: Knee high , Thigh high , Panty .   Be sure to ask if you need a specific diagnosis for coverage, if your deductible needs to be met first, how many pairs are covered and how often.  If insurance covers and you would like to order from [facility], or another medical supply company: call the clinic back and we can fax a prescription to your medical supply company of choice. They will bill your insurance and ship them to you. We will ask you color choice (black or beige), and toe choice (open or closed toe).    We sell many sizes in our clinic (except specialty order or petite sizing). We can  check to see if we have your size.  Knee high (Mediven brand) are $65/pair  Thigh high (Mediven brand) are $90/pair.   If you would like to purchase from the clinic, let us know, and we will set them aside for you to  and pay for at your next clinic appointment or the day of your procedure.    Online website ordering options:   [email or web address]  [email or web address] has many options available.    Rox Hairston [facility]

## 2025-06-30 NOTE — PROGRESS NOTES
After Visit Follow Up  Per patient request, faxed her compression hose Rx to [facility] at [phone].   Patient would like 1 pair(s) of BLACK, CLOSED-toe, THIGH high, 20-30mmhg compression hose SIGVARIS BRAND. Fax confirmed.    Patient is aware the compression hose will be shipped to her home address.    Rox Hairston CMA

## 2025-06-30 NOTE — PATIENT INSTRUCTIONS
Pre-Procedure Instructions:RIGHT LEG PERFERATOR                           [facility] Closure and Phlebectomies   You are having a Closure(s) - where one or more veins are closed and Phlebectomies - where one or more veins are removed.   Insurance  Precertification and/or referral authorization may be required by your insurance company.  We will call your insurance company to verify benefits for the medically necessary part of your procedure.    Your Current Medications and Allergies  To reduce bruising, please do not take aspirin-type medications (Motrin, Aleve, Ibuprofen, Advil, etc.) for three days before your procedure. You may take Tylenol if you need a pain reliever.  Are you on blood thinner medications? (Plavix, Coumadin, Eliquis, Xarelto) Please discuss this with your surgeon. You may resume taking your blood thinner medication after your procedure.  Are you sensitive to latex or adhesives used for fake fingernails? Please let us know!    Driving Escort and   Please arrange to have a trusted adult (18 years old or older) drive you to and from the clinic.  For your safety, we recommend you have a trusted adult to stay with you until the next morning.    Your Health  If you have a change in your health before the procedure, contact our office immediately. (For example: cold symptoms, cough, urinary tract infection, fever, flu symptoms.)  A pre-procedure physical is not required.    Note  It is sometimes necessary to adjust the procedure schedule due to emergencies. We greatly appreciate your flexibility and understanding in this matter.                  Check List: The Morning of Your Procedure  ___1. Please do not put anything on your leg(s) or shave the day of your procedure.  ___2. You may take your normal medications the day of your procedure.  ___3. It is recommended you eat a light breakfast or lunch the day of your procedure.  ___4. Wear comfortable loose-fitting clothing and wide-fitting shoes  (i.e. tennis shoes, slip-ons).  ___5. Please arrive at our clinic at the specified time given by the nurse.  ___6. You will sign an affirmation of informed consent.  ___7. Bring your pre-procedure sedation medication (lorazepam and clonidine) with you to the clinic.              One hour before your procedure, you will be instructed to take these medications. The lorazepam              (Ativan) lowers anxiety and sedates you; the clonidine makes the lorazepam more effective. Everyone's              body processes these medications differently. Therefore, reactions to these medications vary. Some              people stay awake and some people sleep through the whole procedure. You may not remember              everything about the procedure or the day. You do not want to make any big decisions for the rest of the              day.    The Day of Your Procedure:       [facility] Closure and Phlebectomies  In the Exam Room  A nurse will bring you back to an exam room with your family member or friend. This is when your informed consent will be signed, and you will take your pre-procedure medications.  You will be asked to remove everything from the waist down, including undergarments. You will then put on a hospital gown or shorts and blue booties.  Your surgeon will come in to answer any questions and hesham any bulging varicose veins to be removed.  You will be taken to the restroom to empty your bladder before going into the procedure room.    In the Procedure Room  You will be escorted to the procedure room. You will lie on a procedure table covered with a sheet or blanket.  A nurse will put a blood pressure cuff on your arm and a pulse/oxygen monitor on a finger. Your vital signs will be monitored every 15 minutes.  Your gown will be pulled up slightly and the groin exposed for a short period of time. The surgeon's assistant will clean your foot, leg, and groin with an antibacterial solution. We will get you covered up as  quickly as possible!  Sterile towels and blue drapes will be used to cover you and the table. You will be asked to keep your hands under the blue drapes during the procedure.  The lights will be turned down. The table will be tipped so your head is higher than your feet. You may feel like you're going to slide off, but you won't.    The Procedure  The surgeon will visualize your veins with an ultrasound machine. He or she will then numb your skin and access the vein. A catheter is passed up the vein and positioned with ultrasound guidance. The table will then be tipped head down.  Once the catheter is in the correct position, medication will be injected to numb your leg. You will feel some needle sticks and may feel discomfort as the medication goes in. Once this is done, you should not experience significant discomfort. But if you do, please let us know and more numbing medication can be injected. As the catheter sends out heat, the vein closes off and the catheter is withdrawn.  For the phlebectomy part of the procedure, small incisions are made where the bulging varicose veins have been marked on your leg(s) and these veins will be removed using a small brooke hook instrument.    Post-Procedure  Once the procedure is done, your leg(s) will be washed with warm water and dried. Your leg(s) will be bandaged with large soft dressings and a large ACE bandage wrapped from toes to groin.   You will be offered something to drink and a light snack.  You will rest with your leg(s) elevated for approximately 30 minutes. Your friend or family member may join you.  For your safety, you will be taken to your car in a wheelchair. If you are able to, it is good to keep your leg(s) elevated on the car ride home.    Post-Procedure Instructions:             [facility] Closure and Phlebectomies      Post-Op Day Zero - The Day of Your Procedure:0  1. Medication for Pain Control and Inflammation Control   - The numbing medication injected  during your procedure will last for several hours. The pre-procedure                 tablets may make you very sleepy and you might not remember everything from the procedure or from                 the day. This will usually wear off by the next day.   - Ibuprofen:  If tolerated, take ibuprofen (e.g., Advil) to reduce inflammation whether or not you have                 pain. For three days, take two tablets (200 mg each) with every meal and at bedtime with a snack. If                 your pain is not controlled with ibuprofen, you may take prescription pain medication (such as Norco),                 if prescribed.   - You may resume taking any medications you were taking before your procedure.  2. Activity   - Rest with your leg(s) elevated above your heart. This will prevent from a lot of swelling and                 bleeding. You do not need to elevate your leg(s) while sleeping at night. You may go upstairs, sit up to                 eat, use the bathroom, and take several five-minute walks. Otherwise, keep your leg(s) elevated.                 Minimize the amount of time you are up on your feet to about 30 minutes at a time.  3. Bandages   - The incision sites will be covered with soft bandages and an ACE wrap. Keep your bandages on                 and dry until your post procedure nurse visit. The ACE should provide  snug  compression but should not cause pain or                 numbness in the toes. If you have significant discomfort or your toes become cold or numb, unwrap                 your ACE and rewrap with less tension starting at the toes wrapping upward.  4. Incisions   - Bleeding: You may see some incision sites that are oozing through the bandages. This is not unusual      and can be managed with Rest, Ice, Compression and Elevation (RICE). Apply ice and firm pressure      directly to the site that is bleeding and rest with your leg(s) elevated above your heart for 20-30                  minutes.    Post-Op Day One:  1. Medication   - Ibuprofen: Continue the same as the Day of Your Procedure. If your pain is not controlled with                 ibuprofen, you may take prescription pain medication (such as Norco), if prescribed.  2. Activity   - We would like you to get up at least six times and walk around for short periods of time, unless it is      causing you pain. You should not be on your feet more than 90 minutes at a time. Elevate your leg      above your heart when you are not walking.  3. Bandages   - Your bandages must be kept on and dry until your post procedure nurse visit..  4. Driving   - You may resume driving when you can do so safely. Do not drive if you are taking narcotic pain      medication.  Post-Op Day Two:   1. Medication  - Ibuprofen: Continue the same as the Day of Your Procedure.  2.  Activity  - Walk as tolerated. Elevate as much as possible when not walking.  3. Bandages and Compression  - Remove ACE wrap and padding. Shower and put on your compression hose during waking hours only       for at least 5 days. (Your doctor may instruct you to keep your bandages on until your return     appointment; please follow your doctor's instructions.)  4. Incisions  - Your leg(s) will be bruised; there may be swelling, hard knots under the skin and possibly some     numbness. These will likely resolve over time. If you see  hair-like  strings coming out of your     incisions, do not pull them (this will only cause pain/discomfort). We will trim them when you come     back for your follow-up appointment.  5. Call Us If:   - You see any areas on your leg that are red and angry in appearance.   - You notice any drainage that is milky or cloudy in appearance or that has a foul odor.   - You run a temperature of 100.5 or greater.    Post-Op Day Three:  You will have a follow up appointment 2-4 days post-procedure. At this appointment, you will have an ultrasound and we will check your  incisions.    ________________________________________________________________________________________    The Two Weeks Following Your Procedure  1.  Skin Care   - Do not use any lotions, creams or powders on your incisions for 14 days or until the incisions have                 healed.   - Do not soak in a bathtub, hot tub or go swimming for 14 days or until your incisions have healed.  2.  Medications   - You may use ibuprofen or acetaminophen (e.g., Tylenol) as needed for pain or discomfort.  3.  Activity   - Do not lift over 25 pounds. After about two weeks you may resume exercise such as aerobics,                 running, tennis or weightlifting. Use your common sense and ease back into your exercise routine                 slowly.   - You may feel a cord-like tightness along the inside of your leg. Gentle stretching can be helpful.  4. Compression Hose   - Your doctor may instruct you to wear compression for longer than seven days; please follow your                 doctor's instructions. As a comfort measure, you may choose to wear compression for longer than                 required.  5.  Travel   - Do not fly in an airplane for 14 days after your procedure. If you have a long car trip planned within                 two to three weeks following your procedure, stop and walk for a few minutes every two hours.      Periodic ankle pumps during the ride may be helpful.    Six Week Appointment  - At your six-week appointment, you will see your surgeon for an exam and evaluation. This office visit     will be scheduled when you return for post-op day three return appointment.       Return to Work  1.  If you work outside the home, you may return to work in a few days depending on the extent of your        procedure, how you tolerate it, and the type of work you perform.  2.  Paperwork: If your employer requires paperwork or you would like a letter written to your employer, please        let us know. We will  complete disability type forms at no charge. Please allow five business days for forms        to be completed.

## 2025-07-12 ENCOUNTER — HEALTH MAINTENANCE LETTER (OUTPATIENT)
Age: 60
End: 2025-07-12

## 2025-08-13 DIAGNOSIS — I83.811 VARICOSE VEINS OF LEG WITH PAIN, RIGHT: Primary | ICD-10-CM

## (undated) DEVICE — SUCTION MANIFOLD DORNOCH ULTRA CART UL-CL500

## (undated) DEVICE — Device

## (undated) DEVICE — STRAP UNIVERSAL POSITIONING 2-PIECE 4X47X76" 91-287

## (undated) DEVICE — SPONGE COTTONOID 1/2X1/2" 80-1400

## (undated) DEVICE — ESU SUCTION CAUTERY 10FR FOOT CONTROL E2505-10FR

## (undated) DEVICE — ANTIFOG SOLUTION W/FOAM PAD CF-1001

## (undated) DEVICE — ESU PENCIL W/COATED BLADE E2450H

## (undated) DEVICE — SOL NACL 0.9% IRRIG 1000ML BOTTLE 2F7124

## (undated) DEVICE — TUBING SUCTION MEDI-VAC SOFT 3/16"X20' N520A

## (undated) DEVICE — BLADE KNIFE SURG 15 371115

## (undated) DEVICE — ESU ELEC BLADE 2.75" COATED/INSULATED E1455

## (undated) DEVICE — LINEN TOWEL PACK X5 5464

## (undated) DEVICE — ESU GROUND PAD ADULT W/CORD E7507

## (undated) DEVICE — SOL WATER IRRIG 1000ML BOTTLE 2F7114

## (undated) DEVICE — SU VICRYL 3-0 SH 8X18" UND J864D

## (undated) DEVICE — ESU COBLATOR REFLEX ULTRA 45DEG W/CABLE EICA4845-01

## (undated) DEVICE — SPONGE COTTONOID 1/2X3" 80-1407

## (undated) DEVICE — SYR 10ML FINGER CONTROL W/O NDL 309695

## (undated) DEVICE — NDL 25GA 2"  8881200441

## (undated) DEVICE — ESU ELEC NDL 1" COATED/INSULATED E1465

## (undated) DEVICE — ESU PENCIL W/SMOKE EVAC ROCKER E2350HS

## (undated) DEVICE — GLOVE PROTEXIS POWDER FREE SMT 6.5  2D72PT65X

## (undated) RX ORDER — HYDROMORPHONE HYDROCHLORIDE 1 MG/ML
INJECTION, SOLUTION INTRAMUSCULAR; INTRAVENOUS; SUBCUTANEOUS
Status: DISPENSED
Start: 2019-11-13

## (undated) RX ORDER — PROPOFOL 10 MG/ML
INJECTION, EMULSION INTRAVENOUS
Status: DISPENSED
Start: 2019-11-13

## (undated) RX ORDER — ONDANSETRON 2 MG/ML
INJECTION INTRAMUSCULAR; INTRAVENOUS
Status: DISPENSED
Start: 2019-11-13

## (undated) RX ORDER — DEXAMETHASONE SODIUM PHOSPHATE 4 MG/ML
INJECTION, SOLUTION INTRA-ARTICULAR; INTRALESIONAL; INTRAMUSCULAR; INTRAVENOUS; SOFT TISSUE
Status: DISPENSED
Start: 2019-11-13

## (undated) RX ORDER — LIDOCAINE HYDROCHLORIDE AND EPINEPHRINE 10; 10 MG/ML; UG/ML
INJECTION, SOLUTION INFILTRATION; PERINEURAL
Status: DISPENSED
Start: 2019-11-13

## (undated) RX ORDER — CHLORHEXIDINE GLUCONATE ORAL RINSE 1.2 MG/ML
SOLUTION DENTAL
Status: DISPENSED
Start: 2019-11-13

## (undated) RX ORDER — OXYMETAZOLINE HYDROCHLORIDE 0.05 G/100ML
SPRAY NASAL
Status: DISPENSED
Start: 2019-11-13

## (undated) RX ORDER — LIDOCAINE HYDROCHLORIDE 20 MG/ML
SOLUTION OROPHARYNGEAL
Status: DISPENSED
Start: 2021-04-15

## (undated) RX ORDER — LIDOCAINE HYDROCHLORIDE 20 MG/ML
INJECTION, SOLUTION EPIDURAL; INFILTRATION; INTRACAUDAL; PERINEURAL
Status: DISPENSED
Start: 2019-11-13

## (undated) RX ORDER — FENTANYL CITRATE 50 UG/ML
INJECTION, SOLUTION INTRAMUSCULAR; INTRAVENOUS
Status: DISPENSED
Start: 2019-11-13